# Patient Record
Sex: FEMALE | Race: WHITE | NOT HISPANIC OR LATINO
[De-identification: names, ages, dates, MRNs, and addresses within clinical notes are randomized per-mention and may not be internally consistent; named-entity substitution may affect disease eponyms.]

---

## 2017-01-10 ENCOUNTER — APPOINTMENT (OUTPATIENT)
Dept: DERMATOLOGY | Facility: CLINIC | Age: 57
End: 2017-01-10

## 2017-01-10 DIAGNOSIS — Q90.9 DOWN SYNDROME, UNSPECIFIED: ICD-10-CM

## 2017-01-10 DIAGNOSIS — L71.9 ROSACEA, UNSPECIFIED: ICD-10-CM

## 2017-01-11 ENCOUNTER — OUTPATIENT (OUTPATIENT)
Dept: OUTPATIENT SERVICES | Facility: HOSPITAL | Age: 57
LOS: 1 days | Discharge: HOME | End: 2017-01-11

## 2017-01-18 ENCOUNTER — FORM ENCOUNTER (OUTPATIENT)
Age: 57
End: 2017-01-18

## 2017-02-15 ENCOUNTER — APPOINTMENT (OUTPATIENT)
Dept: OBGYN | Facility: HOSPITAL | Age: 57
End: 2017-02-15

## 2017-02-15 VITALS
WEIGHT: 158 LBS | SYSTOLIC BLOOD PRESSURE: 116 MMHG | HEIGHT: 55 IN | HEART RATE: 64 BPM | TEMPERATURE: 97.7 F | BODY MASS INDEX: 36.57 KG/M2 | DIASTOLIC BLOOD PRESSURE: 70 MMHG

## 2017-03-01 ENCOUNTER — APPOINTMENT (OUTPATIENT)
Age: 57
End: 2017-03-01

## 2017-03-01 VITALS
WEIGHT: 162 LBS | HEIGHT: 55 IN | BODY MASS INDEX: 37.49 KG/M2 | HEART RATE: 67 BPM | TEMPERATURE: 97.9 F | DIASTOLIC BLOOD PRESSURE: 70 MMHG | SYSTOLIC BLOOD PRESSURE: 126 MMHG

## 2017-03-01 DIAGNOSIS — Z86.39 PERSONAL HISTORY OF OTHER ENDOCRINE, NUTRITIONAL AND METABOLIC DISEASE: ICD-10-CM

## 2017-03-06 ENCOUNTER — APPOINTMENT (OUTPATIENT)
Dept: UROLOGY | Facility: CLINIC | Age: 57
End: 2017-03-06

## 2017-03-29 ENCOUNTER — APPOINTMENT (OUTPATIENT)
Age: 57
End: 2017-03-29

## 2017-03-29 VITALS — WEIGHT: 165 LBS | HEIGHT: 55 IN | BODY MASS INDEX: 38.18 KG/M2

## 2017-04-11 ENCOUNTER — APPOINTMENT (OUTPATIENT)
Dept: PODIATRY | Facility: HOSPITAL | Age: 57
End: 2017-04-11

## 2017-05-04 ENCOUNTER — RX RENEWAL (OUTPATIENT)
Age: 57
End: 2017-05-04

## 2017-05-04 DIAGNOSIS — F03.90 UNSPECIFIED DEMENTIA W/OUT BEHAVIORAL DISTURBANCE: ICD-10-CM

## 2017-05-22 ENCOUNTER — APPOINTMENT (OUTPATIENT)
Age: 57
End: 2017-05-22

## 2017-05-22 ENCOUNTER — OUTPATIENT (OUTPATIENT)
Dept: OUTPATIENT SERVICES | Facility: HOSPITAL | Age: 57
LOS: 1 days | Discharge: HOME | End: 2017-05-22

## 2017-05-22 VITALS
BODY MASS INDEX: 36.1 KG/M2 | WEIGHT: 156 LBS | DIASTOLIC BLOOD PRESSURE: 72 MMHG | SYSTOLIC BLOOD PRESSURE: 124 MMHG | HEIGHT: 55 IN | HEART RATE: 68 BPM | TEMPERATURE: 97.8 F

## 2017-05-22 DIAGNOSIS — R21 RASH AND OTHER NONSPECIFIC SKIN ERUPTION: ICD-10-CM

## 2017-06-01 LAB
ALBUMIN SERPL-MCNC: 3.6 G/DL
ALBUMIN/GLOB SERPL: 0.97
ALP SERPL-CCNC: 38 IU/L
ALT SERPL-CCNC: 35 IU/L
ANION GAP SERPL CALC-SCNC: 7 MMOL/L
AST SERPL-CCNC: 39 IU/L
BASOPHILS # BLD: 0.04 TH/MM3
BASOPHILS NFR BLD: 0.8 %
BILIRUB SERPL-MCNC: 0.5 MG/DL
BUN SERPL-MCNC: 22 MG/DL
BUN/CREAT SERPL: 20.8 %
CALCIUM SERPL-MCNC: 8.8 MG/DL
CHLORIDE SERPL-SCNC: 103 MMOL/L
CHOLEST SERPL-MCNC: 196 MG/DL
CO2 SERPL-SCNC: 28 MMOL/L
CREAT SERPL-MCNC: 1.06 MG/DL
EOSINOPHIL # BLD: 0.1 TH/MM3
EOSINOPHIL NFR BLD: 2 %
ERYTHROCYTE [DISTWIDTH] IN BLOOD BY AUTOMATED COUNT: 14.7 %
GFR SERPL CREATININE-BSD FRML MDRD: 54
GLUCOSE SERPL-MCNC: 77 MG/DL
GRANULOCYTES # BLD: 2.72 TH/MM3
GRANULOCYTES NFR BLD: 55.2 %
HCT VFR BLD AUTO: 44.4 %
HDLC SERPL-MCNC: 46 MG/DL
HDLC SERPL: 4.3
HGB BLD-MCNC: 14.4 G/DL
IMM GRANULOCYTES # BLD: 0 TH/MM3
IMM GRANULOCYTES NFR BLD: 0 %
LDLC SERPL DIRECT ASSAY-MCNC: 124 MG/DL
LYMPHOCYTES # BLD: 1.5 TH/MM3
LYMPHOCYTES NFR BLD: 30.4 %
MCH RBC QN AUTO: 32.2 PG
MCHC RBC AUTO-ENTMCNC: 32.4 G/DL
MCV RBC AUTO: 99.3 FL
MONOCYTES # BLD: 0.57 TH/MM3
MONOCYTES NFR BLD: 11.6 %
PLATELET # BLD: 247 TH/MM3
PMV BLD AUTO: 11.5 FL
POTASSIUM SERPL-SCNC: 4.1 MMOL/L
PROT SERPL-MCNC: 7.3 G/DL
RBC # BLD AUTO: 4.47 ML/MM3
SODIUM SERPL-SCNC: 138 MMOL/L
TRIGL SERPL-MCNC: 54 MG/DL
VLDLC SERPL-MCNC: 10 MG/DL
WBC # BLD: 4.93 TH/MM3

## 2017-06-02 LAB — THYROID STIM HORM-HS 3RD GEN (SOUTH): 2 UIU/ML

## 2017-06-05 DIAGNOSIS — E55.9 VITAMIN D DEFICIENCY, UNSPECIFIED: ICD-10-CM

## 2017-06-05 LAB
25(OH)D3 SERPL-MCNC: 43 NG/ML
VITAMIN D2 SERPL-MCNC: <4 NG/ML
VITAMIN D3 SERPL-MCNC: 43 NG/ML

## 2017-06-27 ENCOUNTER — APPOINTMENT (OUTPATIENT)
Dept: PODIATRY | Facility: HOSPITAL | Age: 57
End: 2017-06-27

## 2017-06-27 ENCOUNTER — OUTPATIENT (OUTPATIENT)
Dept: OUTPATIENT SERVICES | Facility: HOSPITAL | Age: 57
LOS: 1 days | Discharge: HOME | End: 2017-06-27

## 2017-06-27 DIAGNOSIS — J69.0 PNEUMONITIS DUE TO INHALATION OF FOOD AND VOMIT: ICD-10-CM

## 2017-06-27 DIAGNOSIS — L85.1 ACQUIRED KERATOSIS [KERATODERMA] PALMARIS ET PLANTARIS: ICD-10-CM

## 2017-06-28 DIAGNOSIS — N39.41 URGE INCONTINENCE: ICD-10-CM

## 2017-06-28 DIAGNOSIS — F79 UNSPECIFIED INTELLECTUAL DISABILITIES: ICD-10-CM

## 2017-06-28 DIAGNOSIS — E66.9 OBESITY, UNSPECIFIED: ICD-10-CM

## 2017-06-28 DIAGNOSIS — R21 RASH AND OTHER NONSPECIFIC SKIN ERUPTION: ICD-10-CM

## 2017-06-28 DIAGNOSIS — E55.9 VITAMIN D DEFICIENCY, UNSPECIFIED: ICD-10-CM

## 2017-06-28 DIAGNOSIS — L85.1 ACQUIRED KERATOSIS [KERATODERMA] PALMARIS ET PLANTARIS: ICD-10-CM

## 2017-06-28 DIAGNOSIS — F03.90 UNSPECIFIED DEMENTIA, UNSPECIFIED SEVERITY, WITHOUT BEHAVIORAL DISTURBANCE, PSYCHOTIC DISTURBANCE, MOOD DISTURBANCE, AND ANXIETY: ICD-10-CM

## 2017-06-28 DIAGNOSIS — B35.1 TINEA UNGUIUM: ICD-10-CM

## 2017-06-28 DIAGNOSIS — L85.3 XEROSIS CUTIS: ICD-10-CM

## 2017-06-28 DIAGNOSIS — E03.9 HYPOTHYROIDISM, UNSPECIFIED: ICD-10-CM

## 2017-06-28 DIAGNOSIS — M85.80 OTHER SPECIFIED DISORDERS OF BONE DENSITY AND STRUCTURE, UNSPECIFIED SITE: ICD-10-CM

## 2017-07-03 ENCOUNTER — APPOINTMENT (OUTPATIENT)
Dept: UROLOGY | Facility: CLINIC | Age: 57
End: 2017-07-03

## 2017-07-03 VITALS
SYSTOLIC BLOOD PRESSURE: 113 MMHG | HEART RATE: 60 BPM | WEIGHT: 156 LBS | HEIGHT: 55 IN | BODY MASS INDEX: 36.1 KG/M2 | DIASTOLIC BLOOD PRESSURE: 65 MMHG

## 2017-07-19 ENCOUNTER — APPOINTMENT (OUTPATIENT)
Age: 57
End: 2017-07-19

## 2017-07-19 VITALS — WEIGHT: 153 LBS | BODY MASS INDEX: 35.41 KG/M2 | HEIGHT: 55 IN

## 2017-08-02 ENCOUNTER — RX RENEWAL (OUTPATIENT)
Age: 57
End: 2017-08-02

## 2017-08-14 ENCOUNTER — APPOINTMENT (OUTPATIENT)
Age: 57
End: 2017-08-14

## 2017-08-14 ENCOUNTER — OUTPATIENT (OUTPATIENT)
Dept: OUTPATIENT SERVICES | Facility: HOSPITAL | Age: 57
LOS: 1 days | Discharge: HOME | End: 2017-08-14

## 2017-08-14 VITALS
SYSTOLIC BLOOD PRESSURE: 108 MMHG | HEIGHT: 55 IN | BODY MASS INDEX: 34.48 KG/M2 | DIASTOLIC BLOOD PRESSURE: 74 MMHG | WEIGHT: 149 LBS | HEART RATE: 68 BPM | TEMPERATURE: 97.6 F

## 2017-08-14 DIAGNOSIS — M85.80 OTHER SPECIFIED DISORDERS OF BONE DENSITY AND STRUCTURE, UNSPECIFIED SITE: ICD-10-CM

## 2017-08-14 DIAGNOSIS — F03.90 UNSPECIFIED DEMENTIA, UNSPECIFIED SEVERITY, WITHOUT BEHAVIORAL DISTURBANCE, PSYCHOTIC DISTURBANCE, MOOD DISTURBANCE, AND ANXIETY: ICD-10-CM

## 2017-08-14 DIAGNOSIS — N39.41 URGE INCONTINENCE: ICD-10-CM

## 2017-08-14 DIAGNOSIS — E03.9 HYPOTHYROIDISM, UNSPECIFIED: ICD-10-CM

## 2017-08-14 DIAGNOSIS — E66.9 OBESITY, UNSPECIFIED: ICD-10-CM

## 2017-08-14 DIAGNOSIS — R26.81 UNSTEADINESS ON FEET: ICD-10-CM

## 2017-08-14 DIAGNOSIS — N32.81 OVERACTIVE BLADDER: ICD-10-CM

## 2017-08-14 DIAGNOSIS — J69.0 PNEUMONITIS DUE TO INHALATION OF FOOD AND VOMIT: ICD-10-CM

## 2017-09-05 ENCOUNTER — EMERGENCY (EMERGENCY)
Facility: HOSPITAL | Age: 57
LOS: 0 days | Discharge: HOME | End: 2017-09-05
Admitting: INTERNAL MEDICINE

## 2017-09-05 ENCOUNTER — APPOINTMENT (OUTPATIENT)
Dept: PODIATRY | Facility: HOSPITAL | Age: 57
End: 2017-09-05

## 2017-09-05 ENCOUNTER — OUTPATIENT (OUTPATIENT)
Dept: OUTPATIENT SERVICES | Facility: HOSPITAL | Age: 57
LOS: 1 days | Discharge: HOME | End: 2017-09-05

## 2017-09-05 DIAGNOSIS — Z79.899 OTHER LONG TERM (CURRENT) DRUG THERAPY: ICD-10-CM

## 2017-09-05 DIAGNOSIS — J69.0 PNEUMONITIS DUE TO INHALATION OF FOOD AND VOMIT: ICD-10-CM

## 2017-09-05 DIAGNOSIS — I10 ESSENTIAL (PRIMARY) HYPERTENSION: ICD-10-CM

## 2017-09-05 DIAGNOSIS — E03.9 HYPOTHYROIDISM, UNSPECIFIED: ICD-10-CM

## 2017-09-05 DIAGNOSIS — F79 UNSPECIFIED INTELLECTUAL DISABILITIES: ICD-10-CM

## 2017-09-05 DIAGNOSIS — M13.831 OTHER SPECIFIED ARTHRITIS, RIGHT WRIST: ICD-10-CM

## 2017-09-05 DIAGNOSIS — M25.431 EFFUSION, RIGHT WRIST: ICD-10-CM

## 2017-09-05 DIAGNOSIS — B35.1 TINEA UNGUIUM: ICD-10-CM

## 2017-09-13 ENCOUNTER — APPOINTMENT (OUTPATIENT)
Age: 57
End: 2017-09-13

## 2017-09-13 ENCOUNTER — OUTPATIENT (OUTPATIENT)
Dept: OUTPATIENT SERVICES | Facility: HOSPITAL | Age: 57
LOS: 1 days | Discharge: HOME | End: 2017-09-13

## 2017-09-13 VITALS — HEIGHT: 55 IN | WEIGHT: 148 LBS | HEART RATE: 60 BPM | TEMPERATURE: 97.4 F | BODY MASS INDEX: 34.25 KG/M2

## 2017-09-13 DIAGNOSIS — E03.9 HYPOTHYROIDISM, UNSPECIFIED: ICD-10-CM

## 2017-09-13 DIAGNOSIS — E66.9 OBESITY, UNSPECIFIED: ICD-10-CM

## 2017-09-13 DIAGNOSIS — Z23 ENCOUNTER FOR IMMUNIZATION: ICD-10-CM

## 2017-09-13 DIAGNOSIS — M25.431 EFFUSION, RIGHT WRIST: ICD-10-CM

## 2017-09-13 DIAGNOSIS — J69.0 PNEUMONITIS DUE TO INHALATION OF FOOD AND VOMIT: ICD-10-CM

## 2017-09-13 DIAGNOSIS — R26.81 UNSTEADINESS ON FEET: ICD-10-CM

## 2017-09-13 DIAGNOSIS — B35.6 TINEA CRURIS: ICD-10-CM

## 2017-09-13 DIAGNOSIS — F03.90 UNSPECIFIED DEMENTIA, UNSPECIFIED SEVERITY, WITHOUT BEHAVIORAL DISTURBANCE, PSYCHOTIC DISTURBANCE, MOOD DISTURBANCE, AND ANXIETY: ICD-10-CM

## 2017-09-13 DIAGNOSIS — N39.41 URGE INCONTINENCE: ICD-10-CM

## 2017-11-09 ENCOUNTER — APPOINTMENT (OUTPATIENT)
Dept: PODIATRY | Facility: HOSPITAL | Age: 57
End: 2017-11-09

## 2017-11-09 ENCOUNTER — RX RENEWAL (OUTPATIENT)
Age: 57
End: 2017-11-09

## 2017-11-13 ENCOUNTER — APPOINTMENT (OUTPATIENT)
Age: 57
End: 2017-11-13

## 2017-11-15 ENCOUNTER — APPOINTMENT (OUTPATIENT)
Age: 57
End: 2017-11-15

## 2017-11-22 ENCOUNTER — APPOINTMENT (OUTPATIENT)
Age: 57
End: 2017-11-22

## 2017-11-22 VITALS — WEIGHT: 147.5 LBS | BODY MASS INDEX: 36.92 KG/M2

## 2017-11-28 ENCOUNTER — OUTPATIENT (OUTPATIENT)
Dept: OUTPATIENT SERVICES | Facility: HOSPITAL | Age: 57
LOS: 1 days | Discharge: HOME | End: 2017-11-28

## 2017-11-28 ENCOUNTER — RX RENEWAL (OUTPATIENT)
Age: 57
End: 2017-11-28

## 2017-11-28 ENCOUNTER — APPOINTMENT (OUTPATIENT)
Dept: PODIATRY | Facility: HOSPITAL | Age: 57
End: 2017-11-28

## 2017-11-28 DIAGNOSIS — F79 UNSPECIFIED INTELLECTUAL DISABILITIES: ICD-10-CM

## 2017-11-28 DIAGNOSIS — L85.3 XEROSIS CUTIS: ICD-10-CM

## 2017-11-28 DIAGNOSIS — B35.1 TINEA UNGUIUM: ICD-10-CM

## 2017-11-28 DIAGNOSIS — J69.0 PNEUMONITIS DUE TO INHALATION OF FOOD AND VOMIT: ICD-10-CM

## 2017-12-11 ENCOUNTER — APPOINTMENT (OUTPATIENT)
Age: 57
End: 2017-12-11

## 2017-12-11 ENCOUNTER — OUTPATIENT (OUTPATIENT)
Dept: OUTPATIENT SERVICES | Facility: HOSPITAL | Age: 57
LOS: 1 days | Discharge: HOME | End: 2017-12-11

## 2017-12-11 ENCOUNTER — RESULT REVIEW (OUTPATIENT)
Age: 57
End: 2017-12-11

## 2017-12-11 VITALS
BODY MASS INDEX: 33.79 KG/M2 | WEIGHT: 146 LBS | DIASTOLIC BLOOD PRESSURE: 72 MMHG | SYSTOLIC BLOOD PRESSURE: 98 MMHG | TEMPERATURE: 96.3 F | HEART RATE: 64 BPM | HEIGHT: 55 IN

## 2017-12-11 DIAGNOSIS — M85.80 OTHER SPECIFIED DISORDERS OF BONE DENSITY AND STRUCTURE, UNSPECIFIED SITE: ICD-10-CM

## 2017-12-11 DIAGNOSIS — N32.81 OVERACTIVE BLADDER: ICD-10-CM

## 2017-12-11 DIAGNOSIS — E03.9 HYPOTHYROIDISM, UNSPECIFIED: ICD-10-CM

## 2017-12-11 DIAGNOSIS — J69.0 PNEUMONITIS DUE TO INHALATION OF FOOD AND VOMIT: ICD-10-CM

## 2017-12-11 DIAGNOSIS — E66.9 OBESITY, UNSPECIFIED: ICD-10-CM

## 2017-12-11 DIAGNOSIS — Z00.00 ENCOUNTER FOR GENERAL ADULT MEDICAL EXAMINATION W/OUT ABNORMAL FINDINGS: ICD-10-CM

## 2017-12-11 DIAGNOSIS — F03.90 UNSPECIFIED DEMENTIA W/OUT BEHAVIORAL DISTURBANCE: ICD-10-CM

## 2017-12-11 DIAGNOSIS — R13.11 DYSPHAGIA, ORAL PHASE: ICD-10-CM

## 2017-12-11 DIAGNOSIS — N39.41 URGE INCONTINENCE: ICD-10-CM

## 2017-12-11 RX ORDER — CHOLECALCIFEROL (VITAMIN D3) 50 MCG
50 MCG TABLET ORAL
Qty: 30 | Refills: 2 | Status: ACTIVE | COMMUNITY
Start: 2017-08-02 | End: 1900-01-01

## 2017-12-11 RX ORDER — B-COMPLEX WITH VITAMIN C
500-200 TABLET ORAL
Qty: 60 | Refills: 2 | Status: ACTIVE | COMMUNITY
Start: 2017-08-02 | End: 1900-01-01

## 2017-12-11 RX ORDER — METRONIDAZOLE 7.5 MG/G
0.75 CREAM TOPICAL
Refills: 0 | Status: ACTIVE | COMMUNITY

## 2017-12-11 RX ORDER — CLOTRIMAZOLE AND BETAMETHASONE DIPROPIONATE 10; .5 MG/G; MG/G
1-0.05 CREAM TOPICAL
Qty: 1 | Refills: 0 | Status: DISCONTINUED | COMMUNITY
Start: 2017-09-13 | End: 2017-12-11

## 2017-12-12 ENCOUNTER — RESULT REVIEW (OUTPATIENT)
Age: 57
End: 2017-12-12

## 2017-12-12 DIAGNOSIS — E66.9 OBESITY, UNSPECIFIED: ICD-10-CM

## 2017-12-12 DIAGNOSIS — F03.90 UNSPECIFIED DEMENTIA WITHOUT BEHAVIORAL DISTURBANCE: ICD-10-CM

## 2017-12-12 DIAGNOSIS — R13.11 DYSPHAGIA, ORAL PHASE: ICD-10-CM

## 2017-12-12 DIAGNOSIS — M85.80 OTHER SPECIFIED DISORDERS OF BONE DENSITY AND STRUCTURE, UNSPECIFIED SITE: ICD-10-CM

## 2017-12-12 DIAGNOSIS — N39.41 URGE INCONTINENCE: ICD-10-CM

## 2017-12-12 DIAGNOSIS — E03.9 HYPOTHYROIDISM, UNSPECIFIED: ICD-10-CM

## 2017-12-12 DIAGNOSIS — N32.81 OVERACTIVE BLADDER: ICD-10-CM

## 2017-12-12 LAB
ALBUMIN SERPL-MCNC: 3.4 G/DL
ALBUMIN/GLOB SERPL: 1
ALP SERPL-CCNC: 42 IU/L
ALT SERPL-CCNC: 16 IU/L
ANION GAP SERPL CALC-SCNC: 9 MMOL/L
AST SERPL-CCNC: 28 IU/L
BASOPHILS # BLD: 0.04 TH/MM3
BASOPHILS NFR BLD: 0.8 %
BILIRUB SERPL-MCNC: 0.6 MG/DL
BUN SERPL-MCNC: 21 MG/DL
BUN/CREAT SERPL: 21 %
CALCIUM SERPL-MCNC: 8.2 MG/DL
CHLORIDE SERPL-SCNC: 107 MMOL/L
CHOLEST SERPL-MCNC: 176 MG/DL
CO2 SERPL-SCNC: 23 MMOL/L
CREAT SERPL-MCNC: 1 MG/DL
EOSINOPHIL # BLD: 0.11 TH/MM3
EOSINOPHIL NFR BLD: 2.2 %
ERYTHROCYTE [DISTWIDTH] IN BLOOD BY AUTOMATED COUNT: 14.1 %
GFR SERPL CREATININE-BSD FRML MDRD: 57
GLUCOSE SERPL-MCNC: 71 MG/DL
GRANULOCYTES # BLD: 3.16 TH/MM3
GRANULOCYTES NFR BLD: 62.5 %
HCT VFR BLD AUTO: 42.6 %
HDLC SERPL-MCNC: 43 MG/DL
HDLC SERPL: 4.1
HGB BLD-MCNC: 13.9 G/DL
IMM GRANULOCYTES # BLD: 0.01 TH/MM3
IMM GRANULOCYTES NFR BLD: 0.2 %
LDLC SERPL DIRECT ASSAY-MCNC: 124 MG/DL
LYMPHOCYTES # BLD: 1.16 TH/MM3
LYMPHOCYTES NFR BLD: 23 %
MCH RBC QN AUTO: 33.1 PG
MCHC RBC AUTO-ENTMCNC: 32.6 G/DL
MCV RBC AUTO: 101.4 FL
MONOCYTES # BLD: 0.57 TH/MM3
MONOCYTES NFR BLD: 11.3 %
PLATELET # BLD: 243 TH/MM3
PMV BLD AUTO: 11.1 FL
POTASSIUM SERPL-SCNC: 4.1 MMOL/L
PROT SERPL-MCNC: 6.8 G/DL
RBC # BLD AUTO: 4.2 ML/MM3
SODIUM SERPL-SCNC: 139 MMOL/L
TRIGL SERPL-MCNC: 72 MG/DL
VLDLC SERPL-MCNC: 14 MG/DL
WBC # BLD: 5.05 TH/MM3

## 2017-12-13 LAB
T4 FREE SERPL-MCNC: 1.4 NG/DL
THYROID STIM HORM-HS 3RD GEN (SOUTH): 2.41 UIU/ML

## 2017-12-26 ENCOUNTER — INPATIENT (INPATIENT)
Facility: HOSPITAL | Age: 57
LOS: 23 days | Discharge: HOME | End: 2018-01-19
Attending: INTERNAL MEDICINE

## 2018-01-25 ENCOUNTER — APPOINTMENT (OUTPATIENT)
Age: 58
End: 2018-01-25

## 2018-01-25 ENCOUNTER — OUTPATIENT (OUTPATIENT)
Dept: OUTPATIENT SERVICES | Facility: HOSPITAL | Age: 58
LOS: 1 days | Discharge: HOME | End: 2018-01-25

## 2018-01-25 VITALS
DIASTOLIC BLOOD PRESSURE: 80 MMHG | BODY MASS INDEX: 33.04 KG/M2 | TEMPERATURE: 98.1 F | WEIGHT: 132 LBS | HEART RATE: 72 BPM | SYSTOLIC BLOOD PRESSURE: 132 MMHG

## 2018-01-25 DIAGNOSIS — Q90.9 DOWN SYNDROME, UNSPECIFIED: ICD-10-CM

## 2018-01-25 DIAGNOSIS — J98.11 ATELECTASIS: ICD-10-CM

## 2018-01-25 DIAGNOSIS — E87.6 HYPOKALEMIA: ICD-10-CM

## 2018-01-25 DIAGNOSIS — R65.21 SEVERE SEPSIS WITH SEPTIC SHOCK: ICD-10-CM

## 2018-01-25 DIAGNOSIS — J96.01 ACUTE RESPIRATORY FAILURE WITH HYPOXIA: ICD-10-CM

## 2018-01-25 DIAGNOSIS — J18.9 PNEUMONIA, UNSPECIFIED ORGANISM: ICD-10-CM

## 2018-01-25 DIAGNOSIS — D69.6 THROMBOCYTOPENIA, UNSPECIFIED: ICD-10-CM

## 2018-01-25 DIAGNOSIS — F03.90 UNSPECIFIED DEMENTIA WITHOUT BEHAVIORAL DISTURBANCE: ICD-10-CM

## 2018-01-25 DIAGNOSIS — E83.42 HYPOMAGNESEMIA: ICD-10-CM

## 2018-01-25 DIAGNOSIS — F79 UNSPECIFIED INTELLECTUAL DISABILITIES: ICD-10-CM

## 2018-01-25 DIAGNOSIS — F80.89 OTHER DEVELOPMENTAL DISORDERS OF SPEECH AND LANGUAGE: ICD-10-CM

## 2018-01-25 DIAGNOSIS — E87.1 HYPO-OSMOLALITY AND HYPONATREMIA: ICD-10-CM

## 2018-01-25 DIAGNOSIS — T38.895A ADVERSE EFFECT OF OTHER HORMONES AND SYNTHETIC SUBSTITUTES, INITIAL ENCOUNTER: ICD-10-CM

## 2018-01-25 DIAGNOSIS — K64.9 UNSPECIFIED HEMORRHOIDS: ICD-10-CM

## 2018-01-25 DIAGNOSIS — J69.0 PNEUMONITIS DUE TO INHALATION OF FOOD AND VOMIT: ICD-10-CM

## 2018-01-25 DIAGNOSIS — A41.9 SEPSIS, UNSPECIFIED ORGANISM: ICD-10-CM

## 2018-01-25 DIAGNOSIS — K56.7 ILEUS, UNSPECIFIED: ICD-10-CM

## 2018-01-25 DIAGNOSIS — E03.9 HYPOTHYROIDISM, UNSPECIFIED: ICD-10-CM

## 2018-01-25 DIAGNOSIS — T40.4X5A ADVERSE EFFECT OF OTHER SYNTHETIC NARCOTICS, INITIAL ENCOUNTER: ICD-10-CM

## 2018-01-25 DIAGNOSIS — D72.828 OTHER ELEVATED WHITE BLOOD CELL COUNT: ICD-10-CM

## 2018-01-25 DIAGNOSIS — I95.9 HYPOTENSION, UNSPECIFIED: ICD-10-CM

## 2018-01-25 DIAGNOSIS — R35.8 OTHER POLYURIA: ICD-10-CM

## 2018-01-25 DIAGNOSIS — E27.40 UNSPECIFIED ADRENOCORTICAL INSUFFICIENCY: ICD-10-CM

## 2018-01-25 DIAGNOSIS — R13.10 DYSPHAGIA, UNSPECIFIED: ICD-10-CM

## 2018-01-25 DIAGNOSIS — D64.9 ANEMIA, UNSPECIFIED: ICD-10-CM

## 2018-01-25 DIAGNOSIS — I95.89 OTHER HYPOTENSION: ICD-10-CM

## 2018-01-25 DIAGNOSIS — I10 ESSENTIAL (PRIMARY) HYPERTENSION: ICD-10-CM

## 2018-01-25 RX ORDER — PREDNISONE 20 MG/1
20 TABLET ORAL
Refills: 0 | Status: ACTIVE | COMMUNITY

## 2018-01-25 RX ORDER — OXYBUTYNIN CHLORIDE 10 MG/1
10 TABLET, EXTENDED RELEASE ORAL
Qty: 30 | Refills: 2 | Status: ACTIVE | COMMUNITY

## 2018-01-25 RX ORDER — NIACINAMIDE, ADENOSINE 1; .02 G/50ML; G/50ML
CREAM TOPICAL
Qty: 1 | Refills: 2 | Status: ACTIVE | COMMUNITY

## 2018-01-25 RX ORDER — LANOLIN, PETROLATUM 15.5; 53.4 G/100G; G/100G
OINTMENT TOPICAL
Qty: 2 | Refills: 2 | Status: ACTIVE | COMMUNITY
Start: 2017-09-13 | End: 1900-01-01

## 2018-01-25 RX ORDER — MEMANTINE HYDROCHLORIDE 14 MG/1
14 CAPSULE, EXTENDED RELEASE ORAL
Qty: 30 | Refills: 2 | Status: ACTIVE | COMMUNITY
Start: 2017-05-04

## 2018-01-25 RX ORDER — BROMPHENIRAMIN/PSEUDOEPHEDRINE 1-15MG/5ML
LIQUID (ML) ORAL
Qty: 8 | Refills: 2 | Status: DISCONTINUED | COMMUNITY
Start: 2017-08-14 | End: 2018-01-25

## 2018-01-25 RX ORDER — DIAPER,BRIEF,ADULT, DISPOSABLE
EACH MISCELLANEOUS
Qty: 3 | Refills: 2 | Status: ACTIVE | COMMUNITY
Start: 2018-01-25 | End: 1900-01-01

## 2018-01-26 ENCOUNTER — EMERGENCY (EMERGENCY)
Facility: HOSPITAL | Age: 58
LOS: 0 days | Discharge: HOME | End: 2018-01-26
Admitting: INTERNAL MEDICINE

## 2018-01-26 ENCOUNTER — INPATIENT (INPATIENT)
Facility: HOSPITAL | Age: 58
LOS: 27 days | Discharge: NURSING, REHAB OR LONG-TERM CARE | End: 2018-02-23
Attending: INTERNAL MEDICINE

## 2018-01-26 DIAGNOSIS — T38.0X5A ADVERSE EFFECT OF GLUCOCORTICOIDS AND SYNTHETIC ANALOGUES, INITIAL ENCOUNTER: ICD-10-CM

## 2018-01-26 DIAGNOSIS — E03.9 HYPOTHYROIDISM, UNSPECIFIED: ICD-10-CM

## 2018-01-26 DIAGNOSIS — R68.89 OTHER GENERAL SYMPTOMS AND SIGNS: ICD-10-CM

## 2018-01-26 DIAGNOSIS — J69.0 PNEUMONITIS DUE TO INHALATION OF FOOD AND VOMIT: ICD-10-CM

## 2018-01-26 DIAGNOSIS — I10 ESSENTIAL (PRIMARY) HYPERTENSION: ICD-10-CM

## 2018-01-26 DIAGNOSIS — Z79.899 OTHER LONG TERM (CURRENT) DRUG THERAPY: ICD-10-CM

## 2018-01-27 RX ORDER — LEVOTHYROXINE SODIUM 125 MCG
1 TABLET ORAL
Qty: 0 | Refills: 0 | COMMUNITY
Start: 2018-01-27

## 2018-01-30 DIAGNOSIS — J69.0 PNEUMONITIS DUE TO INHALATION OF FOOD AND VOMIT: ICD-10-CM

## 2018-02-03 VITALS — WEIGHT: 141.32 LBS | HEIGHT: 65 IN

## 2018-02-03 RX ORDER — NYSTATIN CREAM 100000 [USP'U]/G
100000 CREAM TOPICAL
Qty: 0 | Refills: 0 | COMMUNITY

## 2018-02-03 RX ORDER — NYSTATIN CREAM 100000 [USP'U]/G
100 CREAM TOPICAL
Qty: 0 | Refills: 0 | COMMUNITY

## 2018-02-03 RX ORDER — HEPARIN SODIUM 5000 [USP'U]/ML
5000 INJECTION INTRAVENOUS; SUBCUTANEOUS
Qty: 0 | Refills: 0 | COMMUNITY

## 2018-02-03 RX ORDER — PANTOPRAZOLE SODIUM 20 MG/1
40 TABLET, DELAYED RELEASE ORAL
Qty: 0 | Refills: 0 | COMMUNITY

## 2018-02-03 RX ORDER — NYSTATIN/TRIAMCINOLONE ACET
1 OINTMENT (GRAM) TOPICAL EVERY 12 HOURS
Qty: 0 | Refills: 0 | Status: DISCONTINUED | OUTPATIENT
Start: 2018-02-03 | End: 2018-02-23

## 2018-02-03 RX ORDER — MEROPENEM 1 G/30ML
1000 INJECTION INTRAVENOUS EVERY 8 HOURS
Qty: 0 | Refills: 0 | Status: DISCONTINUED | OUTPATIENT
Start: 2018-02-03 | End: 2018-02-03

## 2018-02-03 RX ORDER — LEVOTHYROXINE SODIUM 125 MCG
75 TABLET ORAL DAILY
Qty: 0 | Refills: 0 | Status: DISCONTINUED | OUTPATIENT
Start: 2018-02-03 | End: 2018-02-23

## 2018-02-03 RX ORDER — DOCUSATE SODIUM 100 MG
1 CAPSULE ORAL
Qty: 0 | Refills: 0 | COMMUNITY

## 2018-02-03 RX ORDER — MEROPENEM 1 G/30ML
1 INJECTION INTRAVENOUS
Qty: 0 | Refills: 0 | COMMUNITY

## 2018-02-03 RX ORDER — NOREPINEPHRINE BITARTRATE/D5W 8 MG/250ML
0.25 PLASTIC BAG, INJECTION (ML) INTRAVENOUS
Qty: 8 | Refills: 0 | Status: DISCONTINUED | OUTPATIENT
Start: 2018-02-03 | End: 2018-02-03

## 2018-02-03 RX ORDER — DEXMEDETOMIDINE HYDROCHLORIDE IN 0.9% SODIUM CHLORIDE 4 UG/ML
0.5 INJECTION INTRAVENOUS
Qty: 200 | Refills: 0 | Status: DISCONTINUED | OUTPATIENT
Start: 2018-02-03 | End: 2018-02-09

## 2018-02-03 RX ORDER — DOCUSATE SODIUM 100 MG
100 CAPSULE ORAL THREE TIMES A DAY
Qty: 0 | Refills: 0 | Status: DISCONTINUED | OUTPATIENT
Start: 2018-02-03 | End: 2018-02-13

## 2018-02-03 RX ORDER — NOREPINEPHRINE BITARTRATE/D5W 8 MG/250ML
0.21 PLASTIC BAG, INJECTION (ML) INTRAVENOUS
Qty: 8 | Refills: 0 | Status: DISCONTINUED | OUTPATIENT
Start: 2018-02-03 | End: 2018-02-14

## 2018-02-03 RX ORDER — INFLUENZA VIRUS VACCINE 15; 15; 15; 15 UG/.5ML; UG/.5ML; UG/.5ML; UG/.5ML
0.5 SUSPENSION INTRAMUSCULAR ONCE
Qty: 0 | Refills: 0 | Status: DISCONTINUED | OUTPATIENT
Start: 2018-02-03 | End: 2018-02-14

## 2018-02-03 RX ORDER — ACETAMINOPHEN 500 MG
650 TABLET ORAL EVERY 4 HOURS
Qty: 0 | Refills: 0 | Status: DISCONTINUED | OUTPATIENT
Start: 2018-02-03 | End: 2018-02-23

## 2018-02-03 RX ORDER — ACETAMINOPHEN 500 MG
1 TABLET ORAL
Qty: 0 | Refills: 0 | COMMUNITY

## 2018-02-03 RX ORDER — CHLORHEXIDINE GLUCONATE 213 G/1000ML
15 SOLUTION TOPICAL
Qty: 0 | Refills: 0 | Status: DISCONTINUED | OUTPATIENT
Start: 2018-02-03 | End: 2018-02-23

## 2018-02-03 RX ORDER — SENNA PLUS 8.6 MG/1
1 TABLET ORAL EVERY 12 HOURS
Qty: 0 | Refills: 0 | Status: DISCONTINUED | OUTPATIENT
Start: 2018-02-03 | End: 2018-02-23

## 2018-02-03 RX ORDER — NYSTATIN CREAM 100000 [USP'U]/G
1 CREAM TOPICAL EVERY 12 HOURS
Qty: 0 | Refills: 0 | Status: DISCONTINUED | OUTPATIENT
Start: 2018-02-03 | End: 2018-02-18

## 2018-02-03 RX ORDER — PANTOPRAZOLE SODIUM 20 MG/1
40 TABLET, DELAYED RELEASE ORAL EVERY 24 HOURS
Qty: 0 | Refills: 0 | Status: DISCONTINUED | OUTPATIENT
Start: 2018-02-03 | End: 2018-02-23

## 2018-02-03 RX ORDER — MEROPENEM 1 G/30ML
1000 INJECTION INTRAVENOUS EVERY 8 HOURS
Qty: 0 | Refills: 0 | Status: COMPLETED | OUTPATIENT
Start: 2018-02-03 | End: 2018-02-05

## 2018-02-03 RX ORDER — HEPARIN SODIUM 5000 [USP'U]/ML
5000 INJECTION INTRAVENOUS; SUBCUTANEOUS EVERY 8 HOURS
Qty: 0 | Refills: 0 | Status: DISCONTINUED | OUTPATIENT
Start: 2018-02-03 | End: 2018-02-13

## 2018-02-03 RX ADMIN — Medication 1 APPLICATION(S): at 18:40

## 2018-02-03 RX ADMIN — HEPARIN SODIUM 5000 UNIT(S): 5000 INJECTION INTRAVENOUS; SUBCUTANEOUS at 22:23

## 2018-02-03 RX ADMIN — NYSTATIN CREAM 1 APPLICATION(S): 100000 CREAM TOPICAL at 18:39

## 2018-02-03 RX ADMIN — SENNA PLUS 1 TABLET(S): 8.6 TABLET ORAL at 18:40

## 2018-02-03 RX ADMIN — MEROPENEM 100 MILLIGRAM(S): 1 INJECTION INTRAVENOUS at 22:23

## 2018-02-04 DIAGNOSIS — J69.0 PNEUMONITIS DUE TO INHALATION OF FOOD AND VOMIT: ICD-10-CM

## 2018-02-04 DIAGNOSIS — A41.9 SEPSIS, UNSPECIFIED ORGANISM: ICD-10-CM

## 2018-02-04 DIAGNOSIS — J96.90 RESPIRATORY FAILURE, UNSPECIFIED, UNSPECIFIED WHETHER WITH HYPOXIA OR HYPERCAPNIA: ICD-10-CM

## 2018-02-04 DIAGNOSIS — J18.9 PNEUMONIA, UNSPECIFIED ORGANISM: ICD-10-CM

## 2018-02-04 DIAGNOSIS — E03.9 HYPOTHYROIDISM, UNSPECIFIED: ICD-10-CM

## 2018-02-04 RX ADMIN — Medication 25 MICROGRAM(S)/KG/MIN: at 13:15

## 2018-02-04 RX ADMIN — HEPARIN SODIUM 5000 UNIT(S): 5000 INJECTION INTRAVENOUS; SUBCUTANEOUS at 14:13

## 2018-02-04 RX ADMIN — Medication 75 MICROGRAM(S): at 06:53

## 2018-02-04 RX ADMIN — MEROPENEM 100 MILLIGRAM(S): 1 INJECTION INTRAVENOUS at 23:11

## 2018-02-04 RX ADMIN — NYSTATIN CREAM 1 APPLICATION(S): 100000 CREAM TOPICAL at 17:41

## 2018-02-04 RX ADMIN — HEPARIN SODIUM 5000 UNIT(S): 5000 INJECTION INTRAVENOUS; SUBCUTANEOUS at 21:47

## 2018-02-04 RX ADMIN — MEROPENEM 100 MILLIGRAM(S): 1 INJECTION INTRAVENOUS at 16:05

## 2018-02-04 RX ADMIN — PANTOPRAZOLE SODIUM 40 MILLIGRAM(S): 20 TABLET, DELAYED RELEASE ORAL at 13:55

## 2018-02-04 RX ADMIN — CHLORHEXIDINE GLUCONATE 15 MILLILITER(S): 213 SOLUTION TOPICAL at 17:40

## 2018-02-04 RX ADMIN — DEXMEDETOMIDINE HYDROCHLORIDE IN 0.9% SODIUM CHLORIDE 8 MICROGRAM(S)/KG/HR: 4 INJECTION INTRAVENOUS at 13:13

## 2018-02-04 RX ADMIN — Medication 1 APPLICATION(S): at 06:56

## 2018-02-04 RX ADMIN — HEPARIN SODIUM 5000 UNIT(S): 5000 INJECTION INTRAVENOUS; SUBCUTANEOUS at 21:48

## 2018-02-04 RX ADMIN — DEXMEDETOMIDINE HYDROCHLORIDE IN 0.9% SODIUM CHLORIDE 8 MICROGRAM(S)/KG/HR: 4 INJECTION INTRAVENOUS at 18:00

## 2018-02-04 RX ADMIN — Medication 1 APPLICATION(S): at 17:41

## 2018-02-04 RX ADMIN — MEROPENEM 100 MILLIGRAM(S): 1 INJECTION INTRAVENOUS at 06:55

## 2018-02-04 RX ADMIN — NYSTATIN CREAM 1 APPLICATION(S): 100000 CREAM TOPICAL at 06:55

## 2018-02-04 RX ADMIN — CHLORHEXIDINE GLUCONATE 15 MILLILITER(S): 213 SOLUTION TOPICAL at 06:52

## 2018-02-04 NOTE — PROGRESS NOTE ADULT - SUBJECTIVE AND OBJECTIVE BOX
Patient is a 57y old  Female awake on vent NAD    T(F): 97.9 (02-04-18 @ 11:00), Max: 98.6 (02-04-18 @ 02:00)  HR: 57 (02-04-18 @ 11:00)  BP: 87/67 (02-04-18 @ 11:00)  RR: 21 (02-04-18 @ 11:00)  SpO2: 99% (02-04-18 @ 11:00) (98% - 99%)    PHYSICAL EXAM:  GENERAL: NAD, well-groomed, well-developed  HEAD:  Atraumatic, Normocephalic  EYES: EOMI, PERRLA, conjunctiva and sclera clear  ENMT: No tonsillar erythema, exudates, or enlargement; Moist mucous membranes, Good dentition, No lesions  NECK: Supple, No JVD, Normal thyroid  NERVOUS SYSTEM:  Alert & Oriented X3, Good concentration; Motor Strength 5/5 B/L upper and lower extremities; DTRs 2+ intact and symmetric  CHEST/LUNG: b/l rhonchi  HEART: Regular rate and rhythm; No murmurs, rubs, or gallops  ABDOMEN: Soft, Nontender, Nondistended; Bowel sounds present  EXTREMITIES:  2+ Peripheral Pulses, No clubbing, cyanosis, mild b/l ankle edema  LYMPH: No lymphadenopathy noted  SKIN: No rashes or lesions    labs  02-04    139  |  101  |  12  ----------------------------<  122<H>  4.1   |  31  |  0.6<L>    Ca    8.0<L>      04 Feb 2018 07:01  Mg     2.2     02-04                            10.4   10.66 )-----------( 185      ( 04 Feb 2018 07:01 )             31.9           PT/INR - ( 04 Feb 2018 07:01 )   PT: 11.90 sec;   INR: 1.10 ratio         PTT - ( 04 Feb 2018 07:01 )  PTT:41.0   RADIOLOGY  < from: Xray Chest 1 View- PORTABLE-Routine (02.04.18 @ 05:38) >  Impression:      Improved diffuse bilateral airspace opacities. Endotracheal tube in   better position.      < end of copied text >  MEDICATIONS    acetaminophen  Suppository 650 milliGRAM(s) Rectal every 4 hours PRN  chlorhexidine 0.12% Liquid 15 milliLiter(s) Swish and Spit two times a day  dexmedetomidine Infusion 0.499 MICROgram(s)/kG/Hr IV Continuous <Continuous>  docusate sodium 100 milliGRAM(s) Oral three times a day  heparin  Injectable (Preservative-Free) 5000 Unit(s) SubCutaneous every 8 hours  influenza   Vaccine 0.5 milliLiter(s) IntraMuscular once  levothyroxine 75 MICROGram(s) Oral daily  meropenem  IVPB 1000 milliGRAM(s) IV Intermittent every 8 hours  norepinephrine Infusion 0.208 MICROgram(s)/kG/Min IV Continuous <Continuous>  nystatin Powder 1 Application(s) Topical every 12 hours  nystatin/triamcinolone Ointment 1 Application(s) Topical every 12 hours  pantoprazole  Injectable 40 milliGRAM(s) IV Push every 24 hours  senna 1 Tablet(s) Oral every 12 hours Patient is a 57y old  Female awake on vent NAD    T(F): 97.9 (02-04-18 @ 11:00), Max: 98.6 (02-04-18 @ 02:00)  HR: 57 (02-04-18 @ 11:00)  BP: 87/67 (02-04-18 @ 11:00)  RR: 21 (02-04-18 @ 11:00)  SpO2: 99% (02-04-18 @ 11:00) (98% - 99%)    PHYSICAL EXAM:  GENERAL: NAD, well-groomed, well-developed  HEAD:  Atraumatic, Normocephalic  EYES: EOMI, PERRLA, conjunctiva and sclera clear  ENMT:; Moist mucous membranes,et/og tube in place  NECK: Supple, No JVD,   NERVOUS SYSTEM: no focal deficit  CHEST/LUNG: b/l rhonchi  HEART: Regular rate and rhythm; No murmurs, rubs, or gallops  ABDOMEN: Soft, Nontender, Nondistended; Bowel sounds present  EXTREMITIES:  2+ Peripheral Pulses, No clubbing, cyanosis, mild b/l ankle edema  LYMPH: No lymphadenopathy noted  SKIN: No rashes or lesions    labs  02-04    139  |  101  |  12  ----------------------------<  122<H>  4.1   |  31  |  0.6<L>    Ca    8.0<L>      04 Feb 2018 07:01  Mg     2.2     02-04                            10.4   10.66 )-----------( 185      ( 04 Feb 2018 07:01 )             31.9           PT/INR - ( 04 Feb 2018 07:01 )   PT: 11.90 sec;   INR: 1.10 ratio         PTT - ( 04 Feb 2018 07:01 )  PTT:41.0   RADIOLOGY  < from: Xray Chest 1 View- PORTABLE-Routine (02.04.18 @ 05:38) >  Impression:      Improved diffuse bilateral airspace opacities. Endotracheal tube in   better position.      < end of copied text >  MEDICATIONS    acetaminophen  Suppository 650 milliGRAM(s) Rectal every 4 hours PRN  chlorhexidine 0.12% Liquid 15 milliLiter(s) Swish and Spit two times a day  dexmedetomidine Infusion 0.499 MICROgram(s)/kG/Hr IV Continuous <Continuous>  docusate sodium 100 milliGRAM(s) Oral three times a day  heparin  Injectable (Preservative-Free) 5000 Unit(s) SubCutaneous every 8 hours  influenza   Vaccine 0.5 milliLiter(s) IntraMuscular once  levothyroxine 75 MICROGram(s) Oral daily  meropenem  IVPB 1000 milliGRAM(s) IV Intermittent every 8 hours  norepinephrine Infusion 0.208 MICROgram(s)/kG/Min IV Continuous <Continuous>  nystatin Powder 1 Application(s) Topical every 12 hours  nystatin/triamcinolone Ointment 1 Application(s) Topical every 12 hours  pantoprazole  Injectable 40 milliGRAM(s) IV Push every 24 hours  senna 1 Tablet(s) Oral every 12 hours

## 2018-02-05 DIAGNOSIS — A41.9 SEPSIS, UNSPECIFIED ORGANISM: ICD-10-CM

## 2018-02-05 DIAGNOSIS — E03.9 HYPOTHYROIDISM, UNSPECIFIED: ICD-10-CM

## 2018-02-05 DIAGNOSIS — Z01.20 ENCOUNTER FOR DENTAL EXAMINATION AND CLEANING WITHOUT ABNORMAL FINDINGS: ICD-10-CM

## 2018-02-05 DIAGNOSIS — J69.0 PNEUMONITIS DUE TO INHALATION OF FOOD AND VOMIT: ICD-10-CM

## 2018-02-05 DIAGNOSIS — J96.01 ACUTE RESPIRATORY FAILURE WITH HYPOXIA: ICD-10-CM

## 2018-02-05 LAB
ALBUMIN SERPL ELPH-MCNC: 1.7 G/DL — LOW (ref 3–5.5)
ALBUMIN SERPL ELPH-MCNC: 1.7 G/DL — LOW (ref 3–5.5)
ALP SERPL-CCNC: 94 U/L — SIGNIFICANT CHANGE UP (ref 30–115)
ALP SERPL-CCNC: 95 U/L — SIGNIFICANT CHANGE UP (ref 30–115)
ALT FLD-CCNC: 31 U/L — SIGNIFICANT CHANGE UP (ref 0–41)
ALT FLD-CCNC: 32 U/L — SIGNIFICANT CHANGE UP (ref 0–41)
ANION GAP SERPL CALC-SCNC: 6 MMOL/L — LOW (ref 7–14)
ANION GAP SERPL CALC-SCNC: 7 MMOL/L — SIGNIFICANT CHANGE UP (ref 7–14)
APTT BLD: 44.3 SEC — HIGH (ref 27–39.2)
AST SERPL-CCNC: 29 U/L — SIGNIFICANT CHANGE UP (ref 0–41)
AST SERPL-CCNC: 33 U/L — SIGNIFICANT CHANGE UP (ref 0–41)
BILIRUB SERPL-MCNC: 0.4 MG/DL — SIGNIFICANT CHANGE UP (ref 0.2–1.2)
BILIRUB SERPL-MCNC: 0.5 MG/DL — SIGNIFICANT CHANGE UP (ref 0.2–1.2)
BUN SERPL-MCNC: 13 MG/DL — SIGNIFICANT CHANGE UP (ref 10–20)
BUN SERPL-MCNC: 14 MG/DL — SIGNIFICANT CHANGE UP (ref 10–20)
CALCIUM SERPL-MCNC: 8 MG/DL — LOW (ref 8.5–10.1)
CALCIUM SERPL-MCNC: 8.1 MG/DL — LOW (ref 8.5–10.1)
CHLORIDE SERPL-SCNC: 104 MMOL/L — SIGNIFICANT CHANGE UP (ref 98–110)
CHLORIDE SERPL-SCNC: 104 MMOL/L — SIGNIFICANT CHANGE UP (ref 98–110)
CO2 SERPL-SCNC: 27 MMOL/L — SIGNIFICANT CHANGE UP (ref 17–32)
CO2 SERPL-SCNC: 29 MMOL/L — SIGNIFICANT CHANGE UP (ref 17–32)
CREAT SERPL-MCNC: 0.5 MG/DL — LOW (ref 0.7–1.5)
CREAT SERPL-MCNC: 0.6 MG/DL — LOW (ref 0.7–1.5)
GLUCOSE SERPL-MCNC: 116 MG/DL — HIGH (ref 70–110)
GLUCOSE SERPL-MCNC: 165 MG/DL — HIGH (ref 70–110)
HCT VFR BLD CALC: 31.8 % — LOW (ref 37–47)
HGB BLD-MCNC: 10.5 G/DL — LOW (ref 14–18)
INR BLD: 1.15 RATIO — SIGNIFICANT CHANGE UP (ref 0.65–1.3)
MAGNESIUM SERPL-MCNC: 1.9 MG/DL — SIGNIFICANT CHANGE UP (ref 1.8–2.4)
MAGNESIUM SERPL-MCNC: 2 MG/DL — SIGNIFICANT CHANGE UP (ref 1.8–2.4)
MCHC RBC-ENTMCNC: 32.3 PG — HIGH (ref 27–31)
MCHC RBC-ENTMCNC: 33 G/DL — SIGNIFICANT CHANGE UP (ref 32–37)
MCV RBC AUTO: 97.8 FL — HIGH (ref 81–91)
NRBC # BLD: 0 /100 WBCS — SIGNIFICANT CHANGE UP (ref 0–0)
PLATELET # BLD AUTO: 198 K/UL — SIGNIFICANT CHANGE UP (ref 130–400)
POTASSIUM SERPL-MCNC: 4 MMOL/L — SIGNIFICANT CHANGE UP (ref 3.5–5)
POTASSIUM SERPL-MCNC: 4 MMOL/L — SIGNIFICANT CHANGE UP (ref 3.5–5)
POTASSIUM SERPL-SCNC: 4 MMOL/L — SIGNIFICANT CHANGE UP (ref 3.5–5)
POTASSIUM SERPL-SCNC: 4 MMOL/L — SIGNIFICANT CHANGE UP (ref 3.5–5)
PROT SERPL-MCNC: 4.9 G/DL — LOW (ref 6–8)
PROT SERPL-MCNC: 4.9 G/DL — LOW (ref 6–8)
PROTHROM AB SERPL-ACNC: 12.5 SEC — SIGNIFICANT CHANGE UP (ref 9.95–12.87)
RBC # BLD: 3.25 M/UL — LOW (ref 4.2–5.4)
RBC # FLD: 16.2 % — HIGH (ref 11.5–14.5)
SODIUM SERPL-SCNC: 138 MMOL/L — SIGNIFICANT CHANGE UP (ref 135–146)
SODIUM SERPL-SCNC: 139 MMOL/L — SIGNIFICANT CHANGE UP (ref 135–146)
WBC # BLD: 9.2 K/UL — SIGNIFICANT CHANGE UP (ref 4.8–10.8)
WBC # FLD AUTO: 9.2 K/UL — SIGNIFICANT CHANGE UP (ref 4.8–10.8)

## 2018-02-05 RX ORDER — VANCOMYCIN HCL 1 G
1250 VIAL (EA) INTRAVENOUS EVERY 12 HOURS
Qty: 0 | Refills: 0 | Status: DISCONTINUED | OUTPATIENT
Start: 2018-02-05 | End: 2018-02-07

## 2018-02-05 RX ADMIN — HEPARIN SODIUM 5000 UNIT(S): 5000 INJECTION INTRAVENOUS; SUBCUTANEOUS at 05:36

## 2018-02-05 RX ADMIN — CHLORHEXIDINE GLUCONATE 15 MILLILITER(S): 213 SOLUTION TOPICAL at 05:32

## 2018-02-05 RX ADMIN — Medication 166.67 MILLIGRAM(S): at 17:42

## 2018-02-05 RX ADMIN — DEXMEDETOMIDINE HYDROCHLORIDE IN 0.9% SODIUM CHLORIDE 8 MICROGRAM(S)/KG/HR: 4 INJECTION INTRAVENOUS at 03:15

## 2018-02-05 RX ADMIN — Medication 25 MICROGRAM(S)/KG/MIN: at 13:11

## 2018-02-05 RX ADMIN — Medication 100 MILLIGRAM(S): at 13:29

## 2018-02-05 RX ADMIN — DEXMEDETOMIDINE HYDROCHLORIDE IN 0.9% SODIUM CHLORIDE 8 MICROGRAM(S)/KG/HR: 4 INJECTION INTRAVENOUS at 09:01

## 2018-02-05 RX ADMIN — NYSTATIN CREAM 1 APPLICATION(S): 100000 CREAM TOPICAL at 17:46

## 2018-02-05 RX ADMIN — Medication 25 MICROGRAM(S)/KG/MIN: at 13:12

## 2018-02-05 RX ADMIN — Medication 75 MICROGRAM(S): at 07:41

## 2018-02-05 RX ADMIN — DEXMEDETOMIDINE HYDROCHLORIDE IN 0.9% SODIUM CHLORIDE 8 MICROGRAM(S)/KG/HR: 4 INJECTION INTRAVENOUS at 20:00

## 2018-02-05 RX ADMIN — Medication 1 APPLICATION(S): at 05:37

## 2018-02-05 RX ADMIN — NYSTATIN CREAM 1 APPLICATION(S): 100000 CREAM TOPICAL at 05:34

## 2018-02-05 RX ADMIN — MEROPENEM 100 MILLIGRAM(S): 1 INJECTION INTRAVENOUS at 05:29

## 2018-02-05 RX ADMIN — MEROPENEM 100 MILLIGRAM(S): 1 INJECTION INTRAVENOUS at 13:47

## 2018-02-05 RX ADMIN — Medication 1 APPLICATION(S): at 17:46

## 2018-02-05 RX ADMIN — SENNA PLUS 1 TABLET(S): 8.6 TABLET ORAL at 17:44

## 2018-02-05 RX ADMIN — SENNA PLUS 1 TABLET(S): 8.6 TABLET ORAL at 07:41

## 2018-02-05 RX ADMIN — Medication 100 MILLIGRAM(S): at 05:39

## 2018-02-05 RX ADMIN — PANTOPRAZOLE SODIUM 40 MILLIGRAM(S): 20 TABLET, DELAYED RELEASE ORAL at 13:27

## 2018-02-05 RX ADMIN — HEPARIN SODIUM 5000 UNIT(S): 5000 INJECTION INTRAVENOUS; SUBCUTANEOUS at 22:38

## 2018-02-05 RX ADMIN — Medication 100 MILLIGRAM(S): at 22:37

## 2018-02-05 RX ADMIN — HEPARIN SODIUM 5000 UNIT(S): 5000 INJECTION INTRAVENOUS; SUBCUTANEOUS at 13:30

## 2018-02-05 RX ADMIN — DEXMEDETOMIDINE HYDROCHLORIDE IN 0.9% SODIUM CHLORIDE 8 MICROGRAM(S)/KG/HR: 4 INJECTION INTRAVENOUS at 15:52

## 2018-02-05 NOTE — CONSULT NOTE ADULT - ATTENDING COMMENTS
Patient seen and examined with surgery team on rounds and agree with management plan. will discuss with medical team regarding timing of tracheostomy.

## 2018-02-05 NOTE — CONSULT NOTE ADULT - SUBJECTIVE AND OBJECTIVE BOX
58 Y/O FEMALE ,PMH HYPOTHYROID, MVR, HEMORRHOIDS,  ADMITTED 1/27/18 FROM [facility] IN RESPIRATORY DISTRESS/ RESPIRATORY FAILURE, VENTED, ON 40%O2, 450, RR16 , PEEP 5, ALSO ON LEVOPHED FOR  EVALUATION FOR TRACHEOSTOMY

## 2018-02-05 NOTE — CONSULT NOTE ADULT - SUBJECTIVE AND OBJECTIVE BOX
Patient is a 57y old  Female who presents with a chief complaint of hypoxia at NH. Pt had difficulty breathing after eating dinner, brought to ED. Pt previously hospitalized 1month ago for aspiration PNA. Pt intubated and placed on pressors.    PAST MEDICAL & SURGICAL HISTORY:  Down's syndrome  Hypothryoidism  Hemorhoids    MEDICATIONS  (STANDING):  chlorhexidine 0.12% Liquid 15 milliLiter(s) Swish and Spit two times a day  dexmedetomidine Infusion 0.499 MICROgram(s)/kG/Hr (8 mL/Hr) IV Continuous <Continuous>  docusate sodium 100 milliGRAM(s) Oral three times a day  heparin  Injectable (Preservative-Free) 5000 Unit(s) SubCutaneous every 8 hours  influenza   Vaccine 0.5 milliLiter(s) IntraMuscular once  levothyroxine 75 MICROGram(s) Oral daily  meropenem  IVPB 1000 milliGRAM(s) IV Intermittent every 8 hours  norepinephrine Infusion 0.208 MICROgram(s)/kG/Min (25 mL/Hr) IV Continuous <Continuous>  nystatin Powder 1 Application(s) Topical every 12 hours  nystatin/triamcinolone Ointment 1 Application(s) Topical every 12 hours  pantoprazole  Injectable 40 milliGRAM(s) IV Push every 24 hours  senna 1 Tablet(s) Oral every 12 hours    MEDICATIONS  (PRN):  acetaminophen  Suppository 650 milliGRAM(s) Rectal every 4 hours PRN For Temp greater than 38 C (100.4 F)    No Known Allergies      Overnight events:    Vital Signs Last 24 Hrs  T(C): 36.1 (05 Feb 2018 07:01), Max: 37.2 (05 Feb 2018 03:00)  T(F): 97 (05 Feb 2018 07:01), Max: 99 (05 Feb 2018 03:00)  HR: 44 (05 Feb 2018 05:01) (44 - 64)  BP: 116/55 (05 Feb 2018 05:01) (68/37 - 126/61)  BP(mean): 79 (05 Feb 2018 05:01) (47 - 88)  RR: 16 (05 Feb 2018 07:01) (16 - 25)  SpO2: 98% (05 Feb 2018 05:01) (96% - 99%)  CAPILLARY BLOOD GLUCOSE        I&O's Summary    04 Feb 2018 07:01  -  05 Feb 2018 07:00  --------------------------------------------------------  IN: 1918 mL / OUT: 2335 mL / NET: -417 mL    05 Feb 2018 07:01  -  05 Feb 2018 07:47  --------------------------------------------------------  IN: 0 mL / OUT: 60 mL / NET: -60 mL          Labs:                        10.4   10.66 )-----------( 185      ( 04 Feb 2018 07:01 )             31.9             02-04    139  |  101  |  12  ----------------------------<  122<H>  4.1   |  31  |  0.6<L>    Ca    8.0<L>      04 Feb 2018 07:01  Mg     2.2     02-04              PT/INR - ( 04 Feb 2018 07:01 )   PT: 11.90 sec;   INR: 1.10 ratio         PTT - ( 04 Feb 2018 07:01 )  PTT:41.0 sec      ABG - ( 04 Feb 2018 06:00 )  pH: x     /  pCO2: 39    /  pO2: 100   / HCO3: 31    / Base Excess: 8.0   /  SaO2: 98          Imaging:    ECG:    Physical Exam:  Gen NAD, vented  Cardiac: RRR, no murmurs, rubs gallops  Lungs: CTA b/l  Abdomen: +BS, soft, nontender, nondistended  Extremities: no C/C/E  Reeder in place        Assessment and Plan:   · Assessment		  56 yo F being treated for aspiration PNA     Problem/Plan - 1:  ·  Problem: Sepsis.  Plan: -pt remains on pressors  -titrate as tolerated.      Problem/Plan - 2:  ·  Problem: Hypothyroidism, unspecified type.  Plan: -c/w synthroid.      Problem/Plan - 3:  ·  Problem: Acute respiratory failure with hypoxia.  Plan: -SBT, wean off vent as tolerated.      Problem/Plan - 4:  ·  Problem: Aspiration pneumonia, unspecified aspiration pneumonia type, unspecified laterality, unspecified part of lung.  Plan: -c/w meropenem, vancomycin. Patient is a 57y old  Female with mental retardation from [facility] who presents with a chief complaint of hypoxia at home. Pt had difficulty breathing after eating dinner, brought to ED. Pt previously hospitalized 1month ago for aspiration PNA. Pt intubated and placed on pressors.    PAST MEDICAL & SURGICAL HISTORY:  Down's syndrome  Hypothyroidism  Hemorrhoids    MEDICATIONS  (STANDING):  chlorhexidine 0.12% Liquid 15 milliLiter(s) Swish and Spit two times a day  dexmedetomidine Infusion 0.499 MICROgram(s)/kG/Hr (8 mL/Hr) IV Continuous <Continuous>  docusate sodium 100 milliGRAM(s) Oral three times a day  heparin  Injectable (Preservative-Free) 5000 Unit(s) SubCutaneous every 8 hours  influenza   Vaccine 0.5 milliLiter(s) IntraMuscular once  levothyroxine 75 MICROGram(s) Oral daily  meropenem  IVPB 1000 milliGRAM(s) IV Intermittent every 8 hours  norepinephrine Infusion 0.208 MICROgram(s)/kG/Min (25 mL/Hr) IV Continuous <Continuous>  nystatin Powder 1 Application(s) Topical every 12 hours  nystatin/triamcinolone Ointment 1 Application(s) Topical every 12 hours  pantoprazole  Injectable 40 milliGRAM(s) IV Push every 24 hours  senna 1 Tablet(s) Oral every 12 hours    MEDICATIONS  (PRN):  acetaminophen  Suppository 650 milliGRAM(s) Rectal every 4 hours PRN For Temp greater than 38 C (100.4 F)    No Known Allergies      Overnight events:    Vital Signs Last 24 Hrs  T(C): 36.1 (05 Feb 2018 07:01), Max: 37.2 (05 Feb 2018 03:00)  T(F): 97 (05 Feb 2018 07:01), Max: 99 (05 Feb 2018 03:00)  HR: 44 (05 Feb 2018 05:01) (44 - 64)  BP: 116/55 (05 Feb 2018 05:01) (68/37 - 126/61)  BP(mean): 79 (05 Feb 2018 05:01) (47 - 88)  RR: 16 (05 Feb 2018 07:01) (16 - 25)  SpO2: 98% (05 Feb 2018 05:01) (96% - 99%)  CAPILLARY BLOOD GLUCOSE        I&O's Summary    04 Feb 2018 07:01  -  05 Feb 2018 07:00  --------------------------------------------------------  IN: 1918 mL / OUT: 2335 mL / NET: -417 mL    05 Feb 2018 07:01  -  05 Feb 2018 07:47  --------------------------------------------------------  IN: 0 mL / OUT: 60 mL / NET: -60 mL          Labs:           +             10.4   10.66 )-----------( 185      ( 04 Feb 2018 07:01 )             31.9             02-04    139  |  101  |  12  ----------------------------<  122<H>  4.1   |  31  |  0.6<L>    Ca    8.0<L>      04 Feb 2018 07:01  Mg     2.2     02-04              PT/INR - ( 04 Feb 2018 07:01 )   PT: 11.90 sec;   INR: 1.10 ratio         PTT - ( 04 Feb 2018 07:01 )  PTT:41.0 sec      ABG - ( 04 Feb 2018 06:00 )  pH: x     /  pCO2: 39    /  pO2: 100   / HCO3: 31    / Base Excess: 8.0   /  SaO2: 98          Imaging:    ECG:    Physical Exam:  Gen NAD, vented  Cardiac: RRR, no murmurs, rubs gallops  Lungs: CTA b/l  Abdomen: +BS, soft, nontender, nondistended  Extremities: no C/C/E  Reeder in place        Assessment and Plan:   · Assessment		  58 yo F being treated for aspiration PNA     Problem/Plan - 1:  ·  Problem: Sepsis.  Plan: -pt remains on pressors  -titrate as tolerated.      Problem/Plan - 2:  ·  Problem: Hypothyroidism, unspecified type.  Plan: -c/w synthroid.      Problem/Plan - 3:  ·  Problem: Acute respiratory failure with hypoxia.  Plan: -SBT, wean off vent as tolerated.      Problem/Plan - 4:  ·  Problem: Aspiration pneumonia, unspecified aspiration pneumonia type, unspecified laterality, unspecified part of lung.  Plan: -c/w meropenem, vancomycin.

## 2018-02-05 NOTE — PROGRESS NOTE ADULT - SUBJECTIVE AND OBJECTIVE BOX
Patient is a 57y old  Female who presents with a chief complaint of hypoxia at NH. Pt had difficulty breathing after eating dinner, brought to ED. Pt previously hospitalized 1month ago for aspiration PNA. Pt intubated and placed on pressors.    Overnight events none - Stable - Still intubated. Still on pressors. On Precedex.     VITAL SIGNS     ICU Vital Signs Last 24 Hrs  T(C): 36.3 (05 Feb 2018 11:00), Max: 37.2 (05 Feb 2018 03:00)  T(F): 97.4 (05 Feb 2018 11:00), Max: 99 (05 Feb 2018 03:00)  HR: 41 (05 Feb 2018 07:01) (41 - 64)  BP: 136/64 (05 Feb 2018 07:01) (68/37 - 136/64)  BP(mean): 79 (05 Feb 2018 07:01) (47 - 88)  ABP: --  ABP(mean): --  RR: 23 (05 Feb 2018 11:00) (16 - 25)  SpO2: 98% (05 Feb 2018 11:00) (96% - 100%)    I&O's Detail    04 Feb 2018 07:01  -  05 Feb 2018 07:00  --------------------------------------------------------  IN:    dexmedetomidine Infusion: 228 mL    Enteral Tube Flush: 220 mL    IV PiggyBack: 100 mL    norepinephrine Infusion: 690 mL    Osmolite: 680 mL  Total IN: 1918 mL    OUT:    Indwelling Catheter - Urethral: 2335 mL  Total OUT: 2335 mL    Total NET: -417 mL      05 Feb 2018 07:01  -  05 Feb 2018 11:43  --------------------------------------------------------  IN:    dexmedetomidine Infusion: 50 mL    norepinephrine Infusion: 158 mL  Total IN: 208 mL    OUT:    Indwelling Catheter - Urethral: 415 mL  Total OUT: 415 mL    Total NET: -207 mL    PHYSICAL EXAM     Gen: Sedated on the vent   Heent: ETT   Lung : CTA   Heart: S1, S2 N   Abdomen : Soft, NT, ND   Exrtremities : Pedal Edema   Neuro : Sedated       Mode: Auto Mode: PRVC/ Volume Support  RR (machine): 16  TV (machine): 450  FiO2: 40  PEEP: 5  ITime: 1.3  MAP: 11  PIP: 31      LABS                          10.4   10.66 )-----------( 185      ( 04 Feb 2018 07:01 )             31.9       02-04    139  |  101  |  12  ----------------------------<  122<H>  4.1   |  31  |  0.6<L>    Ca    8.0<L>      04 Feb 2018 07:01  Mg     2.2     02-04    ABG - ( 04 Feb 2018 06:00 )  pH: x    7.51 /  pCO2: 39    /  pO2: 100   / HCO3: 31    / Base Excess: 8.0   /  SaO2: 98        PT/INR - ( 05 Feb 2018 10:43 )   PT: 12.50 sec;   INR: 1.15 ratio         PTT - ( 05 Feb 2018 06:08 )  PTT:44.3 sec    MEDS    MEDICATIONS  (STANDING):  chlorhexidine 0.12% Liquid 15 milliLiter(s) Swish and Spit two times a day  dexmedetomidine Infusion 0.499 MICROgram(s)/kG/Hr (8 mL/Hr) IV Continuous <Continuous>  docusate sodium 100 milliGRAM(s) Oral three times a day  heparin  Injectable (Preservative-Free) 5000 Unit(s) SubCutaneous every 8 hours  influenza   Vaccine 0.5 milliLiter(s) IntraMuscular once  levothyroxine 75 MICROGram(s) Oral daily  meropenem  IVPB 1000 milliGRAM(s) IV Intermittent every 8 hours  norepinephrine Infusion 0.208 MICROgram(s)/kG/Min (25 mL/Hr) IV Continuous <Continuous>  nystatin Powder 1 Application(s) Topical every 12 hours  nystatin/triamcinolone Ointment 1 Application(s) Topical every 12 hours  pantoprazole  Injectable 40 milliGRAM(s) IV Push every 24 hours  senna 1 Tablet(s) Oral every 12 hours  vancomycin  IVPB 1250 milliGRAM(s) IV Intermittent every 12 hours    MEDICATIONS  (PRN):  acetaminophen  Suppository 650 milliGRAM(s) Rectal every 4 hours PRN For Temp greater than 38 C (100.4 F)      Assesment and Plan     1.CNS  > Precedex for sedation     2. CVS -     >Septic shock  - Keep Levophed for MAP > 65     3. PULMONARY   >Aspiration pneumonia   - Vancomycin, meropenam   - SBT today   - Vent bundle - hob > 30, Aspiration precautions, Chlorhexidine mouth wash    4. INFECTIOUS DISEASE   As above     5. GI  none    6. RENAL   none    7. Endocrine   > Hypothyroid - on synthyroid     8. Hematology   none     9. DVT AND GI PROPHYLAXIS  - heparin     10. Dispo - ICU     11. Code Status - Full code - Patient is a 57y old  Female who presents with a chief complaint of hypoxia at NH. Pt had difficulty breathing after eating dinner, brought to ED. Pt previously hospitalized 1month ago for aspiration PNA. Pt intubated and placed on pressors.    Overnight events none - Stable - Still intubated. Still on pressors. On Precedex.     VITAL SIGNS     ICU Vital Signs Last 24 Hrs  T(C): 36.3 (05 Feb 2018 11:00), Max: 37.2 (05 Feb 2018 03:00)  T(F): 97.4 (05 Feb 2018 11:00), Max: 99 (05 Feb 2018 03:00)  HR: 41 (05 Feb 2018 07:01) (41 - 64)  BP: 136/64 (05 Feb 2018 07:01) (68/37 - 136/64)  BP(mean): 79 (05 Feb 2018 07:01) (47 - 88)    RR: 23 (05 Feb 2018 11:00) (16 - 25)  SpO2: 98% (05 Feb 2018 11:00) (96% - 100%)    I&O's Detail    04 Feb 2018 07:01  -  05 Feb 2018 07:00  --------------------------------------------------------  IN:    dexmedetomidine Infusion: 228 mL    Enteral Tube Flush: 220 mL    IV PiggyBack: 100 mL    norepinephrine Infusion: 690 mL    Osmolite: 680 mL  Total IN: 1918 mL    OUT:    Indwelling Catheter - Urethral: 2335 mL  Total OUT: 2335 mL    Total NET: -417 mL      05 Feb 2018 07:01  -  05 Feb 2018 11:43  --------------------------------------------------------  IN:    dexmedetomidine Infusion: 50 mL    norepinephrine Infusion: 158 mL  Total IN: 208 mL    OUT:    Indwelling Catheter - Urethral: 415 mL  Total OUT: 415 mL    Total NET: -207 mL    PHYSICAL EXAM     Gen: Sedated on the vent   Heent: ETT   Lung : CTA   Heart: S1, S2 N   Abdomen : Soft, NT, ND   Exrtremities : Pedal Edema   Neuro : Sedated       Mode: Auto Mode: PRVC/ Volume Support  RR (machine): 16  TV (machine): 450  FiO2: 40  PEEP: 5  ITime: 1.3  MAP: 11  PIP: 31      LABS                          10.4   10.66 )-----------( 185      ( 04 Feb 2018 07:01 )             31.9       02-04    139  |  101  |  12  ----------------------------<  122<H>  4.1   |  31  |  0.6<L>    Ca    8.0<L>      04 Feb 2018 07:01  Mg     2.2     02-04    ABG - ( 04 Feb 2018 06:00 )  pH: x    7.51 /  pCO2: 39    /  pO2: 100   / HCO3: 31    / Base Excess: 8.0   /  SaO2: 98        PT/INR - ( 05 Feb 2018 10:43 )   PT: 12.50 sec;   INR: 1.15 ratio         PTT - ( 05 Feb 2018 06:08 )  PTT:44.3 sec    MEDS    MEDICATIONS  (STANDING):  chlorhexidine 0.12% Liquid 15 milliLiter(s) Swish and Spit two times a day  dexmedetomidine Infusion 0.499 MICROgram(s)/kG/Hr (8 mL/Hr) IV Continuous <Continuous>  docusate sodium 100 milliGRAM(s) Oral three times a day  heparin  Injectable (Preservative-Free) 5000 Unit(s) SubCutaneous every 8 hours  influenza   Vaccine 0.5 milliLiter(s) IntraMuscular once  levothyroxine 75 MICROGram(s) Oral daily  meropenem  IVPB 1000 milliGRAM(s) IV Intermittent every 8 hours  norepinephrine Infusion 0.208 MICROgram(s)/kG/Min (25 mL/Hr) IV Continuous <Continuous>  nystatin Powder 1 Application(s) Topical every 12 hours  nystatin/triamcinolone Ointment 1 Application(s) Topical every 12 hours  pantoprazole  Injectable 40 milliGRAM(s) IV Push every 24 hours  senna 1 Tablet(s) Oral every 12 hours  vancomycin  IVPB 1250 milliGRAM(s) IV Intermittent every 12 hours    MEDICATIONS  (PRN):  acetaminophen  Suppository 650 milliGRAM(s) Rectal every 4 hours PRN For Temp greater than 38 C (100.4 F)      Assesment and Plan     1.CNS  > Precedex for sedation     2. CVS -     >Septic shock  - Keep Levophed for MAP > 65     3. PULMONARY   >Aspiration pneumonia   - Vancomycin, meropenam   - SBT today   - Vent bundle - hob > 30, Aspiration precautions, Chlorhexidine mouth wash  Surg consult for trach  withdraw ETT 1 cm.    4. INFECTIOUS DISEASE   As above     5. GI  none    6. RENAL   none    7. Endocrine   > Hypothyroid - on synthyroid     8. Hematology   none     9. DVT AND GI PROPHYLAXIS  - heparin     10. Dispo - ICU     11. Code Status - Full code -

## 2018-02-05 NOTE — PROGRESS NOTE ADULT - SUBJECTIVE AND OBJECTIVE BOX
[id number]    Patient is a 57y old  Female who presents with a chief complaint of hypoxia at NH. Pt had difficulty breathing after eating dinner, brought to ED. Pt previously hospitalized 1month ago for aspiration PNA. Pt intubated and placed on pressors.    PAST MEDICAL & SURGICAL HISTORY:  Down's syndrome  Hypothryoidism  Hemorhoids    MEDICATIONS  (STANDING):  chlorhexidine 0.12% Liquid 15 milliLiter(s) Swish and Spit two times a day  dexmedetomidine Infusion 0.499 MICROgram(s)/kG/Hr (8 mL/Hr) IV Continuous <Continuous>  docusate sodium 100 milliGRAM(s) Oral three times a day  heparin  Injectable (Preservative-Free) 5000 Unit(s) SubCutaneous every 8 hours  influenza   Vaccine 0.5 milliLiter(s) IntraMuscular once  levothyroxine 75 MICROGram(s) Oral daily  meropenem  IVPB 1000 milliGRAM(s) IV Intermittent every 8 hours  norepinephrine Infusion 0.208 MICROgram(s)/kG/Min (25 mL/Hr) IV Continuous <Continuous>  nystatin Powder 1 Application(s) Topical every 12 hours  nystatin/triamcinolone Ointment 1 Application(s) Topical every 12 hours  pantoprazole  Injectable 40 milliGRAM(s) IV Push every 24 hours  senna 1 Tablet(s) Oral every 12 hours    MEDICATIONS  (PRN):  acetaminophen  Suppository 650 milliGRAM(s) Rectal every 4 hours PRN For Temp greater than 38 C (100.4 F)    No Known Allergies      Overnight events:    Vital Signs Last 24 Hrs  T(C): 36.1 (05 Feb 2018 07:01), Max: 37.2 (05 Feb 2018 03:00)  T(F): 97 (05 Feb 2018 07:01), Max: 99 (05 Feb 2018 03:00)  HR: 44 (05 Feb 2018 05:01) (44 - 64)  BP: 116/55 (05 Feb 2018 05:01) (68/37 - 126/61)  BP(mean): 79 (05 Feb 2018 05:01) (47 - 88)  RR: 16 (05 Feb 2018 07:01) (16 - 25)  SpO2: 98% (05 Feb 2018 05:01) (96% - 99%)  CAPILLARY BLOOD GLUCOSE        I&O's Summary    04 Feb 2018 07:01  -  05 Feb 2018 07:00  --------------------------------------------------------  IN: 1918 mL / OUT: 2335 mL / NET: -417 mL    05 Feb 2018 07:01  -  05 Feb 2018 07:47  --------------------------------------------------------  IN: 0 mL / OUT: 60 mL / NET: -60 mL          Labs:                        10.4   10.66 )-----------( 185      ( 04 Feb 2018 07:01 )             31.9             02-04    139  |  101  |  12  ----------------------------<  122<H>  4.1   |  31  |  0.6<L>    Ca    8.0<L>      04 Feb 2018 07:01  Mg     2.2     02-04              PT/INR - ( 04 Feb 2018 07:01 )   PT: 11.90 sec;   INR: 1.10 ratio         PTT - ( 04 Feb 2018 07:01 )  PTT:41.0 sec      ABG - ( 04 Feb 2018 06:00 )  pH: x     /  pCO2: 39    /  pO2: 100   / HCO3: 31    / Base Excess: 8.0   /  SaO2: 98                      Imaging:    ECG: [id number]    Patient is a 57y old  Female who presents with a chief complaint of hypoxia at NH. Pt had difficulty breathing after eating dinner, brought to ED. Pt previously hospitalized 1month ago for aspiration PNA. Pt intubated and placed on pressors.    PAST MEDICAL & SURGICAL HISTORY:  Down's syndrome  Hypothryoidism  Hemorhoids    MEDICATIONS  (STANDING):  chlorhexidine 0.12% Liquid 15 milliLiter(s) Swish and Spit two times a day  dexmedetomidine Infusion 0.499 MICROgram(s)/kG/Hr (8 mL/Hr) IV Continuous <Continuous>  docusate sodium 100 milliGRAM(s) Oral three times a day  heparin  Injectable (Preservative-Free) 5000 Unit(s) SubCutaneous every 8 hours  influenza   Vaccine 0.5 milliLiter(s) IntraMuscular once  levothyroxine 75 MICROGram(s) Oral daily  meropenem  IVPB 1000 milliGRAM(s) IV Intermittent every 8 hours  norepinephrine Infusion 0.208 MICROgram(s)/kG/Min (25 mL/Hr) IV Continuous <Continuous>  nystatin Powder 1 Application(s) Topical every 12 hours  nystatin/triamcinolone Ointment 1 Application(s) Topical every 12 hours  pantoprazole  Injectable 40 milliGRAM(s) IV Push every 24 hours  senna 1 Tablet(s) Oral every 12 hours    MEDICATIONS  (PRN):  acetaminophen  Suppository 650 milliGRAM(s) Rectal every 4 hours PRN For Temp greater than 38 C (100.4 F)    No Known Allergies      Overnight events:    Vital Signs Last 24 Hrs  T(C): 36.1 (05 Feb 2018 07:01), Max: 37.2 (05 Feb 2018 03:00)  T(F): 97 (05 Feb 2018 07:01), Max: 99 (05 Feb 2018 03:00)  HR: 44 (05 Feb 2018 05:01) (44 - 64)  BP: 116/55 (05 Feb 2018 05:01) (68/37 - 126/61)  BP(mean): 79 (05 Feb 2018 05:01) (47 - 88)  RR: 16 (05 Feb 2018 07:01) (16 - 25)  SpO2: 98% (05 Feb 2018 05:01) (96% - 99%)  CAPILLARY BLOOD GLUCOSE        I&O's Summary    04 Feb 2018 07:01  -  05 Feb 2018 07:00  --------------------------------------------------------  IN: 1918 mL / OUT: 2335 mL / NET: -417 mL    05 Feb 2018 07:01  -  05 Feb 2018 07:47  --------------------------------------------------------  IN: 0 mL / OUT: 60 mL / NET: -60 mL          Labs:                        10.4   10.66 )-----------( 185      ( 04 Feb 2018 07:01 )             31.9             02-04    139  |  101  |  12  ----------------------------<  122<H>  4.1   |  31  |  0.6<L>    Ca    8.0<L>      04 Feb 2018 07:01  Mg     2.2     02-04              PT/INR - ( 04 Feb 2018 07:01 )   PT: 11.90 sec;   INR: 1.10 ratio         PTT - ( 04 Feb 2018 07:01 )  PTT:41.0 sec      ABG - ( 04 Feb 2018 06:00 )  pH: x     /  pCO2: 39    /  pO2: 100   / HCO3: 31    / Base Excess: 8.0   /  SaO2: 98          Imaging:    ECG:    Physical Exam:  Gen NAD, vented  Cardiac: RRR, no murmurs, rubs gallops  Lungs: CTA b/l  Abdomen: +BS, soft, nontender, nondistended  Extremities: no C/C/E  Reeder in place

## 2018-02-05 NOTE — PROGRESS NOTE ADULT - SUBJECTIVE AND OBJECTIVE BOX
Patient is a 57y old  Female who is currently sedated comfortably on vent    T(F): 98.7 (02-05-18 @ 15:00), Max: 99 (02-05-18 @ 03:00)  HR: 60 (02-05-18 @ 15:00)  BP: 109/52 (02-05-18 @ 15:00)  RR: 16 (02-05-18 @ 15:00)  SpO2: 99% (02-05-18 @ 11:50) (96% - 100%)    PHYSICAL EXAM:  GENERAL: NAD, well-groomed, well-developed  HEAD:  Atraumatic, Normocephalic  EYES: EOMI, PERRLA, conjunctiva and sclera clear  ENMT: No tonsillar erythema, exudates, or enlargement; Moist mucous membranes, Good dentition, No lesions  NECK: Supple, No JVD, Normal thyroid  NERVOUS SYSTEM:  no focal deficit  CHEST/LUNG: positive b/l rhonchi  HEART: Regular rate and rhythm; No murmurs, rubs, or gallops  ABDOMEN: Soft, Nontender, Nondistended; Bowel sounds present  EXTREMITIES:  2+ Peripheral Pulses, No clubbing, cyanosis, or edema  LYMPH: No lymphadenopathy noted  SKIN: No rashes or lesions    LABS  02-04    139  |  101  |  12  ----------------------------<  122<H>  4.1   |  31  |  0.6<L>    Ca    8.0<L>      04 Feb 2018 07:01  Mg     1.9     02-05                            10.5   9.20  )-----------( 198      ( 05 Feb 2018 12:44 )             31.8       PT/INR - ( 05 Feb 2018 10:43 )   PT: 12.50 sec;   INR: 1.15 ratio         PTT - ( 05 Feb 2018 06:08 )  PTT:44.3 sec  Mode: PS (Pressure Support)/ Spontaneous  RR (machine): 16  TV (machine): 450  FiO2: 40  PEEP: 5    CARDIAC ENZYMES          RADIOLOGY  < from: Xray Chest 1 View- PORTABLE-Routine (02.05.18 @ 06:26) >  Impression:      Bilateral opacities, stable. Support devices in place.      < end of copied text >    MEDICATIONS  (STANDING):  chlorhexidine 0.12% Liquid 15 milliLiter(s) Swish and Spit two times a day  dexmedetomidine Infusion 0.499 MICROgram(s)/kG/Hr (8 mL/Hr) IV Continuous <Continuous>  docusate sodium 100 milliGRAM(s) Oral three times a day  heparin  Injectable (Preservative-Free) 5000 Unit(s) SubCutaneous every 8 hours  influenza   Vaccine 0.5 milliLiter(s) IntraMuscular once  levothyroxine 75 MICROGram(s) Oral daily  norepinephrine Infusion 0.208 MICROgram(s)/kG/Min (25 mL/Hr) IV Continuous <Continuous>  nystatin Powder 1 Application(s) Topical every 12 hours  nystatin/triamcinolone Ointment 1 Application(s) Topical every 12 hours  pantoprazole  Injectable 40 milliGRAM(s) IV Push every 24 hours  senna 1 Tablet(s) Oral every 12 hours  vancomycin  IVPB 1250 milliGRAM(s) IV Intermittent every 12 hours    MEDICATIONS  (PRN):  acetaminophen  Suppository 650 milliGRAM(s) Rectal every 4 hours PRN For Temp greater than 38 C (100.4 F)

## 2018-02-06 ENCOUNTER — APPOINTMENT (OUTPATIENT)
Dept: PODIATRY | Facility: HOSPITAL | Age: 58
End: 2018-02-06

## 2018-02-06 LAB
ALBUMIN SERPL ELPH-MCNC: 1.8 G/DL — LOW (ref 3–5.5)
ALP SERPL-CCNC: 91 U/L — SIGNIFICANT CHANGE UP (ref 30–115)
ALT FLD-CCNC: 32 U/L — SIGNIFICANT CHANGE UP (ref 0–41)
ANION GAP SERPL CALC-SCNC: 7 MMOL/L — SIGNIFICANT CHANGE UP (ref 7–14)
AST SERPL-CCNC: 31 U/L — SIGNIFICANT CHANGE UP (ref 0–41)
BILIRUB SERPL-MCNC: 0.6 MG/DL — SIGNIFICANT CHANGE UP (ref 0.2–1.2)
BUN SERPL-MCNC: 13 MG/DL — SIGNIFICANT CHANGE UP (ref 10–20)
CALCIUM SERPL-MCNC: 8 MG/DL — LOW (ref 8.5–10.1)
CHLORIDE SERPL-SCNC: 103 MMOL/L — SIGNIFICANT CHANGE UP (ref 98–110)
CO2 SERPL-SCNC: 28 MMOL/L — SIGNIFICANT CHANGE UP (ref 17–32)
CREAT SERPL-MCNC: 0.6 MG/DL — LOW (ref 0.7–1.5)
GAS PNL BLDA: SIGNIFICANT CHANGE UP
GLUCOSE SERPL-MCNC: 149 MG/DL — HIGH (ref 70–110)
HCT VFR BLD CALC: 32.7 % — LOW (ref 37–47)
HGB BLD-MCNC: 10.8 G/DL — LOW (ref 14–18)
MAGNESIUM SERPL-MCNC: 2.1 MG/DL — SIGNIFICANT CHANGE UP (ref 1.8–2.4)
MCHC RBC-ENTMCNC: 32.8 PG — HIGH (ref 27–31)
MCHC RBC-ENTMCNC: 33 G/DL — SIGNIFICANT CHANGE UP (ref 32–37)
MCV RBC AUTO: 99.4 FL — HIGH (ref 81–91)
PLATELET # BLD AUTO: 221 K/UL — SIGNIFICANT CHANGE UP (ref 130–400)
POTASSIUM SERPL-MCNC: 3.7 MMOL/L — SIGNIFICANT CHANGE UP (ref 3.5–5)
POTASSIUM SERPL-SCNC: 3.7 MMOL/L — SIGNIFICANT CHANGE UP (ref 3.5–5)
PROT SERPL-MCNC: 5.2 G/DL — LOW (ref 6–8)
RBC # BLD: 3.29 M/UL — LOW (ref 4.2–5.4)
RBC # FLD: 16.4 % — HIGH (ref 11.5–14.5)
SODIUM SERPL-SCNC: 138 MMOL/L — SIGNIFICANT CHANGE UP (ref 135–146)
WBC # BLD: 11.31 K/UL — HIGH (ref 4.8–10.8)
WBC # FLD AUTO: 11.31 K/UL — HIGH (ref 4.8–10.8)

## 2018-02-06 RX ORDER — MEROPENEM 1 G/30ML
500 INJECTION INTRAVENOUS EVERY 8 HOURS
Qty: 0 | Refills: 0 | Status: DISCONTINUED | OUTPATIENT
Start: 2018-02-06 | End: 2018-02-07

## 2018-02-06 RX ADMIN — HEPARIN SODIUM 5000 UNIT(S): 5000 INJECTION INTRAVENOUS; SUBCUTANEOUS at 21:50

## 2018-02-06 RX ADMIN — Medication 25 MICROGRAM(S)/KG/MIN: at 10:43

## 2018-02-06 RX ADMIN — HEPARIN SODIUM 5000 UNIT(S): 5000 INJECTION INTRAVENOUS; SUBCUTANEOUS at 06:02

## 2018-02-06 RX ADMIN — CHLORHEXIDINE GLUCONATE 15 MILLILITER(S): 213 SOLUTION TOPICAL at 06:00

## 2018-02-06 RX ADMIN — SENNA PLUS 1 TABLET(S): 8.6 TABLET ORAL at 17:33

## 2018-02-06 RX ADMIN — Medication 166.67 MILLIGRAM(S): at 17:29

## 2018-02-06 RX ADMIN — MEROPENEM 100 MILLIGRAM(S): 1 INJECTION INTRAVENOUS at 21:49

## 2018-02-06 RX ADMIN — HEPARIN SODIUM 5000 UNIT(S): 5000 INJECTION INTRAVENOUS; SUBCUTANEOUS at 14:16

## 2018-02-06 RX ADMIN — DEXMEDETOMIDINE HYDROCHLORIDE IN 0.9% SODIUM CHLORIDE 8 MICROGRAM(S)/KG/HR: 4 INJECTION INTRAVENOUS at 14:20

## 2018-02-06 RX ADMIN — NYSTATIN CREAM 1 APPLICATION(S): 100000 CREAM TOPICAL at 17:32

## 2018-02-06 RX ADMIN — Medication 100 MILLIGRAM(S): at 14:14

## 2018-02-06 RX ADMIN — MEROPENEM 100 MILLIGRAM(S): 1 INJECTION INTRAVENOUS at 14:15

## 2018-02-06 RX ADMIN — DEXMEDETOMIDINE HYDROCHLORIDE IN 0.9% SODIUM CHLORIDE 8 MICROGRAM(S)/KG/HR: 4 INJECTION INTRAVENOUS at 10:43

## 2018-02-06 RX ADMIN — SENNA PLUS 1 TABLET(S): 8.6 TABLET ORAL at 06:03

## 2018-02-06 RX ADMIN — NYSTATIN CREAM 1 APPLICATION(S): 100000 CREAM TOPICAL at 06:03

## 2018-02-06 RX ADMIN — Medication 75 MICROGRAM(S): at 06:01

## 2018-02-06 RX ADMIN — Medication 25 MICROGRAM(S)/KG/MIN: at 14:20

## 2018-02-06 RX ADMIN — CHLORHEXIDINE GLUCONATE 15 MILLILITER(S): 213 SOLUTION TOPICAL at 17:33

## 2018-02-06 RX ADMIN — PANTOPRAZOLE SODIUM 40 MILLIGRAM(S): 20 TABLET, DELAYED RELEASE ORAL at 19:18

## 2018-02-06 RX ADMIN — Medication 100 MILLIGRAM(S): at 06:01

## 2018-02-06 RX ADMIN — Medication 1 APPLICATION(S): at 06:04

## 2018-02-06 RX ADMIN — Medication 1 APPLICATION(S): at 17:33

## 2018-02-06 RX ADMIN — Medication 25 MICROGRAM(S)/KG/MIN: at 19:15

## 2018-02-06 RX ADMIN — Medication 166.67 MILLIGRAM(S): at 06:02

## 2018-02-06 RX ADMIN — CHLORHEXIDINE GLUCONATE 15 MILLILITER(S): 213 SOLUTION TOPICAL at 11:35

## 2018-02-06 RX ADMIN — DEXMEDETOMIDINE HYDROCHLORIDE IN 0.9% SODIUM CHLORIDE 8 MICROGRAM(S)/KG/HR: 4 INJECTION INTRAVENOUS at 19:15

## 2018-02-06 RX ADMIN — Medication 100 MILLIGRAM(S): at 21:48

## 2018-02-06 NOTE — PROGRESS NOTE ADULT - ASSESSMENT
Assesment and Plan     1.CNS  > Precedex for sedation     2. CVS -     >Septic shock  - Keep Levophed for MAP > 65 - wean today     3. PULMONARY   >Aspiration pneumonia   - Vancomycin, meropenam - dc today last dose   - SBT failed yesterday - will try today again   - Vent bundle - hob > 30, Aspiration precautions, Chlorhexidine mouth wash  - Surg consult for trach and PEG   - ETT 1.5 cm from jonh but lip line is 20 - would keep the same     4. INFECTIOUS DISEASE   As above     5. GI  none    6. RENAL   none    7. Endocrine   > Hypothyroid - on synthyroid     8. Hematology   none     9. DVT AND GI PROPHYLAXIS  - heparin     10. Dispo - ICU     11. Code Status - Full code -

## 2018-02-06 NOTE — PROGRESS NOTE ADULT - SUBJECTIVE AND OBJECTIVE BOX
Patient is a 57y old  Female who presents with a chief complaint of hypoxia at NH. Pt had difficulty breathing after eating dinner, brought to ED. Pt previously hospitalized 1month ago for aspiration PNA. Pt intubated and placed on pressors.    Overnight events     none - Stable - Still intubated. Still on pressors. On Precedex.     VITAL SIGNS     ICU Vital Signs Last 24 Hrs  T(C): 36.7 (06 Feb 2018 08:00), Max: 37.2 (06 Feb 2018 01:00)  T(F): 98 (06 Feb 2018 08:00), Max: 99 (06 Feb 2018 01:00)  HR: 45 (06 Feb 2018 05:00) (41 - 76)  BP: 116/57 (06 Feb 2018 05:00) (101/49 - 122/58)  BP(mean): 82 (06 Feb 2018 05:00) (71 - 110)  ABP: --  ABP(mean): --  RR: 16 (06 Feb 2018 07:51) (16 - 35)  SpO2: 100% (06 Feb 2018 05:00) (92% - 100%)      I&O's Detail    05 Feb 2018 07:01  -  06 Feb 2018 07:00  --------------------------------------------------------  IN:    dexmedetomidine Infusion: 242 mL    Enteral Tube Flush: 90 mL    IV PiggyBack: 250 mL    norepinephrine Infusion: 724 mL    Osmolite: 960 mL  Total IN: 2266 mL    OUT:    Indwelling Catheter - Urethral: 2355 mL    Voided: 175 mL  Total OUT: 2530 mL    Total NET: -264 mL      06 Feb 2018 07:01  -  06 Feb 2018 09:13  --------------------------------------------------------  IN:  Total IN: 0 mL    OUT:    Voided: 120 mL  Total OUT: 120 mL    Total NET: -120 mL          Physical Exam    GENERAL: NAD, well-developed  HEAD:  Atraumatic, Normocephalic  EYES: EOMI, PERRLA, conjunctiva and sclera clear  NECK: Supple, No JVD  CHEST/LUNG: Clear to auscultation bilaterally; No wheeze  HEART: IrRegular rate and rhythm; No murmurs, rubs, or gallops  ABDOMEN: Soft, Nontender, Nondistended; Bowel sounds present  EXTREMITIES:  2+ Peripheral Pulses, No clubbing, cyanosis, or edema, Rt BKA   PSYCH: AAOx3  NEUROLOGY: non-focal  SKIN: No rashes or lesions    CAPILLARY BLOOD GLUCOSE      Mode: Auto Mode: PRVC/ Volume Support  RR (machine): 16  TV (machine): 450  FiO2: 40  PEEP: 5  MAP: 10  PIP: 28  PEEP 5      LABS                          10.8   11.31 )-----------( 221      ( 06 Feb 2018 07:18 )             32.7       02-05    139  |  104  |  14  ----------------------------<  116<H>  4.0   |  29  |  0.5<L>    Ca    8.0<L>      05 Feb 2018 12:44  Mg     1.9     02-05    TPro  4.9<L>  /  Alb  1.7<L>  /  TBili  0.4  /  DBili  x   /  AST  29  /  ALT  31  /  AlkPhos  95  02-05      LIVER FUNCTIONS - ( 05 Feb 2018 12:44 )  Alb: 1.7 g/dL / Pro: 4.9 g/dL / ALK PHOS: 95 U/L / ALT: 31 U/L / AST: 29 U/L / GGT: x             ABG - ( 06 Feb 2018 05:04 )  pH: 7.50  /  pCO2: 39    /  pO2: 110   / HCO3: 30    / Base Excess: 6.0   /  SaO2: 99            PT/INR - ( 05 Feb 2018 10:43 )   PT: 12.50 sec;   INR: 1.15 ratio         PTT - ( 05 Feb 2018 06:08 )  PTT:44.3 sec    MEDS    MEDICATIONS  (STANDING):  chlorhexidine 0.12% Liquid 15 milliLiter(s) Swish and Spit two times a day  dexmedetomidine Infusion 0.499 MICROgram(s)/kG/Hr (8 mL/Hr) IV Continuous <Continuous>  docusate sodium 100 milliGRAM(s) Oral three times a day  heparin  Injectable (Preservative-Free) 5000 Unit(s) SubCutaneous every 8 hours  influenza   Vaccine 0.5 milliLiter(s) IntraMuscular once  levothyroxine 75 MICROGram(s) Oral daily  meropenem  IVPB 500 milliGRAM(s) IV Intermittent every 8 hours  norepinephrine Infusion 0.208 MICROgram(s)/kG/Min (25 mL/Hr) IV Continuous <Continuous>  nystatin Powder 1 Application(s) Topical every 12 hours  nystatin/triamcinolone Ointment 1 Application(s) Topical every 12 hours  pantoprazole  Injectable 40 milliGRAM(s) IV Push every 24 hours  senna 1 Tablet(s) Oral every 12 hours  vancomycin  IVPB 1250 milliGRAM(s) IV Intermittent every 12 hours    MEDICATIONS  (PRN):  acetaminophen  Suppository 650 milliGRAM(s) Rectal every 4 hours PRN For Temp greater than 38 C (100.4 F) Patient is a 57y old  Female who presents with a chief complaint of hypoxia at NH. Pt had difficulty breathing after eating dinner, brought to ED. Pt previously hospitalized 1month ago for aspiration PNA. Pt intubated and placed on pressors.    Overnight events     none - Stable - Still intubated. Still on pressors. On Precedex.     VITAL SIGNS     ICU Vital Signs Last 24 Hrs  T(C): 36.7 (06 Feb 2018 08:00), Max: 37.2 (06 Feb 2018 01:00)  T(F): 98 (06 Feb 2018 08:00), Max: 99 (06 Feb 2018 01:00)  HR: 45 (06 Feb 2018 05:00) (41 - 76)  BP: 116/57 (06 Feb 2018 05:00) (101/49 - 122/58)  BP(mean): 82 (06 Feb 2018 05:00) (71 - 110)  ABP: --  ABP(mean): --  RR: 16 (06 Feb 2018 07:51) (16 - 35)  SpO2: 100% (06 Feb 2018 05:00) (92% - 100%)      I&O's Detail    05 Feb 2018 07:01  -  06 Feb 2018 07:00  --------------------------------------------------------  IN:    dexmedetomidine Infusion: 242 mL    Enteral Tube Flush: 90 mL    IV PiggyBack: 250 mL    norepinephrine Infusion: 724 mL    Osmolite: 960 mL  Total IN: 2266 mL    OUT:    Indwelling Catheter - Urethral: 2355 mL    Voided: 175 mL  Total OUT: 2530 mL    Total NET: -264 mL      06 Feb 2018 07:01  -  06 Feb 2018 09:13  --------------------------------------------------------  IN:  Total IN: 0 mL    OUT:    Voided: 120 mL  Total OUT: 120 mL    Total NET: -120 mL          Physical Exam    GENERAL: NAD, well-developed  HEAD:  Atraumatic, Normocephalic  EYES: EOMI, PERRLA, conjunctiva and sclera clear  NECK: Supple, No JVD  CHEST/LUNG: Clear to auscultation bilaterally; No wheeze  HEART: IrRegular rate and rhythm; No murmurs, rubs, or gallops  ABDOMEN: Soft, Nontender, Nondistended; Bowel sounds present  EXTREMITIES:  2+ Peripheral Pulses, No clubbing, cyanosis, or edema, Rt BKA   PSYCH: sedated on vent  NEUROLOGY: non-focal  SKIN: No rashes or lesions      Mode: Auto Mode: PRVC/ Volume Support  RR (machine): 16  TV (machine): 450  FiO2: 40  PEEP: 5  MAP: 10  PIP: 28  PEEP 5      LABS                          10.8   11.31 )-----------( 221      ( 06 Feb 2018 07:18 )             32.7       02-05    139  |  104  |  14  ----------------------------<  116<H>  4.0   |  29  |  0.5<L>    Ca    8.0<L>      05 Feb 2018 12:44  Mg     1.9     02-05    TPro  4.9<L>  /  Alb  1.7<L>  /  TBili  0.4  /  DBili  x   /  AST  29  /  ALT  31  /  AlkPhos  95  02-05      LIVER FUNCTIONS - ( 05 Feb 2018 12:44 )  Alb: 1.7 g/dL / Pro: 4.9 g/dL / ALK PHOS: 95 U/L / ALT: 31 U/L / AST: 29 U/L / GGT: x             ABG - ( 06 Feb 2018 05:04 )  pH: 7.50  /  pCO2: 39    /  pO2: 110   / HCO3: 30    / Base Excess: 6.0   /  SaO2: 99            PT/INR - ( 05 Feb 2018 10:43 )   PT: 12.50 sec;   INR: 1.15 ratio         PTT - ( 05 Feb 2018 06:08 )  PTT:44.3 sec    MEDS    MEDICATIONS  (STANDING):  chlorhexidine 0.12% Liquid 15 milliLiter(s) Swish and Spit two times a day  dexmedetomidine Infusion 0.499 MICROgram(s)/kG/Hr (8 mL/Hr) IV Continuous <Continuous>  docusate sodium 100 milliGRAM(s) Oral three times a day  heparin  Injectable (Preservative-Free) 5000 Unit(s) SubCutaneous every 8 hours  influenza   Vaccine 0.5 milliLiter(s) IntraMuscular once  levothyroxine 75 MICROGram(s) Oral daily  meropenem  IVPB 500 milliGRAM(s) IV Intermittent every 8 hours  norepinephrine Infusion 0.208 MICROgram(s)/kG/Min (25 mL/Hr) IV Continuous <Continuous>  nystatin Powder 1 Application(s) Topical every 12 hours  nystatin/triamcinolone Ointment 1 Application(s) Topical every 12 hours  pantoprazole  Injectable 40 milliGRAM(s) IV Push every 24 hours  senna 1 Tablet(s) Oral every 12 hours  vancomycin  IVPB 1250 milliGRAM(s) IV Intermittent every 12 hours    MEDICATIONS  (PRN):  acetaminophen  Suppository 650 milliGRAM(s) Rectal every 4 hours PRN For Temp greater than 38 C (100.4 F)

## 2018-02-06 NOTE — PROGRESS NOTE ADULT - SUBJECTIVE AND OBJECTIVE BOX
[id number]  Patient is a 57y old  Female who presents with a chief complaint of hypoxia at NH. Pt had difficulty breathing after eating dinner, brought to ED. Pt previously hospitalized 1month ago for aspiration PNA. Pt intubated and placed on pressors.    PAST MEDICAL & SURGICAL HISTORY:  Hypothryoidism  Hemorrhoids  Down's Synrome    MEDICATIONS  (STANDING):  chlorhexidine 0.12% Liquid 15 milliLiter(s) Swish and Spit two times a day  dexmedetomidine Infusion 0.499 MICROgram(s)/kG/Hr (8 mL/Hr) IV Continuous <Continuous>  docusate sodium 100 milliGRAM(s) Oral three times a day  heparin  Injectable (Preservative-Free) 5000 Unit(s) SubCutaneous every 8 hours  influenza   Vaccine 0.5 milliLiter(s) IntraMuscular once  levothyroxine 75 MICROGram(s) Oral daily  norepinephrine Infusion 0.208 MICROgram(s)/kG/Min (25 mL/Hr) IV Continuous <Continuous>  nystatin Powder 1 Application(s) Topical every 12 hours  nystatin/triamcinolone Ointment 1 Application(s) Topical every 12 hours  pantoprazole  Injectable 40 milliGRAM(s) IV Push every 24 hours  senna 1 Tablet(s) Oral every 12 hours  vancomycin  IVPB 1250 milliGRAM(s) IV Intermittent every 12 hours    MEDICATIONS  (PRN):  acetaminophen  Suppository 650 milliGRAM(s) Rectal every 4 hours PRN For Temp greater than 38 C (100.4 F)    No Known Allergies      Overnight events:    Vital Signs Last 24 Hrs  T(C): 36.3 (06 Feb 2018 05:00), Max: 37.2 (06 Feb 2018 01:00)  T(F): 97.3 (06 Feb 2018 05:00), Max: 99 (06 Feb 2018 01:00)  HR: 45 (06 Feb 2018 05:00) (41 - 76)  BP: 116/57 (06 Feb 2018 05:00) (91/45 - 122/58)  BP(mean): 82 (06 Feb 2018 05:00) (71 - 110)  RR: 16 (06 Feb 2018 05:00) (16 - 35)  SpO2: 100% (06 Feb 2018 05:00) (92% - 100%)  CAPILLARY BLOOD GLUCOSE        I&O's Summary    05 Feb 2018 07:01  -  06 Feb 2018 07:00  --------------------------------------------------------  IN: 2266 mL / OUT: 2530 mL / NET: -264 mL          Labs:                        10.8   11.31 )-----------( 221      ( 06 Feb 2018 07:18 )             32.7             02-05    139  |  104  |  14  ----------------------------<  116<H>  4.0   |  29  |  0.5<L>    Ca    8.0<L>      05 Feb 2018 12:44  Mg     1.9     02-05    TPro  4.9<L>  /  Alb  1.7<L>  /  TBili  0.4  /  DBili  x   /  AST  29  /  ALT  31  /  AlkPhos  95  02-05    LIVER FUNCTIONS - ( 05 Feb 2018 12:44 )  Alb: 1.7 g/dL / Pro: 4.9 g/dL / ALK PHOS: 95 U/L / ALT: 31 U/L / AST: 29 U/L / GGT: x                 PT/INR - ( 05 Feb 2018 10:43 )   PT: 12.50 sec;   INR: 1.15 ratio         PTT - ( 05 Feb 2018 06:08 )  PTT:44.3 sec      ABG - ( 06 Feb 2018 05:04 )  pH: 7.50  /  pCO2: 39    /  pO2: 110   / HCO3: 30    / Base Excess: 6.0   /  SaO2: 99                      Imaging:    ECG:

## 2018-02-07 LAB
BASOPHILS # BLD AUTO: 0.09 K/UL — SIGNIFICANT CHANGE UP (ref 0–0.2)
BASOPHILS NFR BLD AUTO: 0.9 % — SIGNIFICANT CHANGE UP (ref 0–1)
EOSINOPHIL # BLD AUTO: 0.73 K/UL — HIGH (ref 0–0.7)
EOSINOPHIL NFR BLD AUTO: 7.3 % — SIGNIFICANT CHANGE UP (ref 0–8)
HCT VFR BLD CALC: 32.8 % — LOW (ref 37–47)
HGB BLD-MCNC: 10.6 G/DL — LOW (ref 14–18)
IMM GRANULOCYTES NFR BLD AUTO: 3 % — HIGH (ref 0.1–0.3)
LYMPHOCYTES # BLD AUTO: 1.39 K/UL — SIGNIFICANT CHANGE UP (ref 1.2–3.4)
LYMPHOCYTES # BLD AUTO: 13.8 % — LOW (ref 20.5–51.1)
MCHC RBC-ENTMCNC: 32.2 PG — HIGH (ref 27–31)
MCHC RBC-ENTMCNC: 32.3 G/DL — SIGNIFICANT CHANGE UP (ref 32–37)
MCV RBC AUTO: 99.7 FL — HIGH (ref 81–91)
MONOCYTES # BLD AUTO: 0.48 K/UL — SIGNIFICANT CHANGE UP (ref 0.1–0.6)
MONOCYTES NFR BLD AUTO: 4.8 % — SIGNIFICANT CHANGE UP (ref 1.7–9.3)
NEUTROPHILS # BLD AUTO: 7.07 K/UL — HIGH (ref 1.4–6.5)
NEUTROPHILS NFR BLD AUTO: 70.2 % — SIGNIFICANT CHANGE UP (ref 42.2–75.2)
PLATELET # BLD AUTO: 236 K/UL — SIGNIFICANT CHANGE UP (ref 130–400)
RBC # BLD: 3.29 M/UL — LOW (ref 4.2–5.4)
RBC # FLD: 16.5 % — HIGH (ref 11.5–14.5)
WBC # BLD: 10.06 K/UL — SIGNIFICANT CHANGE UP (ref 4.8–10.8)
WBC # FLD AUTO: 10.06 K/UL — SIGNIFICANT CHANGE UP (ref 4.8–10.8)

## 2018-02-07 RX ADMIN — Medication 1 APPLICATION(S): at 06:28

## 2018-02-07 RX ADMIN — DEXMEDETOMIDINE HYDROCHLORIDE IN 0.9% SODIUM CHLORIDE 8 MICROGRAM(S)/KG/HR: 4 INJECTION INTRAVENOUS at 04:00

## 2018-02-07 RX ADMIN — Medication 25 MICROGRAM(S)/KG/MIN: at 07:00

## 2018-02-07 RX ADMIN — Medication 100 MILLIGRAM(S): at 06:24

## 2018-02-07 RX ADMIN — Medication 25 MICROGRAM(S)/KG/MIN: at 19:31

## 2018-02-07 RX ADMIN — DEXMEDETOMIDINE HYDROCHLORIDE IN 0.9% SODIUM CHLORIDE 8 MICROGRAM(S)/KG/HR: 4 INJECTION INTRAVENOUS at 07:00

## 2018-02-07 RX ADMIN — DEXMEDETOMIDINE HYDROCHLORIDE IN 0.9% SODIUM CHLORIDE 8 MICROGRAM(S)/KG/HR: 4 INJECTION INTRAVENOUS at 00:00

## 2018-02-07 RX ADMIN — DEXMEDETOMIDINE HYDROCHLORIDE IN 0.9% SODIUM CHLORIDE 8 MICROGRAM(S)/KG/HR: 4 INJECTION INTRAVENOUS at 19:32

## 2018-02-07 RX ADMIN — CHLORHEXIDINE GLUCONATE 15 MILLILITER(S): 213 SOLUTION TOPICAL at 17:02

## 2018-02-07 RX ADMIN — NYSTATIN CREAM 1 APPLICATION(S): 100000 CREAM TOPICAL at 17:02

## 2018-02-07 RX ADMIN — HEPARIN SODIUM 5000 UNIT(S): 5000 INJECTION INTRAVENOUS; SUBCUTANEOUS at 21:33

## 2018-02-07 RX ADMIN — Medication 100 MILLIGRAM(S): at 21:30

## 2018-02-07 RX ADMIN — Medication 1 APPLICATION(S): at 17:04

## 2018-02-07 RX ADMIN — MEROPENEM 100 MILLIGRAM(S): 1 INJECTION INTRAVENOUS at 05:10

## 2018-02-07 RX ADMIN — Medication 166.67 MILLIGRAM(S): at 06:27

## 2018-02-07 RX ADMIN — DEXMEDETOMIDINE HYDROCHLORIDE IN 0.9% SODIUM CHLORIDE 8 MICROGRAM(S)/KG/HR: 4 INJECTION INTRAVENOUS at 21:46

## 2018-02-07 RX ADMIN — Medication 100 MILLIGRAM(S): at 13:13

## 2018-02-07 RX ADMIN — HEPARIN SODIUM 5000 UNIT(S): 5000 INJECTION INTRAVENOUS; SUBCUTANEOUS at 13:20

## 2018-02-07 RX ADMIN — Medication 75 MICROGRAM(S): at 06:26

## 2018-02-07 RX ADMIN — PANTOPRAZOLE SODIUM 40 MILLIGRAM(S): 20 TABLET, DELAYED RELEASE ORAL at 13:18

## 2018-02-07 RX ADMIN — CHLORHEXIDINE GLUCONATE 15 MILLILITER(S): 213 SOLUTION TOPICAL at 06:24

## 2018-02-07 RX ADMIN — SENNA PLUS 1 TABLET(S): 8.6 TABLET ORAL at 06:28

## 2018-02-07 RX ADMIN — HEPARIN SODIUM 5000 UNIT(S): 5000 INJECTION INTRAVENOUS; SUBCUTANEOUS at 06:28

## 2018-02-07 RX ADMIN — DEXMEDETOMIDINE HYDROCHLORIDE IN 0.9% SODIUM CHLORIDE 8 MICROGRAM(S)/KG/HR: 4 INJECTION INTRAVENOUS at 14:00

## 2018-02-07 RX ADMIN — NYSTATIN CREAM 1 APPLICATION(S): 100000 CREAM TOPICAL at 06:26

## 2018-02-07 NOTE — PROGRESS NOTE ADULT - SUBJECTIVE AND OBJECTIVE BOX
Patient is a 57y old  Female who presents with a chief complaint of recurrent aspiration pna's    Overnight events     none   Still on levophed   failed SBT today - got tachypneic     VITAL SIGNS     ICU Vital Signs Last 24 Hrs  T(C): 36.6 (07 Feb 2018 07:44), Max: 37.1 (07 Feb 2018 01:00)  T(F): 97.9 (07 Feb 2018 07:44), Max: 98.7 (07 Feb 2018 01:00)  HR: 53 (07 Feb 2018 10:05) (44 - 73)  BP: 84/42 (07 Feb 2018 09:00) (70/36 - 167/74)  BP(mean): 60 (07 Feb 2018 09:00) (47 - 106)  RR: 21 (07 Feb 2018 09:00) (16 - 31)  SpO2: 99% (07 Feb 2018 10:05) (95% - 100%)    I&O's Detail    06 Feb 2018 07:01  -  07 Feb 2018 07:00  --------------------------------------------------------  IN:    dexmedetomidine Infusion: 210 mL    Enteral Tube Flush: 310 mL    IV PiggyBack: 650 mL    norepinephrine Infusion: 612 mL    Osmolite: 800 mL  Total IN: 2582 mL    OUT:    Indwelling Catheter - Urethral: 1850 mL  Total OUT: 1850 mL    Total NET: 732 mL      07 Feb 2018 07:01  -  07 Feb 2018 10:17  --------------------------------------------------------  IN:    dexmedetomidine Infusion: 30 mL    norepinephrine Infusion: 73 mL  Total IN: 103 mL    OUT:    Indwelling Catheter - Urethral: 255 mL  Total OUT: 255 mL    Total NET: -152 mL    Physical Exam    GENERAL: NAD,  HEAD:  Atraumatic, Normocephalic  EYES: EOMI, PERRLA, conjunctiva and sclera clear  NECK: Supple, No JVD  CHEST/LUNG: Rll ronchi   HEART: IrRegular rate and rhythm; No murmurs, rubs, or gallops  ABDOMEN: Soft, Nontender, Nondistended; Bowel sounds present  EXTREMITIES:  2+ Peripheral Pulses, No clubbing, cyanosis, or edema  PSYCH: awake on the vent   NEUROLOGY: non-focal  SKIN: No rashes or lesions    Mode: Auto Mode: PRVC/ Volume Support  RR (machine): 16  TV (machine): 450  FiO2: 40  PEEP: 5  MAP: 14  PIP: 30    LABS                          10.6   10.06 )-----------( 236      ( 07 Feb 2018 06:24 )             32.8       02-06    138  |  103  |  13  ----------------------------<  149<H>  3.7   |  28  |  0.6<L>    Ca    8.0<L>      06 Feb 2018 07:18  Mg     2.1     02-06    TPro  5.2<L>  /  Alb  1.8<L>  /  TBili  0.6  /  DBili  x   /  AST  31  /  ALT  32  /  AlkPhos  91  02-06      LIVER FUNCTIONS - ( 06 Feb 2018 07:18 )  Alb: 1.8 g/dL / Pro: 5.2 g/dL / ALK PHOS: 91 U/L / ALT: 32 U/L / AST: 31 U/L / GGT: x             ABG - ( 06 Feb 2018 05:04 )  pH: 7.50  /  pCO2: 39    /  pO2: 110   / HCO3: 30    / Base Excess: 6.0   /  SaO2: 99        PT/INR - ( 05 Feb 2018 10:43 )   PT: 12.50 sec;   INR: 1.15 ratio       MEDS    MEDICATIONS  (STANDING):  chlorhexidine 0.12% Liquid 15 milliLiter(s) Swish and Spit two times a day  dexmedetomidine Infusion 0.499 MICROgram(s)/kG/Hr (8 mL/Hr) IV Continuous <Continuous>  docusate sodium 100 milliGRAM(s) Oral three times a day  heparin  Injectable (Preservative-Free) 5000 Unit(s) SubCutaneous every 8 hours  influenza   Vaccine 0.5 milliLiter(s) IntraMuscular once  levothyroxine 75 MICROGram(s) Oral daily  meropenem  IVPB 500 milliGRAM(s) IV Intermittent every 8 hours  norepinephrine Infusion 0.208 MICROgram(s)/kG/Min (25 mL/Hr) IV Continuous <Continuous>  nystatin Powder 1 Application(s) Topical every 12 hours  nystatin/triamcinolone Ointment 1 Application(s) Topical every 12 hours  pantoprazole  Injectable 40 milliGRAM(s) IV Push every 24 hours  senna 1 Tablet(s) Oral every 12 hours  vancomycin  IVPB 1250 milliGRAM(s) IV Intermittent every 12 hours    MEDICATIONS  (PRN):  acetaminophen  Suppository 650 milliGRAM(s) Rectal every 4 hours PRN For Temp greater than 38 C (100.4 F)

## 2018-02-07 NOTE — PROGRESS NOTE ADULT - SUBJECTIVE AND OBJECTIVE BOX
[id number]    Patient is a 57y old  Female who presents with a chief complaint of     PAST MEDICAL & SURGICAL HISTORY:      MEDICATIONS  (STANDING):  chlorhexidine 0.12% Liquid 15 milliLiter(s) Swish and Spit two times a day  dexmedetomidine Infusion 0.499 MICROgram(s)/kG/Hr (8 mL/Hr) IV Continuous <Continuous>  docusate sodium 100 milliGRAM(s) Oral three times a day  heparin  Injectable (Preservative-Free) 5000 Unit(s) SubCutaneous every 8 hours  influenza   Vaccine 0.5 milliLiter(s) IntraMuscular once  levothyroxine 75 MICROGram(s) Oral daily  meropenem  IVPB 500 milliGRAM(s) IV Intermittent every 8 hours  norepinephrine Infusion 0.208 MICROgram(s)/kG/Min (25 mL/Hr) IV Continuous <Continuous>  nystatin Powder 1 Application(s) Topical every 12 hours  nystatin/triamcinolone Ointment 1 Application(s) Topical every 12 hours  pantoprazole  Injectable 40 milliGRAM(s) IV Push every 24 hours  senna 1 Tablet(s) Oral every 12 hours  vancomycin  IVPB 1250 milliGRAM(s) IV Intermittent every 12 hours    MEDICATIONS  (PRN):  acetaminophen  Suppository 650 milliGRAM(s) Rectal every 4 hours PRN For Temp greater than 38 C (100.4 F)    No Known Allergies      Overnight events:    Vital Signs Last 24 Hrs  T(C): 36.6 (07 Feb 2018 07:44), Max: 37.1 (07 Feb 2018 01:00)  T(F): 97.9 (07 Feb 2018 07:44), Max: 98.7 (07 Feb 2018 01:00)  HR: 56 (07 Feb 2018 05:48) (42 - 73)  BP: 111/53 (07 Feb 2018 07:44) (70/36 - 167/74)  BP(mean): 76 (07 Feb 2018 07:44) (47 - 106)  RR: 19 (07 Feb 2018 07:44) (16 - 31)  SpO2: 99% (07 Feb 2018 07:44) (95% - 100%)  CAPILLARY BLOOD GLUCOSE        I&O's Summary    06 Feb 2018 07:01  -  07 Feb 2018 07:00  --------------------------------------------------------  IN: 2582 mL / OUT: 1850 mL / NET: 732 mL    07 Feb 2018 07:01  -  07 Feb 2018 07:57  --------------------------------------------------------  IN: 0 mL / OUT: 30 mL / NET: -30 mL          Labs:                        10.6   10.06 )-----------( 236      ( 07 Feb 2018 06:24 )             32.8             02-06    138  |  103  |  13  ----------------------------<  149<H>  3.7   |  28  |  0.6<L>    Ca    8.0<L>      06 Feb 2018 07:18  Mg     2.1     02-06    TPro  5.2<L>  /  Alb  1.8<L>  /  TBili  0.6  /  DBili  x   /  AST  31  /  ALT  32  /  AlkPhos  91  02-06    LIVER FUNCTIONS - ( 06 Feb 2018 07:18 )  Alb: 1.8 g/dL / Pro: 5.2 g/dL / ALK PHOS: 91 U/L / ALT: 32 U/L / AST: 31 U/L / GGT: x                 PT/INR - ( 05 Feb 2018 10:43 )   PT: 12.50 sec;   INR: 1.15 ratio               ABG - ( 06 Feb 2018 05:04 )  pH: 7.50  /  pCO2: 39    /  pO2: 110   / HCO3: 30    / Base Excess: 6.0   /  SaO2: 99                      Imaging:    ECG: [id number]    Patient is a 57y old  Female who presents with a chief complaint of hypoxia at NH. Pt had difficulty breathing after eating dinner, brought to ED. Pt previously hospitalized 1month ago for aspiration PNA. Previously couldn't tolerate barium swallow. Pt intubated and placed on pressors. Pt has been unable to tolerate levophed weaning and SBT. Surgery consulted for trach and peg.    PAST MEDICAL & SURGICAL HISTORY:      MEDICATIONS  (STANDING):  chlorhexidine 0.12% Liquid 15 milliLiter(s) Swish and Spit two times a day  dexmedetomidine Infusion 0.499 MICROgram(s)/kG/Hr (8 mL/Hr) IV Continuous <Continuous>  docusate sodium 100 milliGRAM(s) Oral three times a day  heparin  Injectable (Preservative-Free) 5000 Unit(s) SubCutaneous every 8 hours  influenza   Vaccine 0.5 milliLiter(s) IntraMuscular once  levothyroxine 75 MICROGram(s) Oral daily  meropenem  IVPB 500 milliGRAM(s) IV Intermittent every 8 hours  norepinephrine Infusion 0.208 MICROgram(s)/kG/Min (25 mL/Hr) IV Continuous <Continuous>  nystatin Powder 1 Application(s) Topical every 12 hours  nystatin/triamcinolone Ointment 1 Application(s) Topical every 12 hours  pantoprazole  Injectable 40 milliGRAM(s) IV Push every 24 hours  senna 1 Tablet(s) Oral every 12 hours  vancomycin  IVPB 1250 milliGRAM(s) IV Intermittent every 12 hours    MEDICATIONS  (PRN):  acetaminophen  Suppository 650 milliGRAM(s) Rectal every 4 hours PRN For Temp greater than 38 C (100.4 F)    No Known Allergies      Overnight events:    Vital Signs Last 24 Hrs  T(C): 36.6 (07 Feb 2018 07:44), Max: 37.1 (07 Feb 2018 01:00)  T(F): 97.9 (07 Feb 2018 07:44), Max: 98.7 (07 Feb 2018 01:00)  HR: 56 (07 Feb 2018 05:48) (42 - 73)  BP: 111/53 (07 Feb 2018 07:44) (70/36 - 167/74)  BP(mean): 76 (07 Feb 2018 07:44) (47 - 106)  RR: 19 (07 Feb 2018 07:44) (16 - 31)  SpO2: 99% (07 Feb 2018 07:44) (95% - 100%)  CAPILLARY BLOOD GLUCOSE        I&O's Summary    06 Feb 2018 07:01  -  07 Feb 2018 07:00  --------------------------------------------------------  IN: 2582 mL / OUT: 1850 mL / NET: 732 mL    07 Feb 2018 07:01  -  07 Feb 2018 07:57  --------------------------------------------------------  IN: 0 mL / OUT: 30 mL / NET: -30 mL          Labs:                        10.6   10.06 )-----------( 236      ( 07 Feb 2018 06:24 )             32.8             02-06    138  |  103  |  13  ----------------------------<  149<H>  3.7   |  28  |  0.6<L>    Ca    8.0<L>      06 Feb 2018 07:18  Mg     2.1     02-06    TPro  5.2<L>  /  Alb  1.8<L>  /  TBili  0.6  /  DBili  x   /  AST  31  /  ALT  32  /  AlkPhos  91  02-06    LIVER FUNCTIONS - ( 06 Feb 2018 07:18 )  Alb: 1.8 g/dL / Pro: 5.2 g/dL / ALK PHOS: 91 U/L / ALT: 32 U/L / AST: 31 U/L / GGT: x                 PT/INR - ( 05 Feb 2018 10:43 )   PT: 12.50 sec;   INR: 1.15 ratio       ABG - ( 06 Feb 2018 05:04 )  pH: 7.50  /  pCO2: 39    /  pO2: 110   / HCO3: 30    / Base Excess: 6.0   /  SaO2: 99        Mode: Auto Mode: PRVC/ Volume Support  RR (machine): 16  TV (machine): 450  FiO2: 40  PEEP: 5  MAP: 14  PIP: 30    PHYSICAL EXAM:  GENERAL: NAD  NECK: Supple, No JVD  CHEST/LUNG: Clear to auscultation bilaterally; No wheeze; No crackles;   HEART: Regular rate and rhythm; No murmurs;   ABDOMEN: Soft, Nontender, Nondistended; Bowel sounds present; No guarding  EXTREMITIES:   No cyanosis or edema  NEUROLOGY: non-focal  SKIN: No rashes or lesions    +Right IJ without erythema  +zarate

## 2018-02-07 NOTE — PROGRESS NOTE ADULT - ASSESSMENT
Assesment and Plan     1.CNS  > Precedex for sedation - keep rass - 2. Sedation holiday q 24     2. CVS -     >Septic shock  - Keep Levophed for MAP > 65 - wean today     3. PULMONARY   >Aspiration pneumonia   - Vancomycin, meropenam - dc today   - SBT failed today   - Vent bundle - hob > 30, Aspiration precautions, Chlorhexidine mouth wash  - Surg consult for trach and PEG   - ETT 1.5 cm from jonh but lip line is 20 - would keep the same     4. INFECTIOUS DISEASE   As above     5. GI  none    6. RENAL   none    7. Endocrine   > Hypothyroid - on synthyroid     8. Hematology   none     9. DVT AND GI PROPHYLAXIS  - heparin     10. Dispo - ICU     11. Code Status - Full code -

## 2018-02-08 ENCOUNTER — APPOINTMENT (OUTPATIENT)
Age: 58
End: 2018-02-08

## 2018-02-08 LAB
ALBUMIN SERPL ELPH-MCNC: 1.9 G/DL — LOW (ref 3–5.5)
ALP SERPL-CCNC: 76 U/L — SIGNIFICANT CHANGE UP (ref 30–115)
ALT FLD-CCNC: 37 U/L — SIGNIFICANT CHANGE UP (ref 0–41)
ANION GAP SERPL CALC-SCNC: 5 MMOL/L — LOW (ref 7–14)
AST SERPL-CCNC: 34 U/L — SIGNIFICANT CHANGE UP (ref 0–41)
BASE EXCESS BLDA CALC-SCNC: 5.5 MMOL/L — HIGH (ref -2–2)
BASOPHILS # BLD AUTO: 0.07 K/UL — SIGNIFICANT CHANGE UP (ref 0–0.2)
BASOPHILS NFR BLD AUTO: 0.7 % — SIGNIFICANT CHANGE UP (ref 0–1)
BILIRUB SERPL-MCNC: 0.6 MG/DL — SIGNIFICANT CHANGE UP (ref 0.2–1.2)
BUN SERPL-MCNC: 14 MG/DL — SIGNIFICANT CHANGE UP (ref 10–20)
CALCIUM SERPL-MCNC: 8 MG/DL — LOW (ref 8.5–10.1)
CHLORIDE SERPL-SCNC: 102 MMOL/L — SIGNIFICANT CHANGE UP (ref 98–110)
CO2 SERPL-SCNC: 30 MMOL/L — SIGNIFICANT CHANGE UP (ref 17–32)
CREAT SERPL-MCNC: 0.6 MG/DL — LOW (ref 0.7–1.5)
EOSINOPHIL # BLD AUTO: 0.46 K/UL — SIGNIFICANT CHANGE UP (ref 0–0.7)
EOSINOPHIL NFR BLD AUTO: 4.7 % — SIGNIFICANT CHANGE UP (ref 0–8)
GLUCOSE SERPL-MCNC: 138 MG/DL — HIGH (ref 70–110)
HCO3 BLDA-SCNC: 30 MMOL/L — HIGH (ref 23–27)
HCT VFR BLD CALC: 31.9 % — LOW (ref 37–47)
HGB BLD-MCNC: 10.3 G/DL — LOW (ref 14–18)
HOROWITZ INDEX BLDA+IHG-RTO: 40 — SIGNIFICANT CHANGE UP
IMM GRANULOCYTES NFR BLD AUTO: 2 % — HIGH (ref 0.1–0.3)
LYMPHOCYTES # BLD AUTO: 1.34 K/UL — SIGNIFICANT CHANGE UP (ref 1.2–3.4)
LYMPHOCYTES # BLD AUTO: 13.7 % — LOW (ref 20.5–51.1)
MAGNESIUM SERPL-MCNC: 2.1 MG/DL — SIGNIFICANT CHANGE UP (ref 1.8–2.4)
MCHC RBC-ENTMCNC: 32.1 PG — HIGH (ref 27–31)
MCHC RBC-ENTMCNC: 32.3 G/DL — SIGNIFICANT CHANGE UP (ref 32–37)
MCV RBC AUTO: 99.4 FL — HIGH (ref 81–91)
MONOCYTES # BLD AUTO: 0.48 K/UL — SIGNIFICANT CHANGE UP (ref 0.1–0.6)
MONOCYTES NFR BLD AUTO: 4.9 % — SIGNIFICANT CHANGE UP (ref 1.7–9.3)
NEUTROPHILS # BLD AUTO: 7.23 K/UL — HIGH (ref 1.4–6.5)
NEUTROPHILS NFR BLD AUTO: 74 % — SIGNIFICANT CHANGE UP (ref 42.2–75.2)
PCO2 BLDA: 41 MMHG — SIGNIFICANT CHANGE UP (ref 38–42)
PH BLDA: 7.47 — HIGH (ref 7.38–7.42)
PLATELET # BLD AUTO: 254 K/UL — SIGNIFICANT CHANGE UP (ref 130–400)
PO2 BLDA: 111 MMHG — HIGH (ref 78–95)
POTASSIUM SERPL-MCNC: 4.3 MMOL/L — SIGNIFICANT CHANGE UP (ref 3.5–5)
POTASSIUM SERPL-SCNC: 4.3 MMOL/L — SIGNIFICANT CHANGE UP (ref 3.5–5)
PROT SERPL-MCNC: 5.4 G/DL — LOW (ref 6–8)
RBC # BLD: 3.21 M/UL — LOW (ref 4.2–5.4)
RBC # FLD: 16.7 % — HIGH (ref 11.5–14.5)
SAO2 % BLDA: 98 % — SIGNIFICANT CHANGE UP (ref 94–98)
SODIUM SERPL-SCNC: 137 MMOL/L — SIGNIFICANT CHANGE UP (ref 135–146)
WBC # BLD: 9.78 K/UL — SIGNIFICANT CHANGE UP (ref 4.8–10.8)
WBC # FLD AUTO: 9.78 K/UL — SIGNIFICANT CHANGE UP (ref 4.8–10.8)

## 2018-02-08 RX ORDER — MIDODRINE HYDROCHLORIDE 2.5 MG/1
5 TABLET ORAL THREE TIMES A DAY
Qty: 0 | Refills: 0 | Status: DISCONTINUED | OUTPATIENT
Start: 2018-02-08 | End: 2018-02-09

## 2018-02-08 RX ADMIN — CHLORHEXIDINE GLUCONATE 15 MILLILITER(S): 213 SOLUTION TOPICAL at 05:27

## 2018-02-08 RX ADMIN — NYSTATIN CREAM 1 APPLICATION(S): 100000 CREAM TOPICAL at 05:32

## 2018-02-08 RX ADMIN — SENNA PLUS 1 TABLET(S): 8.6 TABLET ORAL at 17:58

## 2018-02-08 RX ADMIN — PANTOPRAZOLE SODIUM 40 MILLIGRAM(S): 20 TABLET, DELAYED RELEASE ORAL at 15:48

## 2018-02-08 RX ADMIN — Medication 100 MILLIGRAM(S): at 21:36

## 2018-02-08 RX ADMIN — HEPARIN SODIUM 5000 UNIT(S): 5000 INJECTION INTRAVENOUS; SUBCUTANEOUS at 21:37

## 2018-02-08 RX ADMIN — HEPARIN SODIUM 5000 UNIT(S): 5000 INJECTION INTRAVENOUS; SUBCUTANEOUS at 15:40

## 2018-02-08 RX ADMIN — DEXMEDETOMIDINE HYDROCHLORIDE IN 0.9% SODIUM CHLORIDE 8 MICROGRAM(S)/KG/HR: 4 INJECTION INTRAVENOUS at 03:30

## 2018-02-08 RX ADMIN — MIDODRINE HYDROCHLORIDE 5 MILLIGRAM(S): 2.5 TABLET ORAL at 13:34

## 2018-02-08 RX ADMIN — HEPARIN SODIUM 5000 UNIT(S): 5000 INJECTION INTRAVENOUS; SUBCUTANEOUS at 05:26

## 2018-02-08 RX ADMIN — Medication 100 MILLIGRAM(S): at 05:32

## 2018-02-08 RX ADMIN — Medication 25 MICROGRAM(S)/KG/MIN: at 07:00

## 2018-02-08 RX ADMIN — DEXMEDETOMIDINE HYDROCHLORIDE IN 0.9% SODIUM CHLORIDE 8 MICROGRAM(S)/KG/HR: 4 INJECTION INTRAVENOUS at 17:00

## 2018-02-08 RX ADMIN — DEXMEDETOMIDINE HYDROCHLORIDE IN 0.9% SODIUM CHLORIDE 8 MICROGRAM(S)/KG/HR: 4 INJECTION INTRAVENOUS at 07:00

## 2018-02-08 RX ADMIN — Medication 1 APPLICATION(S): at 05:33

## 2018-02-08 RX ADMIN — NYSTATIN CREAM 1 APPLICATION(S): 100000 CREAM TOPICAL at 18:00

## 2018-02-08 RX ADMIN — Medication 100 MILLIGRAM(S): at 13:35

## 2018-02-08 RX ADMIN — DEXMEDETOMIDINE HYDROCHLORIDE IN 0.9% SODIUM CHLORIDE 8 MICROGRAM(S)/KG/HR: 4 INJECTION INTRAVENOUS at 12:00

## 2018-02-08 RX ADMIN — CHLORHEXIDINE GLUCONATE 15 MILLILITER(S): 213 SOLUTION TOPICAL at 17:59

## 2018-02-08 RX ADMIN — Medication 25 MICROGRAM(S)/KG/MIN: at 19:25

## 2018-02-08 RX ADMIN — DEXMEDETOMIDINE HYDROCHLORIDE IN 0.9% SODIUM CHLORIDE 8 MICROGRAM(S)/KG/HR: 4 INJECTION INTRAVENOUS at 19:24

## 2018-02-08 RX ADMIN — MIDODRINE HYDROCHLORIDE 5 MILLIGRAM(S): 2.5 TABLET ORAL at 10:37

## 2018-02-08 RX ADMIN — Medication 25 MICROGRAM(S)/KG/MIN: at 18:27

## 2018-02-08 RX ADMIN — Medication 1 APPLICATION(S): at 18:03

## 2018-02-08 RX ADMIN — SENNA PLUS 1 TABLET(S): 8.6 TABLET ORAL at 05:33

## 2018-02-08 RX ADMIN — MIDODRINE HYDROCHLORIDE 5 MILLIGRAM(S): 2.5 TABLET ORAL at 21:36

## 2018-02-08 RX ADMIN — Medication 75 MICROGRAM(S): at 05:32

## 2018-02-08 NOTE — PROGRESS NOTE ADULT - ASSESSMENT
Assesment and Plan     1.CNS  > Precedex for sedation - keep rass - 2. Sedation holiday q 24     2. CVS -     >Septic shock  - Keep Levophed for MAP > 65 - wean today   - Start Midodrine 5 tid     3. PULMONARY   >Aspiration pneumonia   - s/p antibiotics   - SBT failed multiple times   - Vent bundle - hob > 30, Aspiration precautions, Chlorhexidine mouth wash  - Surg consult for trach and PEG - possible on friday   - ETT 1 cm from jonh but lip line is 20 - would keep the same   - Change IJ TLC     4. INFECTIOUS DISEASE   As above     5. GI  PPI  Tube feeds     6. RENAL   none    7. Endocrine   > Hypothyroid - on synthyroid     8. Hematology   none     9. DVT AND GI PROPHYLAXIS  - heparin     10. Dispo - ICU     11. Code Status - Full code

## 2018-02-08 NOTE — PROGRESS NOTE ADULT - SUBJECTIVE AND OBJECTIVE BOX
[id number]    Patient is a 57y old  Female who presents with a chief complaint of hypoxia at NH. Pt had difficulty breathing after eating dinner, brought to ED. Pt previously hospitalized 1month ago for aspiration PNA. Previously couldn't tolerate barium swallow. Pt intubated and placed on pressors. Pt has been unable to tolerate levophed weaning and SBT. Midodrine started today. Surgery consulted for trach and peg.      PAST MEDICAL & SURGICAL HISTORY:      MEDICATIONS  (STANDING):  chlorhexidine 0.12% Liquid 15 milliLiter(s) Swish and Spit two times a day  dexmedetomidine Infusion 0.499 MICROgram(s)/kG/Hr (8 mL/Hr) IV Continuous <Continuous>  docusate sodium 100 milliGRAM(s) Oral three times a day  heparin  Injectable (Preservative-Free) 5000 Unit(s) SubCutaneous every 8 hours  influenza   Vaccine 0.5 milliLiter(s) IntraMuscular once  levothyroxine 75 MICROGram(s) Oral daily  midodrine 5 milliGRAM(s) Oral three times a day  norepinephrine Infusion 0.208 MICROgram(s)/kG/Min (25 mL/Hr) IV Continuous <Continuous>  nystatin Powder 1 Application(s) Topical every 12 hours  nystatin/triamcinolone Ointment 1 Application(s) Topical every 12 hours  pantoprazole  Injectable 40 milliGRAM(s) IV Push every 24 hours  senna 1 Tablet(s) Oral every 12 hours    MEDICATIONS  (PRN):  acetaminophen  Suppository 650 milliGRAM(s) Rectal every 4 hours PRN For Temp greater than 38 C (100.4 F)    No Known Allergies      Overnight events:    Vital Signs Last 24 Hrs  T(C): 36.2 (08 Feb 2018 07:01), Max: 37.4 (08 Feb 2018 01:00)  T(F): 97.1 (08 Feb 2018 07:01), Max: 99.4 (08 Feb 2018 01:00)  HR: 51 (08 Feb 2018 06:00) (44 - 72)  BP: 95/65 (08 Feb 2018 06:00) (80/53 - 121/65)  BP(mean): 74 (08 Feb 2018 06:00) (60 - 82)  RR: 18 (08 Feb 2018 07:01) (16 - 24)  SpO2: 96% (08 Feb 2018 06:00) (94% - 100%)  CAPILLARY BLOOD GLUCOSE        I&O's Summary    07 Feb 2018 07:01  -  08 Feb 2018 07:00  --------------------------------------------------------  IN: 1873 mL / OUT: 1870 mL / NET: 3 mL    08 Feb 2018 07:01  -  08 Feb 2018 07:55  --------------------------------------------------------  IN: 0 mL / OUT: 30 mL / NET: -30 mL          PHYSICAL EXAM:  GENERAL: NAD, speaks in full sentences, no signs of respiratory distress  HEAD:  Atraumatic, Normocephalic  EYES: EOMI, PERRLA, conjunctiva and sclera clear  NECK: Supple, No JVD  CHEST/LUNG: Clear to auscultation bilaterally; No wheeze; No crackles; No accessory muscles used  HEART: Regular rate and rhythm; No murmurs;   ABDOMEN: Soft, Nontender, Nondistended; Bowel sounds present; No guarding  EXTREMITIES:  2+ Peripheral Pulses, No cyanosis. Trace edema  PSYCH: AAOx3  NEUROLOGY: non-focal  SKIN: No rashes or lesions, right IJ no evidence of infection    Labs:                        10.3   9.78  )-----------( 254      ( 08 Feb 2018 05:57 )             31.9                                 ABG - ( 08 Feb 2018 06:33 )  pH: 7.47  /  pCO2: 41    /  pO2: 111   / HCO3: 30    / Base Excess: 5.5   /  SaO2: 98                      Mode: Auto Mode: PRVC/ Volume Support  RR (machine): 16  TV (machine): 450  FiO2: 40  PEEP: 5  MAP: 13  PIP: 36

## 2018-02-08 NOTE — PROGRESS NOTE ADULT - SUBJECTIVE AND OBJECTIVE BOX
Overnight events     None   On precedex and Levophed     VITAL SIGNS     ICU Vital Signs Last 24 Hrs  T(C): 36.8 (08 Feb 2018 08:00), Max: 37.4 (08 Feb 2018 01:00)  T(F): 98.3 (08 Feb 2018 08:00), Max: 99.4 (08 Feb 2018 01:00)  HR: 51 (08 Feb 2018 06:00) (44 - 72)  BP: 95/65 (08 Feb 2018 06:00) (80/53 - 121/65)  BP(mean): 74 (08 Feb 2018 06:00) (63 - 82)  RR: 18 (08 Feb 2018 07:01) (16 - 24)  SpO2: 96% (08 Feb 2018 06:00) (94% - 100%)    I&O's Detail    07 Feb 2018 07:01  -  08 Feb 2018 07:00  --------------------------------------------------------  IN:    dexmedetomidine Infusion: 240 mL    Enteral Tube Flush: 135 mL    norepinephrine Infusion: 598 mL    Osmolite: 900 mL  Total IN: 1873 mL    OUT:    Indwelling Catheter - Urethral: 1870 mL  Total OUT: 1870 mL    Total NET: 3 mL      08 Feb 2018 07:01  -  08 Feb 2018 09:32  --------------------------------------------------------  IN:  Total IN: 0 mL    OUT:    Indwelling Catheter - Urethral: 100 mL  Total OUT: 100 mL    Total NET: -100 mL    Physical Exam    GENERAL: NAD, on vent   HEAD:  Atraumatic, Normocephalic  EYES: EOMI, PERRLA, conjunctiva and sclera clear  NECK: Supple, No JVD, rt IJ TLC  CHEST/LUNG: Clear to auscultation bilaterally; No wheeze  HEART: Regular rate and rhythm; No murmurs, rubs, or gallops  ABDOMEN: Soft, Nontender, Nondistended; Bowel sounds present  EXTREMITIES:  2+ Peripheral Pulses, No clubbing, cyanosis, or edema  PSYCH: Sedated on vent, responds to verbal stimuli   NEUROLOGY: non-focal  SKIN: No rashes or lesions    Mode: Auto Mode: PRVC/ Volume Support  RR (machine): 16  TV (machine): 450  FiO2: 40  PEEP: 5  MAP: 13  PIP: 36  PEEP 5     LABS                          10.3   9.78  )-----------( 254      ( 08 Feb 2018 05:57 )             31.9       ABG - ( 08 Feb 2018 06:33 )  pH: 7.47  /  pCO2: 41    /  pO2: 111   / HCO3: 30    / Base Excess: 5.5   /  SaO2: 98        MEDS    MEDICATIONS  (STANDING):  chlorhexidine 0.12% Liquid 15 milliLiter(s) Swish and Spit two times a day  dexmedetomidine Infusion 0.499 MICROgram(s)/kG/Hr (8 mL/Hr) IV Continuous <Continuous>  docusate sodium 100 milliGRAM(s) Oral three times a day  heparin  Injectable (Preservative-Free) 5000 Unit(s) SubCutaneous every 8 hours  influenza   Vaccine 0.5 milliLiter(s) IntraMuscular once  levothyroxine 75 MICROGram(s) Oral daily  midodrine 5 milliGRAM(s) Oral three times a day  norepinephrine Infusion 0.208 MICROgram(s)/kG/Min (25 mL/Hr) IV Continuous <Continuous>  nystatin Powder 1 Application(s) Topical every 12 hours  nystatin/triamcinolone Ointment 1 Application(s) Topical every 12 hours  pantoprazole  Injectable 40 milliGRAM(s) IV Push every 24 hours  senna 1 Tablet(s) Oral every 12 hours    MEDICATIONS  (PRN):  acetaminophen  Suppository 650 milliGRAM(s) Rectal every 4 hours PRN For Temp greater than 38 C (100.4 F)

## 2018-02-08 NOTE — PROGRESS NOTE ADULT - ASSESSMENT
56 yo F being treated for aspiration PNA. Unable to tolerated SBT, pressor weaning. Midodrine started.

## 2018-02-09 DIAGNOSIS — I95.9 HYPOTENSION, UNSPECIFIED: ICD-10-CM

## 2018-02-09 LAB
ALBUMIN SERPL ELPH-MCNC: 2 G/DL — LOW (ref 3–5.5)
ALP SERPL-CCNC: 69 U/L — SIGNIFICANT CHANGE UP (ref 30–115)
ALT FLD-CCNC: 34 U/L — SIGNIFICANT CHANGE UP (ref 0–41)
ANION GAP SERPL CALC-SCNC: 5 MMOL/L — LOW (ref 7–14)
APTT BLD: 54.6 SEC — HIGH (ref 27–39.2)
AST SERPL-CCNC: 33 U/L — SIGNIFICANT CHANGE UP (ref 0–41)
BASE EXCESS BLDA CALC-SCNC: 6.6 MMOL/L — HIGH (ref -2–2)
BASOPHILS # BLD AUTO: 0.08 K/UL — SIGNIFICANT CHANGE UP (ref 0–0.2)
BASOPHILS NFR BLD AUTO: 1 % — SIGNIFICANT CHANGE UP (ref 0–1)
BILIRUB SERPL-MCNC: 0.5 MG/DL — SIGNIFICANT CHANGE UP (ref 0.2–1.2)
BUN SERPL-MCNC: 14 MG/DL — SIGNIFICANT CHANGE UP (ref 10–20)
CALCIUM SERPL-MCNC: 8.2 MG/DL — LOW (ref 8.5–10.1)
CHLORIDE SERPL-SCNC: 103 MMOL/L — SIGNIFICANT CHANGE UP (ref 98–110)
CO2 SERPL-SCNC: 30 MMOL/L — SIGNIFICANT CHANGE UP (ref 17–32)
CREAT SERPL-MCNC: 0.6 MG/DL — LOW (ref 0.7–1.5)
EOSINOPHIL # BLD AUTO: 0.54 K/UL — SIGNIFICANT CHANGE UP (ref 0–0.7)
EOSINOPHIL NFR BLD AUTO: 6.5 % — SIGNIFICANT CHANGE UP (ref 0–8)
GLUCOSE SERPL-MCNC: 125 MG/DL — HIGH (ref 70–110)
HCO3 BLDA-SCNC: 30 MMOL/L — HIGH (ref 23–27)
HCT VFR BLD CALC: 32.2 % — LOW (ref 37–47)
HGB BLD-MCNC: 10.6 G/DL — LOW (ref 14–18)
HOROWITZ INDEX BLDA+IHG-RTO: 40 — SIGNIFICANT CHANGE UP
IMM GRANULOCYTES NFR BLD AUTO: 2.2 % — HIGH (ref 0.1–0.3)
INR BLD: 1.15 RATIO — SIGNIFICANT CHANGE UP (ref 0.65–1.3)
LYMPHOCYTES # BLD AUTO: 1.65 K/UL — SIGNIFICANT CHANGE UP (ref 1.2–3.4)
LYMPHOCYTES # BLD AUTO: 20 % — LOW (ref 20.5–51.1)
MAGNESIUM SERPL-MCNC: 2 MG/DL — SIGNIFICANT CHANGE UP (ref 1.8–2.4)
MCHC RBC-ENTMCNC: 32.2 PG — HIGH (ref 27–31)
MCHC RBC-ENTMCNC: 32.9 G/DL — SIGNIFICANT CHANGE UP (ref 32–37)
MCV RBC AUTO: 97.9 FL — HIGH (ref 81–91)
MONOCYTES # BLD AUTO: 0.52 K/UL — SIGNIFICANT CHANGE UP (ref 0.1–0.6)
MONOCYTES NFR BLD AUTO: 6.3 % — SIGNIFICANT CHANGE UP (ref 1.7–9.3)
NEUTROPHILS # BLD AUTO: 5.3 K/UL — SIGNIFICANT CHANGE UP (ref 1.4–6.5)
NEUTROPHILS NFR BLD AUTO: 64 % — SIGNIFICANT CHANGE UP (ref 42.2–75.2)
PCO2 BLDA: 38 MMHG — SIGNIFICANT CHANGE UP (ref 38–42)
PH BLDA: 7.51 — HIGH (ref 7.38–7.42)
PLATELET # BLD AUTO: 261 K/UL — SIGNIFICANT CHANGE UP (ref 130–400)
PO2 BLDA: 91 MMHG — SIGNIFICANT CHANGE UP (ref 78–95)
POTASSIUM SERPL-MCNC: 3.8 MMOL/L — SIGNIFICANT CHANGE UP (ref 3.5–5)
POTASSIUM SERPL-SCNC: 3.8 MMOL/L — SIGNIFICANT CHANGE UP (ref 3.5–5)
PROT SERPL-MCNC: 5.6 G/DL — LOW (ref 6–8)
PROTHROM AB SERPL-ACNC: 12.5 SEC — SIGNIFICANT CHANGE UP (ref 9.95–12.87)
RBC # BLD: 3.29 M/UL — LOW (ref 4.2–5.4)
RBC # FLD: 16.6 % — HIGH (ref 11.5–14.5)
SAO2 % BLDA: 97 % — SIGNIFICANT CHANGE UP (ref 94–98)
SODIUM SERPL-SCNC: 138 MMOL/L — SIGNIFICANT CHANGE UP (ref 135–146)
WBC # BLD: 8.27 K/UL — SIGNIFICANT CHANGE UP (ref 4.8–10.8)
WBC # FLD AUTO: 8.27 K/UL — SIGNIFICANT CHANGE UP (ref 4.8–10.8)

## 2018-02-09 RX ORDER — DEXMEDETOMIDINE HYDROCHLORIDE IN 0.9% SODIUM CHLORIDE 4 UG/ML
0.5 INJECTION INTRAVENOUS
Qty: 200 | Refills: 0 | Status: DISCONTINUED | OUTPATIENT
Start: 2018-02-09 | End: 2018-02-13

## 2018-02-09 RX ORDER — MIDODRINE HYDROCHLORIDE 2.5 MG/1
10 TABLET ORAL EVERY 8 HOURS
Qty: 0 | Refills: 0 | Status: DISCONTINUED | OUTPATIENT
Start: 2018-02-09 | End: 2018-02-23

## 2018-02-09 RX ORDER — MIDODRINE HYDROCHLORIDE 2.5 MG/1
5 TABLET ORAL ONCE
Qty: 0 | Refills: 0 | Status: COMPLETED | OUTPATIENT
Start: 2018-02-09 | End: 2018-02-09

## 2018-02-09 RX ORDER — MORPHINE SULFATE 50 MG/1
2 CAPSULE, EXTENDED RELEASE ORAL ONCE
Qty: 0 | Refills: 0 | Status: DISCONTINUED | OUTPATIENT
Start: 2018-02-09 | End: 2018-02-09

## 2018-02-09 RX ADMIN — Medication 1 MILLIGRAM(S): at 21:03

## 2018-02-09 RX ADMIN — CHLORHEXIDINE GLUCONATE 15 MILLILITER(S): 213 SOLUTION TOPICAL at 05:22

## 2018-02-09 RX ADMIN — DEXMEDETOMIDINE HYDROCHLORIDE IN 0.9% SODIUM CHLORIDE 8 MICROGRAM(S)/KG/HR: 4 INJECTION INTRAVENOUS at 00:30

## 2018-02-09 RX ADMIN — CHLORHEXIDINE GLUCONATE 15 MILLILITER(S): 213 SOLUTION TOPICAL at 18:30

## 2018-02-09 RX ADMIN — DEXMEDETOMIDINE HYDROCHLORIDE IN 0.9% SODIUM CHLORIDE 8 MICROGRAM(S)/KG/HR: 4 INJECTION INTRAVENOUS at 07:33

## 2018-02-09 RX ADMIN — Medication 25 MICROGRAM(S)/KG/MIN: at 07:35

## 2018-02-09 RX ADMIN — DEXMEDETOMIDINE HYDROCHLORIDE IN 0.9% SODIUM CHLORIDE 8 MICROGRAM(S)/KG/HR: 4 INJECTION INTRAVENOUS at 06:33

## 2018-02-09 RX ADMIN — MORPHINE SULFATE 2 MILLIGRAM(S): 50 CAPSULE, EXTENDED RELEASE ORAL at 20:55

## 2018-02-09 RX ADMIN — MIDODRINE HYDROCHLORIDE 5 MILLIGRAM(S): 2.5 TABLET ORAL at 05:25

## 2018-02-09 RX ADMIN — Medication 25 MICROGRAM(S)/KG/MIN: at 06:33

## 2018-02-09 RX ADMIN — MIDODRINE HYDROCHLORIDE 10 MILLIGRAM(S): 2.5 TABLET ORAL at 11:29

## 2018-02-09 RX ADMIN — MIDODRINE HYDROCHLORIDE 5 MILLIGRAM(S): 2.5 TABLET ORAL at 09:10

## 2018-02-09 RX ADMIN — Medication 1 APPLICATION(S): at 05:29

## 2018-02-09 RX ADMIN — Medication 75 MICROGRAM(S): at 05:25

## 2018-02-09 RX ADMIN — HEPARIN SODIUM 5000 UNIT(S): 5000 INJECTION INTRAVENOUS; SUBCUTANEOUS at 22:42

## 2018-02-09 RX ADMIN — PANTOPRAZOLE SODIUM 40 MILLIGRAM(S): 20 TABLET, DELAYED RELEASE ORAL at 13:19

## 2018-02-09 RX ADMIN — HEPARIN SODIUM 5000 UNIT(S): 5000 INJECTION INTRAVENOUS; SUBCUTANEOUS at 05:28

## 2018-02-09 RX ADMIN — NYSTATIN CREAM 1 APPLICATION(S): 100000 CREAM TOPICAL at 18:31

## 2018-02-09 RX ADMIN — NYSTATIN CREAM 1 APPLICATION(S): 100000 CREAM TOPICAL at 05:26

## 2018-02-09 RX ADMIN — MORPHINE SULFATE 2 MILLIGRAM(S): 50 CAPSULE, EXTENDED RELEASE ORAL at 20:36

## 2018-02-09 RX ADMIN — Medication 1 APPLICATION(S): at 18:31

## 2018-02-09 NOTE — PROGRESS NOTE ADULT - SUBJECTIVE AND OBJECTIVE BOX
Patient is a 57y old  Female who presents with a chief complaint of aspiration pneumonia     Overnight events     none   MAP better with midodrine   Potential surg today   not constipated     REVIEW OF SYSTEMS    -ve other than above     VITAL SIGNS     ICU Vital Signs Last 24 Hrs  T(C): 36.2 (09 Feb 2018 07:01), Max: 37.3 (09 Feb 2018 02:00)  T(F): 97.2 (09 Feb 2018 07:01), Max: 99.2 (09 Feb 2018 02:00)  HR: 44 (09 Feb 2018 07:17) (42 - 70)  BP: 107/53 (09 Feb 2018 07:17) (90/43 - 109/54)  BP(mean): 77 (09 Feb 2018 07:17) (62 - 77)  RR: 17 (09 Feb 2018 07:17) (16 - 51)  SpO2: 98% (09 Feb 2018 07:17) (95% - 100%)    I&O's Detail    08 Feb 2018 07:01  -  09 Feb 2018 07:00  --------------------------------------------------------  IN:    dexmedetomidine Infusion: 240 mL    Enteral Tube Flush: 240 mL    norepinephrine Infusion: 502 mL    Osmolite: 640 mL  Total IN: 1622 mL    OUT:    Indwelling Catheter - Urethral: 1705 mL  Total OUT: 1705 mL    Total NET: -83 mL    09 Feb 2018 07:01  -  09 Feb 2018 08:17  --------------------------------------------------------  IN:  Total IN: 0 mL    OUT:    Indwelling Catheter - Urethral: 40 mL  Total OUT: 40 mL    Total NET: -40 mL    Physical Exam    GENERAL: NAD, awake on precedex  HEAD:  Atraumatic, Normocephalic  EYES: EOMI, PERRLA, conjunctiva and sclera clear  NECK: Supple, No JVD, left TLC IJ   CHEST/LUNG: Clear to auscultation bilaterally; No wheeze  HEART: Regular rate and rhythm; No murmurs, rubs, or gallops  ABDOMEN: Soft, Nontender, Nondistended; Bowel sounds present  EXTREMITIES:  2+ Peripheral Pulses, No clubbing, cyanosis, or edema  PSYCH: Alert awake on vent   NEUROLOGY: non-focal  SKIN: No rashes or lesions    Mode: Auto Mode: PRVC/ Volume Support  RR (machine): 16  TV (machine): 450  FiO2: 40  PEEP: 5  MAP: 11  PIP: 31    LABS                        10.6   8.27  )-----------( 261      ( 09 Feb 2018 06:05 )             32.2       02-09    138  |  103  |  14  ----------------------------<  125<H>  3.8   |  30  |  0.6<L>    Ca    8.2<L>      09 Feb 2018 06:03  Mg     2.0     02-09    TPro  5.6<L>  /  Alb  2.0<L>  /  TBili  0.5  /  DBili  x   /  AST  33  /  ALT  34  /  AlkPhos  69  02-09      LIVER FUNCTIONS - ( 09 Feb 2018 06:03 )  Alb: 2.0 g/dL / Pro: 5.6 g/dL / ALK PHOS: 69 U/L / ALT: 34 U/L / AST: 33 U/L / GGT: x             ABG - ( 09 Feb 2018 06:58 )  pH: 7.51  /  pCO2: 38    /  pO2: 91    / HCO3: 30    / Base Excess: 6.6   /  SaO2: 97        PT/INR - ( 09 Feb 2018 06:05 )   PT: 12.50 sec;   INR: 1.15 ratio         PTT - ( 09 Feb 2018 06:05 )  PTT:54.6 sec    MEDS    MEDICATIONS  (STANDING):  chlorhexidine 0.12% Liquid 15 milliLiter(s) Swish and Spit two times a day  dexmedetomidine Infusion 0.499 MICROgram(s)/kG/Hr (8 mL/Hr) IV Continuous <Continuous>  docusate sodium 100 milliGRAM(s) Oral three times a day  heparin  Injectable (Preservative-Free) 5000 Unit(s) SubCutaneous every 8 hours  influenza   Vaccine 0.5 milliLiter(s) IntraMuscular once  levothyroxine 75 MICROGram(s) Oral daily  midodrine 10 milliGRAM(s) Oral every 8 hours  norepinephrine Infusion 0.208 MICROgram(s)/kG/Min (25 mL/Hr) IV Continuous <Continuous>  nystatin Powder 1 Application(s) Topical every 12 hours  nystatin/triamcinolone Ointment 1 Application(s) Topical every 12 hours  pantoprazole  Injectable 40 milliGRAM(s) IV Push every 24 hours  senna 1 Tablet(s) Oral every 12 hours    MEDICATIONS  (PRN):  acetaminophen  Suppository 650 milliGRAM(s) Rectal every 4 hours PRN For Temp greater than 38 C (100.4 F)

## 2018-02-09 NOTE — PROGRESS NOTE ADULT - SUBJECTIVE AND OBJECTIVE BOX
58 Y/O FEMALE RESP FAILURE  S/P TRACHEOSTOMY, S/P PEG , SEEN AND  EXAMINED , IN BED, ON LEVOPHED, VENTED ON 40%, 450, 14, 5,     VS  98.7, 114/64,74, 14, 100%  HEENT : TRACH IN PLACE , NO ACTIVE  BLEEDING  LUNGS: GOOD AIR ENTRY BILATERALLY  ABD +BS  SOFT-PEG IN PLACE  NO ACTIVE BLEEDING   A&P : S/P TRACH , S/P PEG   VENTED  NO COMPLICATIONS     WILL FOLLOW

## 2018-02-09 NOTE — PROGRESS NOTE ADULT - ASSESSMENT
58 yo F being treated for aspiration PNA. Unable to tolerated SBT, pressor weaning. Midodrine started. Possible PEG/trach today.

## 2018-02-09 NOTE — PROGRESS NOTE ADULT - SUBJECTIVE AND OBJECTIVE BOX
[id number]    Patient is a 57y old female presents with a chief complaint of hypoxia at NH 2/2 aspiration PNA.  Pt has been unable to tolerate levophed weaning and SBT. Midodrine started yesterday, dose increased today. Surgery consulted for trach and peg, possibly for today. Brother Thomas Shah is decision maker.    PAST MEDICAL & SURGICAL HISTORY:      MEDICATIONS  (STANDING):  chlorhexidine 0.12% Liquid 15 milliLiter(s) Swish and Spit two times a day  dexmedetomidine Infusion 0.499 MICROgram(s)/kG/Hr (8 mL/Hr) IV Continuous <Continuous>  docusate sodium 100 milliGRAM(s) Oral three times a day  heparin  Injectable (Preservative-Free) 5000 Unit(s) SubCutaneous every 8 hours  influenza   Vaccine 0.5 milliLiter(s) IntraMuscular once  levothyroxine 75 MICROGram(s) Oral daily  midodrine 10 milliGRAM(s) Oral every 8 hours  norepinephrine Infusion 0.208 MICROgram(s)/kG/Min (25 mL/Hr) IV Continuous <Continuous>  nystatin Powder 1 Application(s) Topical every 12 hours  nystatin/triamcinolone Ointment 1 Application(s) Topical every 12 hours  pantoprazole  Injectable 40 milliGRAM(s) IV Push every 24 hours  senna 1 Tablet(s) Oral every 12 hours    MEDICATIONS  (PRN):  acetaminophen  Suppository 650 milliGRAM(s) Rectal every 4 hours PRN For Temp greater than 38 C (100.4 F)    No Known Allergies      Overnight events:    Vital Signs Last 24 Hrs  T(C): 36.2 (09 Feb 2018 07:01), Max: 37.3 (09 Feb 2018 02:00)  T(F): 97.2 (09 Feb 2018 07:01), Max: 99.2 (09 Feb 2018 02:00)  HR: 44 (09 Feb 2018 07:17) (42 - 70)  BP: 107/53 (09 Feb 2018 07:17) (90/43 - 109/54)  BP(mean): 77 (09 Feb 2018 07:17) (62 - 77)  RR: 17 (09 Feb 2018 07:17) (16 - 51)  SpO2: 98% (09 Feb 2018 07:17) (95% - 100%)  CAPILLARY BLOOD GLUCOSE        I&O's Summary    08 Feb 2018 07:01  -  09 Feb 2018 07:00  --------------------------------------------------------  IN: 1622 mL / OUT: 1705 mL / NET: -83 mL    09 Feb 2018 07:01  -  09 Feb 2018 07:34  --------------------------------------------------------  IN: 0 mL / OUT: 40 mL / NET: -40 mL          PHYSICAL EXAM:  GENERAL: NAD, intubated  HEAD:  Atraumatic, Normocephalic  EYES: EOMI, PERRLA, conjunctiva and sclera clear  NECK: Supple, No JVD  CHEST/LUNG: Rhonchi  HEART: Regular rate and rhythm; No murmurs;   ABDOMEN: Soft, Nontender, Nondistended; Bowel sounds present; No guarding  EXTREMITIES:  2+ Peripheral Pulses, No cyanosis or edema  SKIN: No rashes or lesions, Left IJ shows no evidence of infection    Labs:                        10.6   8.27  )-----------( 261      ( 09 Feb 2018 06:05 )             32.2             02-09    138  |  103  |  14  ----------------------------<  125<H>  3.8   |  30  |  0.6<L>    Ca    8.2<L>      09 Feb 2018 06:03  Mg     2.0     02-09    TPro  5.6<L>  /  Alb  2.0<L>  /  TBili  0.5  /  DBili  x   /  AST  33  /  ALT  34  /  AlkPhos  69  02-09    LIVER FUNCTIONS - ( 09 Feb 2018 06:03 )  Alb: 2.0 g/dL / Pro: 5.6 g/dL / ALK PHOS: 69 U/L / ALT: 34 U/L / AST: 33 U/L / GGT: x                 PT/INR - ( 09 Feb 2018 06:05 )   PT: 12.50 sec;   INR: 1.15 ratio         PTT - ( 09 Feb 2018 06:05 )  PTT:54.6 sec      ABG - ( 09 Feb 2018 06:58 )  pH: 7.51  /  pCO2: 38    /  pO2: 91    / HCO3: 30    / Base Excess: 6.6   /  SaO2: 97          Mode: Auto Mode: PRVC/ Volume Support  RR (machine): 16  TV (machine): 450  FiO2: 40  PEEP: 5  MAP: 11  PIP: 31  Lipline 20

## 2018-02-09 NOTE — PROGRESS NOTE ADULT - ASSESSMENT
Assesment and Plan     1.CNS  > Precedex for sedation - wide awake, comfortable. Stop precedex. Prn ativan iv pushes as needed      2. CVS -   >Septic shock - resolved   - Keep Levophed for MAP > 65 - wean today   - Increase Midodrine to 10 tid   - Possible dc TLC today     3. PULMONARY   >Aspiration pneumonia recurrent   - s/p antibiotics   - SBT failed multiple times   - Vent bundle - hob > 30, Aspiration precautions, Chlorhexidine mouth wash  - Surg consult for trach and PEG - possible today   - ETT 1 cm from jonh but lip line is 20 - would keep the same     4. INFECTIOUS DISEASE   As above     5. GI  PPI  Tube feeds     6. RENAL   none    7. Endocrine   > Hypothyroid - on synthyroid     8. Hematology   none     9. DVT AND GI PROPHYLAXIS  - heparin     10. Dispo - ICU     11. Code Status - Full code

## 2018-02-10 DIAGNOSIS — R13.10 DYSPHAGIA, UNSPECIFIED: ICD-10-CM

## 2018-02-10 LAB
ALBUMIN SERPL ELPH-MCNC: 2.1 G/DL — LOW (ref 3–5.5)
ALP SERPL-CCNC: 79 U/L — SIGNIFICANT CHANGE UP (ref 30–115)
ALT FLD-CCNC: 35 U/L — SIGNIFICANT CHANGE UP (ref 0–41)
ANION GAP SERPL CALC-SCNC: 7 MMOL/L — SIGNIFICANT CHANGE UP (ref 7–14)
AST SERPL-CCNC: 38 U/L — SIGNIFICANT CHANGE UP (ref 0–41)
BASE EXCESS BLDA CALC-SCNC: 3.9 MMOL/L — HIGH (ref -2–2)
BASOPHILS # BLD AUTO: 0.08 K/UL — SIGNIFICANT CHANGE UP (ref 0–0.2)
BASOPHILS NFR BLD AUTO: 1 % — SIGNIFICANT CHANGE UP (ref 0–1)
BILIRUB SERPL-MCNC: 0.9 MG/DL — SIGNIFICANT CHANGE UP (ref 0.2–1.2)
BUN SERPL-MCNC: 12 MG/DL — SIGNIFICANT CHANGE UP (ref 10–20)
CALCIUM SERPL-MCNC: 8.4 MG/DL — LOW (ref 8.5–10.1)
CHLORIDE SERPL-SCNC: 104 MMOL/L — SIGNIFICANT CHANGE UP (ref 98–110)
CO2 SERPL-SCNC: 26 MMOL/L — SIGNIFICANT CHANGE UP (ref 17–32)
CREAT SERPL-MCNC: 0.8 MG/DL — SIGNIFICANT CHANGE UP (ref 0.7–1.5)
EOSINOPHIL # BLD AUTO: 0.03 K/UL — SIGNIFICANT CHANGE UP (ref 0–0.7)
EOSINOPHIL NFR BLD AUTO: 0.4 % — SIGNIFICANT CHANGE UP (ref 0–8)
GLUCOSE SERPL-MCNC: 102 MG/DL — SIGNIFICANT CHANGE UP (ref 70–110)
HCO3 BLDA-SCNC: 28 MMOL/L — HIGH (ref 23–27)
HCT VFR BLD CALC: 32.4 % — LOW (ref 37–47)
HGB BLD-MCNC: 10.5 G/DL — LOW (ref 14–18)
HOROWITZ INDEX BLDA+IHG-RTO: 40 — SIGNIFICANT CHANGE UP
IMM GRANULOCYTES NFR BLD AUTO: 1.6 % — HIGH (ref 0.1–0.3)
LYMPHOCYTES # BLD AUTO: 1.57 K/UL — SIGNIFICANT CHANGE UP (ref 1.2–3.4)
LYMPHOCYTES # BLD AUTO: 19.1 % — LOW (ref 20.5–51.1)
MAGNESIUM SERPL-MCNC: 2 MG/DL — SIGNIFICANT CHANGE UP (ref 1.8–2.4)
MCHC RBC-ENTMCNC: 32.4 G/DL — SIGNIFICANT CHANGE UP (ref 32–37)
MCHC RBC-ENTMCNC: 32.6 PG — HIGH (ref 27–31)
MCV RBC AUTO: 100.6 FL — HIGH (ref 81–91)
MONOCYTES # BLD AUTO: 0.54 K/UL — SIGNIFICANT CHANGE UP (ref 0.1–0.6)
MONOCYTES NFR BLD AUTO: 6.6 % — SIGNIFICANT CHANGE UP (ref 1.7–9.3)
NEUTROPHILS # BLD AUTO: 5.86 K/UL — SIGNIFICANT CHANGE UP (ref 1.4–6.5)
NEUTROPHILS NFR BLD AUTO: 71.3 % — SIGNIFICANT CHANGE UP (ref 42.2–75.2)
PCO2 BLDA: 38 MMHG — SIGNIFICANT CHANGE UP (ref 38–42)
PH BLDA: 7.47 — HIGH (ref 7.38–7.42)
PLATELET # BLD AUTO: 279 K/UL — SIGNIFICANT CHANGE UP (ref 130–400)
PO2 BLDA: 114 MMHG — HIGH (ref 78–95)
POTASSIUM SERPL-MCNC: 4.3 MMOL/L — SIGNIFICANT CHANGE UP (ref 3.5–5)
POTASSIUM SERPL-SCNC: 4.3 MMOL/L — SIGNIFICANT CHANGE UP (ref 3.5–5)
PROT SERPL-MCNC: 5.7 G/DL — LOW (ref 6–8)
RBC # BLD: 3.22 M/UL — LOW (ref 4.2–5.4)
RBC # FLD: 17 % — HIGH (ref 11.5–14.5)
SAO2 % BLDA: 98 % — SIGNIFICANT CHANGE UP (ref 94–98)
SODIUM SERPL-SCNC: 137 MMOL/L — SIGNIFICANT CHANGE UP (ref 135–146)
WBC # BLD: 8.21 K/UL — SIGNIFICANT CHANGE UP (ref 4.8–10.8)
WBC # FLD AUTO: 8.21 K/UL — SIGNIFICANT CHANGE UP (ref 4.8–10.8)

## 2018-02-10 RX ORDER — MORPHINE SULFATE 50 MG/1
2 CAPSULE, EXTENDED RELEASE ORAL ONCE
Qty: 0 | Refills: 0 | Status: DISCONTINUED | OUTPATIENT
Start: 2018-02-10 | End: 2018-02-10

## 2018-02-10 RX ADMIN — CHLORHEXIDINE GLUCONATE 15 MILLILITER(S): 213 SOLUTION TOPICAL at 05:15

## 2018-02-10 RX ADMIN — SENNA PLUS 1 TABLET(S): 8.6 TABLET ORAL at 17:00

## 2018-02-10 RX ADMIN — PANTOPRAZOLE SODIUM 40 MILLIGRAM(S): 20 TABLET, DELAYED RELEASE ORAL at 14:46

## 2018-02-10 RX ADMIN — Medication 25 MICROGRAM(S)/KG/MIN: at 04:07

## 2018-02-10 RX ADMIN — Medication 100 MILLIGRAM(S): at 14:46

## 2018-02-10 RX ADMIN — CHLORHEXIDINE GLUCONATE 15 MILLILITER(S): 213 SOLUTION TOPICAL at 17:18

## 2018-02-10 RX ADMIN — MIDODRINE HYDROCHLORIDE 10 MILLIGRAM(S): 2.5 TABLET ORAL at 12:52

## 2018-02-10 RX ADMIN — Medication 1 APPLICATION(S): at 07:20

## 2018-02-10 RX ADMIN — Medication 25 MICROGRAM(S)/KG/MIN: at 08:19

## 2018-02-10 RX ADMIN — Medication 100 MILLIGRAM(S): at 21:42

## 2018-02-10 RX ADMIN — MORPHINE SULFATE 2 MILLIGRAM(S): 50 CAPSULE, EXTENDED RELEASE ORAL at 07:48

## 2018-02-10 RX ADMIN — MIDODRINE HYDROCHLORIDE 10 MILLIGRAM(S): 2.5 TABLET ORAL at 17:03

## 2018-02-10 RX ADMIN — NYSTATIN CREAM 1 APPLICATION(S): 100000 CREAM TOPICAL at 17:00

## 2018-02-10 RX ADMIN — NYSTATIN CREAM 1 APPLICATION(S): 100000 CREAM TOPICAL at 05:15

## 2018-02-10 RX ADMIN — Medication 25 MICROGRAM(S)/KG/MIN: at 18:01

## 2018-02-10 RX ADMIN — HEPARIN SODIUM 5000 UNIT(S): 5000 INJECTION INTRAVENOUS; SUBCUTANEOUS at 21:42

## 2018-02-10 RX ADMIN — Medication 1 APPLICATION(S): at 17:02

## 2018-02-10 RX ADMIN — MORPHINE SULFATE 2 MILLIGRAM(S): 50 CAPSULE, EXTENDED RELEASE ORAL at 08:00

## 2018-02-10 RX ADMIN — Medication 650 MILLIGRAM(S): at 03:15

## 2018-02-10 RX ADMIN — HEPARIN SODIUM 5000 UNIT(S): 5000 INJECTION INTRAVENOUS; SUBCUTANEOUS at 14:54

## 2018-02-10 NOTE — BRIEF OPERATIVE NOTE - OPERATION/FINDINGS
trachea identified. tracheostomy placed end tidal received. trachea identified. tracheostomy #8 Shiley placed end tidal CO2 obtained and confirmed.

## 2018-02-10 NOTE — BRIEF OPERATIVE NOTE - PROCEDURE
<<-----Click on this checkbox to enter Procedure PEG (percutaneous endoscopic gastrostomy)  02/10/2018    Active  [id number]

## 2018-02-10 NOTE — PROGRESS NOTE ADULT - SUBJECTIVE AND OBJECTIVE BOX
GEORGETTE CERDA  57y  [facility] 1      Patient is a 57y old  Female who presents with a chief complaint of     INTERVAL HPI/OVERNIGHT EVENTS:    no events overnight     Unable to review systems   T(C): 37.8 (02-10-18 @ 08:17), Max: 38.2 (02-10-18 @ 02:00)  HR: 70 (02-10-18 @ 06:00) (47 - 112)  BP: 107/55 (02-10-18 @ 06:00) (80/49 - 141/70)  RR: 22 (02-10-18 @ 07:27) (14 - 36)  SpO2: 100% (02-10-18 @ 06:00) (96% - 100%)  Wt(kg): --Vital Signs Last 24 Hrs  T(C): 37.8 (10 Feb 2018 08:17), Max: 38.2 (10 Feb 2018 02:00)  T(F): 100.1 (10 Feb 2018 08:17), Max: 100.7 (10 Feb 2018 02:00)  HR: 70 (10 Feb 2018 06:00) (47 - 112)  BP: 107/55 (10 Feb 2018 06:00) (80/49 - 141/70)  BP(mean): 78 (10 Feb 2018 06:00) (60 - 92)  RR: 22 (10 Feb 2018 07:27) (14 - 36)  SpO2: 100% (10 Feb 2018 06:00) (96% - 100%)    PHYSICAL EXAM:  GENERAL: NAD, well-groomed, well-developed  HEAD:  Atraumatic, Normocephalic  EYES: EOMI, PERRLA, conjunctiva and sclera clear  ENMT: No tonsillar erythema, exudates, or enlargement; Moist mucous membranes, Good dentition, No lesions  NECK: Supple, No JVD, Normal thyroid  NERVOUS SYSTEM:  Alert , Good concentration; Motor Strength 5/5 B/L upper and lower extremities; DTRs 2+ intact and symmetric  CHEST/LUNG: + bilateral air entry   HEART: Regular rate and rhythm; No murmurs, rubs, or gallops  ABDOMEN: Soft, Nontender, Nondistended; Bowel sounds present + peg   EXTREMITIES:  2+ Peripheral Pulses, trace lower extremity edema   LYMPH: No lymphadenopathy noted  SKIN: No rashes or lesions    Consultant(s) Notes Reviewed:  [x ] YES  [ ] NO  Care Discussed with Consultants/Other Providers [ x] YES  [ ] NO    LABS:                            10.5   8.21  )-----------( 279      ( 10 Feb 2018 06:26 )             32.4   02-10    137  |  104  |  12  ----------------------------<  102  4.3   |  26  |  0.8    Ca    8.4<L>      10 Feb 2018 06:26  Mg     2.0     02-10    TPro  5.7<L>  /  Alb  2.1<L>  /  TBili  0.9  /  DBili  x   /  AST  38  /  ALT  35  /  AlkPhos  79  02-10            acetaminophen  Suppository 650 milliGRAM(s) Rectal every 4 hours PRN  chlorhexidine 0.12% Liquid 15 milliLiter(s) Swish and Spit two times a day  dexmedetomidine Infusion 0.5 MICROgram(s)/kG/Hr IV Continuous <Continuous>  docusate sodium 100 milliGRAM(s) Oral three times a day  heparin  Injectable (Preservative-Free) 5000 Unit(s) SubCutaneous every 8 hours  influenza   Vaccine 0.5 milliLiter(s) IntraMuscular once  levothyroxine 75 MICROGram(s) Oral daily  LORazepam   Injectable 1 milliGRAM(s) IV Push every 6 hours PRN  midodrine 10 milliGRAM(s) Oral every 8 hours  norepinephrine Infusion 0.208 MICROgram(s)/kG/Min IV Continuous <Continuous>  nystatin Powder 1 Application(s) Topical every 12 hours  nystatin/triamcinolone Ointment 1 Application(s) Topical every 12 hours  pantoprazole  Injectable 40 milliGRAM(s) IV Push every 24 hours  senna 1 Tablet(s) Oral every 12 hours      HEALTH ISSUES - PROBLEM Dx:  Hypotension, unspecified hypotension type: Hypotension, unspecified hypotension type  Sepsis, due to unspecified organism: Sepsis, due to unspecified organism  Aspiration pneumonia, unspecified aspiration pneumonia type, unspecified laterality, unspecified part of lung: Aspiration pneumonia, unspecified aspiration pneumonia type, unspecified laterality, unspecified part of lung  Acute respiratory failure with hypoxia: Acute respiratory failure with hypoxia  Hypothyroidism, unspecified type: Hypothyroidism, unspecified type  Sepsis: Sepsis  Hypothyroid: Hypothyroid  Pneumonia: Pneumonia  Respiratory failure: Respiratory failure          Case Discussed with House Staff   Time Spent  40 minutes

## 2018-02-10 NOTE — PROGRESS NOTE ADULT - SUBJECTIVE AND OBJECTIVE BOX
STATUS POST:    trach and peg       Vital Signs Last 24 Hrs  T(C): 37.8 (10 Feb 2018 08:17), Max: 38.2 (10 Feb 2018 02:00)  T(F): 100.1 (10 Feb 2018 08:17), Max: 100.7 (10 Feb 2018 02:00)  HR: 70 (10 Feb 2018 06:00) (47 - 112)  BP: 107/55 (10 Feb 2018 06:00) (80/49 - 141/70)  BP(mean): 78 (10 Feb 2018 06:00) (60 - 92)  RR: 22 (10 Feb 2018 07:27) (14 - 36)  SpO2: 100% (10 Feb 2018 06:00) (96% - 100%)      SUBJECTIVE: Pt seen and examined       General Appearance: Appears well, NAD  Alert  Chest: Equal expansion bilaterally, equal breath sounds  CV: Pulse regular presently  Abdomen: Soft, nontender,  Not distended, no guarding, not rigid   Extremities: Grossly symmetric, SCD's in place     I&O's Summary    09 Feb 2018 07:01  -  10 Feb 2018 07:00  --------------------------------------------------------  IN: 395 mL / OUT: 1555 mL / NET: -1160 mL    10 Feb 2018 07:01  -  10 Feb 2018 08:56  --------------------------------------------------------  IN: 0 mL / OUT: 70 mL / NET: -70 mL      I&O's Detail    09 Feb 2018 07:01  -  10 Feb 2018 07:00  --------------------------------------------------------  IN:    dexmedetomidine Infusion: 10 mL    Enteral Tube Flush: 30 mL    norepinephrine Infusion: 315 mL    Osmolite: 40 mL  Total IN: 395 mL    OUT:    Indwelling Catheter - Urethral: 1555 mL  Total OUT: 1555 mL    Total NET: -1160 mL      10 Feb 2018 07:01  -  10 Feb 2018 08:56  --------------------------------------------------------  IN:  Total IN: 0 mL    OUT:    Indwelling Catheter - Urethral: 70 mL  Total OUT: 70 mL    Total NET: -70 mL          LABS:                        10.5   8.21  )-----------( 279      ( 10 Feb 2018 06:26 )             32.4     02-10    137  |  104  |  12  ----------------------------<  102  4.3   |  26  |  0.8    Ca    8.4<L>      10 Feb 2018 06:26  Mg     2.0     02-10    TPro  5.7<L>  /  Alb  2.1<L>  /  TBili  0.9  /  DBili  x   /  AST  38  /  ALT  35  /  AlkPhos  79  02-10    PT/INR - ( 09 Feb 2018 06:05 )   PT: 12.50 sec;   INR: 1.15 ratio         PTT - ( 09 Feb 2018 06:05 )  PTT:54.6 sec      Images:     MEDICATIONS  (STANDING):  chlorhexidine 0.12% Liquid 15 milliLiter(s) Swish and Spit two times a day  dexmedetomidine Infusion 0.5 MICROgram(s)/kG/Hr (8 mL/Hr) IV Continuous <Continuous>  docusate sodium 100 milliGRAM(s) Oral three times a day  heparin  Injectable (Preservative-Free) 5000 Unit(s) SubCutaneous every 8 hours  influenza   Vaccine 0.5 milliLiter(s) IntraMuscular once  levothyroxine 75 MICROGram(s) Oral daily  midodrine 10 milliGRAM(s) Oral every 8 hours  norepinephrine Infusion 0.208 MICROgram(s)/kG/Min (25 mL/Hr) IV Continuous <Continuous>  nystatin Powder 1 Application(s) Topical every 12 hours  nystatin/triamcinolone Ointment 1 Application(s) Topical every 12 hours  pantoprazole  Injectable 40 milliGRAM(s) IV Push every 24 hours  senna 1 Tablet(s) Oral every 12 hours    MEDICATIONS  (PRN):  acetaminophen  Suppository 650 milliGRAM(s) Rectal every 4 hours PRN For Temp greater than 38 C (100.4 F)  LORazepam   Injectable 1 milliGRAM(s) IV Push every 6 hours PRN Anxiety    a/p  trach in good position no further intervention  peg can start feeds at 10 ml/hr      A/P:

## 2018-02-10 NOTE — PROGRESS NOTE ADULT - SUBJECTIVE AND OBJECTIVE BOX
Over Night Events:  Patient seen and examined s/p peg tube placement to acute events       ROS:  See HPI    PHYSICAL EXAM    ICU Vital Signs Last 24 Hrs  T(C): 38.2 (10 Feb 2018 02:01), Max: 38.2 (10 Feb 2018 02:00)  T(F): 100.7 (10 Feb 2018 02:01), Max: 100.7 (10 Feb 2018 02:00)  HR: 70 (10 Feb 2018 06:00) (44 - 112)  BP: 107/55 (10 Feb 2018 06:00) (80/49 - 141/70)  BP(mean): 78 (10 Feb 2018 06:00) (60 - 92)  ABP: --  ABP(mean): --  RR: 15 (10 Feb 2018 06:00) (14 - 36)  SpO2: 100% (10 Feb 2018 06:00) (96% - 100%)      General:  HEENT:                Lymph Nodes: NO cervical LN   Lungs: Bilateral BS  Cardiovascular: Regular   Abdomen: Soft, Positive BS  Extremities: No clubbing   Skin:   Neurological:       LABS:                          10.6   8.27  )-----------( 261      ( 09 Feb 2018 06:05 )             32.2         09 Feb 2018 06:03    138    |  103    |  14     ----------------------------<  125    3.8     |  30     |  0.6      Ca    8.2        09 Feb 2018 06:03  Mg     2.0       09 Feb 2018 06:03                                               PT/INR - ( 09 Feb 2018 06:05 )   PT: 12.50 sec;   INR: 1.15 ratio         PTT - ( 09 Feb 2018 06:05 )  PTT:54.6 sec                                                                                                                                                Mode: Auto Mode: PRVC/ Volume Support  RR (machine): 16  TV (machine): 450  FiO2: 50  PEEP: 5  MAP: 8  PIP: 16                                      ABG - ( 10 Feb 2018 06:19 )  pH: 7.47  /  pCO2: 38    /  pO2: 114   / HCO3: 28    / Base Excess: 3.9   /  SaO2: 98                  MEDICATIONS  (STANDING):  chlorhexidine 0.12% Liquid 15 milliLiter(s) Swish and Spit two times a day  dexmedetomidine Infusion 0.5 MICROgram(s)/kG/Hr (8 mL/Hr) IV Continuous <Continuous>  docusate sodium 100 milliGRAM(s) Oral three times a day  heparin  Injectable (Preservative-Free) 5000 Unit(s) SubCutaneous every 8 hours  influenza   Vaccine 0.5 milliLiter(s) IntraMuscular once  levothyroxine 75 MICROGram(s) Oral daily  midodrine 10 milliGRAM(s) Oral every 8 hours  morphine  - Injectable 2 milliGRAM(s) IV Push once  norepinephrine Infusion 0.208 MICROgram(s)/kG/Min (25 mL/Hr) IV Continuous <Continuous>  nystatin Powder 1 Application(s) Topical every 12 hours  nystatin/triamcinolone Ointment 1 Application(s) Topical every 12 hours  pantoprazole  Injectable 40 milliGRAM(s) IV Push every 24 hours  senna 1 Tablet(s) Oral every 12 hours    MEDICATIONS  (PRN):  acetaminophen  Suppository 650 milliGRAM(s) Rectal every 4 hours PRN For Temp greater than 38 C (100.4 F)  LORazepam   Injectable 1 milliGRAM(s) IV Push every 6 hours PRN Anxiety        Xrays:  TLC: ok  OG:  tracheostomy tube in place                                                                                     ECHO:    IMPRESSION:  1-ARF  2-Asp pneumonia   3-septic shock resolved      PLAN:    CNS: sedation as needed     HEENT:  oral care   PULMONARY: keep same vent managment   Asp precaution   CARDIOVASCULAR: keep Is =OS     GI: GI prophylaxis. follow up with G sx if can use the peg tube for feeding     RENAL: follow lytes     INFECTIOUS DISEASE:s/p ABX     HEMATOLOGICAL:  DVT prophylaxis.    ENDOCRINE:  Follow up FS.  Insulin protocol if needed.    MUSCULOSKELETAL:

## 2018-02-11 LAB
ANION GAP SERPL CALC-SCNC: 6 MMOL/L — LOW (ref 7–14)
BASE EXCESS BLDA CALC-SCNC: 4.4 MMOL/L — HIGH (ref -2–2)
BASOPHILS # BLD AUTO: 0.08 K/UL — SIGNIFICANT CHANGE UP (ref 0–0.2)
BASOPHILS NFR BLD AUTO: 1 % — SIGNIFICANT CHANGE UP (ref 0–1)
BUN SERPL-MCNC: 11 MG/DL — SIGNIFICANT CHANGE UP (ref 10–20)
CALCIUM SERPL-MCNC: 8.3 MG/DL — LOW (ref 8.5–10.1)
CHLORIDE SERPL-SCNC: 106 MMOL/L — SIGNIFICANT CHANGE UP (ref 98–110)
CO2 SERPL-SCNC: 27 MMOL/L — SIGNIFICANT CHANGE UP (ref 17–32)
CREAT SERPL-MCNC: 0.7 MG/DL — SIGNIFICANT CHANGE UP (ref 0.7–1.5)
EOSINOPHIL # BLD AUTO: 0.19 K/UL — SIGNIFICANT CHANGE UP (ref 0–0.7)
EOSINOPHIL NFR BLD AUTO: 2.3 % — SIGNIFICANT CHANGE UP (ref 0–8)
GLUCOSE SERPL-MCNC: 145 MG/DL — HIGH (ref 70–110)
HCO3 BLDA-SCNC: 28 MMOL/L — HIGH (ref 23–27)
HCT VFR BLD CALC: 31.7 % — LOW (ref 37–47)
HGB BLD-MCNC: 10.1 G/DL — LOW (ref 14–18)
HOROWITZ INDEX BLDA+IHG-RTO: 40 — SIGNIFICANT CHANGE UP
IMM GRANULOCYTES NFR BLD AUTO: 2 % — HIGH (ref 0.1–0.3)
LYMPHOCYTES # BLD AUTO: 1.75 K/UL — SIGNIFICANT CHANGE UP (ref 1.2–3.4)
LYMPHOCYTES # BLD AUTO: 21.5 % — SIGNIFICANT CHANGE UP (ref 20.5–51.1)
MAGNESIUM SERPL-MCNC: 1.9 MG/DL — SIGNIFICANT CHANGE UP (ref 1.8–2.4)
MCHC RBC-ENTMCNC: 31.9 G/DL — LOW (ref 32–37)
MCHC RBC-ENTMCNC: 32.2 PG — HIGH (ref 27–31)
MCV RBC AUTO: 101 FL — HIGH (ref 81–91)
MONOCYTES # BLD AUTO: 0.79 K/UL — HIGH (ref 0.1–0.6)
MONOCYTES NFR BLD AUTO: 9.7 % — HIGH (ref 1.7–9.3)
NEUTROPHILS # BLD AUTO: 5.16 K/UL — SIGNIFICANT CHANGE UP (ref 1.4–6.5)
NEUTROPHILS NFR BLD AUTO: 63.5 % — SIGNIFICANT CHANGE UP (ref 42.2–75.2)
PCO2 BLDA: 40 MMHG — SIGNIFICANT CHANGE UP (ref 38–42)
PH BLDA: 7.46 — HIGH (ref 7.38–7.42)
PLATELET # BLD AUTO: 276 K/UL — SIGNIFICANT CHANGE UP (ref 130–400)
PO2 BLDA: 131 MMHG — HIGH (ref 78–95)
POTASSIUM SERPL-MCNC: 3.6 MMOL/L — SIGNIFICANT CHANGE UP (ref 3.5–5)
POTASSIUM SERPL-SCNC: 3.6 MMOL/L — SIGNIFICANT CHANGE UP (ref 3.5–5)
RBC # BLD: 3.14 M/UL — LOW (ref 4.2–5.4)
RBC # FLD: 17.1 % — HIGH (ref 11.5–14.5)
SAO2 % BLDA: 99 % — HIGH (ref 94–98)
SODIUM SERPL-SCNC: 139 MMOL/L — SIGNIFICANT CHANGE UP (ref 135–146)
WBC # BLD: 8.13 K/UL — SIGNIFICANT CHANGE UP (ref 4.8–10.8)
WBC # FLD AUTO: 8.13 K/UL — SIGNIFICANT CHANGE UP (ref 4.8–10.8)

## 2018-02-11 RX ADMIN — CHLORHEXIDINE GLUCONATE 15 MILLILITER(S): 213 SOLUTION TOPICAL at 05:15

## 2018-02-11 RX ADMIN — MIDODRINE HYDROCHLORIDE 10 MILLIGRAM(S): 2.5 TABLET ORAL at 11:21

## 2018-02-11 RX ADMIN — Medication 25 MICROGRAM(S)/KG/MIN: at 09:54

## 2018-02-11 RX ADMIN — HEPARIN SODIUM 5000 UNIT(S): 5000 INJECTION INTRAVENOUS; SUBCUTANEOUS at 14:10

## 2018-02-11 RX ADMIN — SENNA PLUS 1 TABLET(S): 8.6 TABLET ORAL at 05:15

## 2018-02-11 RX ADMIN — MIDODRINE HYDROCHLORIDE 10 MILLIGRAM(S): 2.5 TABLET ORAL at 17:46

## 2018-02-11 RX ADMIN — MIDODRINE HYDROCHLORIDE 10 MILLIGRAM(S): 2.5 TABLET ORAL at 05:15

## 2018-02-11 RX ADMIN — Medication 1 APPLICATION(S): at 05:16

## 2018-02-11 RX ADMIN — Medication 1 APPLICATION(S): at 17:47

## 2018-02-11 RX ADMIN — Medication 100 MILLIGRAM(S): at 05:15

## 2018-02-11 RX ADMIN — PANTOPRAZOLE SODIUM 40 MILLIGRAM(S): 20 TABLET, DELAYED RELEASE ORAL at 13:16

## 2018-02-11 RX ADMIN — SENNA PLUS 1 TABLET(S): 8.6 TABLET ORAL at 17:46

## 2018-02-11 RX ADMIN — Medication 1 MILLIGRAM(S): at 21:49

## 2018-02-11 RX ADMIN — Medication 100 MILLIGRAM(S): at 14:30

## 2018-02-11 RX ADMIN — HEPARIN SODIUM 5000 UNIT(S): 5000 INJECTION INTRAVENOUS; SUBCUTANEOUS at 05:16

## 2018-02-11 RX ADMIN — NYSTATIN CREAM 1 APPLICATION(S): 100000 CREAM TOPICAL at 05:16

## 2018-02-11 RX ADMIN — Medication 100 MILLIGRAM(S): at 21:06

## 2018-02-11 RX ADMIN — Medication 75 MICROGRAM(S): at 05:15

## 2018-02-11 RX ADMIN — HEPARIN SODIUM 5000 UNIT(S): 5000 INJECTION INTRAVENOUS; SUBCUTANEOUS at 21:05

## 2018-02-11 RX ADMIN — NYSTATIN CREAM 1 APPLICATION(S): 100000 CREAM TOPICAL at 17:47

## 2018-02-11 RX ADMIN — CHLORHEXIDINE GLUCONATE 15 MILLILITER(S): 213 SOLUTION TOPICAL at 17:43

## 2018-02-11 NOTE — PROGRESS NOTE ADULT - SUBJECTIVE AND OBJECTIVE BOX
[id number]    Patient is a 57y old female presents with a chief complaint of hypoxia at NH 2/2 aspiration PNA.  Pt has been unable to tolerate levophed weaning and SBT. Midodrine started, Levophed being weaned. Pt s/p trach/peg    MEDICATIONS  (STANDING):  chlorhexidine 0.12% Liquid 15 milliLiter(s) Swish and Spit two times a day  dexmedetomidine Infusion 0.5 MICROgram(s)/kG/Hr (8 mL/Hr) IV Continuous <Continuous>  docusate sodium 100 milliGRAM(s) Oral three times a day  heparin  Injectable (Preservative-Free) 5000 Unit(s) SubCutaneous every 8 hours  influenza   Vaccine 0.5 milliLiter(s) IntraMuscular once  levothyroxine 75 MICROGram(s) Oral daily  midodrine 10 milliGRAM(s) Oral every 8 hours  norepinephrine Infusion 0.208 MICROgram(s)/kG/Min (25 mL/Hr) IV Continuous <Continuous>  nystatin Powder 1 Application(s) Topical every 12 hours  nystatin/triamcinolone Ointment 1 Application(s) Topical every 12 hours  pantoprazole  Injectable 40 milliGRAM(s) IV Push every 24 hours  senna 1 Tablet(s) Oral every 12 hours    MEDICATIONS  (PRN):  acetaminophen  Suppository 650 milliGRAM(s) Rectal every 4 hours PRN For Temp greater than 38 C (100.4 F)  LORazepam   Injectable 1 milliGRAM(s) IV Push every 6 hours PRN Anxiety    No Known Allergies      Overnight events:    Vital Signs Last 24 Hrs  T(C): 37 (11 Feb 2018 07:39), Max: 37.8 (10 Feb 2018 23:10)  T(F): 98.6 (11 Feb 2018 07:39), Max: 100 (10 Feb 2018 23:10)  HR: 54 (11 Feb 2018 03:46) (54 - 98)  BP: 139/65 (11 Feb 2018 09:00) (98/53 - 139/65)  BP(mean): 94 (11 Feb 2018 09:00) (72 - 94)  RR: 14 (11 Feb 2018 07:39) (14 - 25)  SpO2: 99% (11 Feb 2018 05:00) (99% - 100%)  CAPILLARY BLOOD GLUCOSE        I&O's Summary    10 Feb 2018 07:01  -  11 Feb 2018 07:00  --------------------------------------------------------  IN: 994 mL / OUT: 1275 mL / NET: -281 mL    11 Feb 2018 07:01  -  11 Feb 2018 09:41  --------------------------------------------------------  IN: 0 mL / OUT: 75 mL / NET: -75 mL          PHYSICAL EXAM:  GENERAL: NAD, speaks in full sentences, no signs of respiratory distress  HEAD:  Atraumatic, Normocephalic  EYES: EOMI, PERRLA, conjunctiva and sclera clear  NECK: Supple, No JVD  CHEST/LUNG: Clear to auscultation bilaterally; No wheeze; No crackles; No accessory muscles used  HEART: Regular rate and rhythm; No murmurs;   ABDOMEN: Soft, Nontender, Nondistended; Bowel sounds present; No guarding  EXTREMITIES:  2+ Peripheral Pulses, No cyanosis or edema  PSYCH: AAOx3  NEUROLOGY: non-focal  SKIN: No rashes or lesions    Labs:                        10.1   8.13  )-----------( 276      ( 11 Feb 2018 06:21 )             31.7             02-11    139  |  106  |  11  ----------------------------<  145<H>  3.6   |  27  |  0.7    Ca    8.3<L>      11 Feb 2018 06:21  Mg     1.9     02-11    TPro  5.7<L>  /  Alb  2.1<L>  /  TBili  0.9  /  DBili  x   /  AST  38  /  ALT  35  /  AlkPhos  79  02-10    LIVER FUNCTIONS - ( 10 Feb 2018 06:26 )  Alb: 2.1 g/dL / Pro: 5.7 g/dL / ALK PHOS: 79 U/L / ALT: 35 U/L / AST: 38 U/L / GGT: x                       ABG - ( 11 Feb 2018 04:30 )  pH: 7.46  /  pCO2: 40    /  pO2: 131   / HCO3: 28    / Base Excess: 4.4   /  SaO2: 99          Mode: Auto Mode: PRVC/ Volume Support  RR (machine): 14  TV (machine): 450  FiO2: 30  PEEP: 5  MAP: 10  PIP: 27

## 2018-02-11 NOTE — PROGRESS NOTE ADULT - SUBJECTIVE AND OBJECTIVE BOX
Over Night Events:  Patient seen and examined no other changes, s/p peg tube , she spikes temp       ROS:  See HPI    PHYSICAL EXAM    ICU Vital Signs Last 24 Hrs  T(C): 37 (11 Feb 2018 07:39), Max: 37.8 (10 Feb 2018 23:10)  T(F): 98.6 (11 Feb 2018 07:39), Max: 100 (10 Feb 2018 23:10)  HR: 54 (11 Feb 2018 03:46) (54 - 98)  BP: 106/51 (11 Feb 2018 05:00) (98/53 - 122/76)  BP(mean): 73 (11 Feb 2018 05:00) (72 - 93)  ABP: --  ABP(mean): --  RR: 14 (11 Feb 2018 07:39) (14 - 25)  SpO2: 99% (11 Feb 2018 05:00) (99% - 100%)      General: Awake   HEENT:     tracheostomy            Lymph Nodes: NO cervical LN   Lungs: Bilateral BS  Cardiovascular: Regular   Abdomen: Soft, Positive BS  Extremities: No clubbing   Skin:   Neurological: move ext weak     I&O's Detail    10 Feb 2018 07:01  -  11 Feb 2018 07:00  --------------------------------------------------------  IN:    Enteral Tube Flush: 140 mL    norepinephrine Infusion: 434 mL    Osmolite: 420 mL  Total IN: 994 mL    OUT:    Indwelling Catheter - Urethral: 1275 mL  Total OUT: 1275 mL    Total NET: -281 mL      11 Feb 2018 07:01  -  11 Feb 2018 08:24  --------------------------------------------------------  IN:  Total IN: 0 mL    OUT:    Indwelling Catheter - Urethral: 15 mL  Total OUT: 15 mL    Total NET: -15 mL          LABS:                          10.1   8.13  )-----------( 276      ( 11 Feb 2018 06:21 )             31.7         11 Feb 2018 06:21    139    |  106    |  11     ----------------------------<  145    3.6     |  27     |  0.7      Ca    8.3        11 Feb 2018 06:21  Mg     1.9       11 Feb 2018 06:21                                                                                                                                                                                               Mode: Auto Mode: PRVC/ Volume Support  RR (machine): 14  TV (machine): 450  FiO2: 30  PEEP: 5  MAP: 10  PIP: 27                                      ABG - ( 11 Feb 2018 04:30 )  pH: 7.46  /  pCO2: 40    /  pO2: 131   / HCO3: 28    / Base Excess: 4.4   /  SaO2: 99                  MEDICATIONS  (STANDING):  chlorhexidine 0.12% Liquid 15 milliLiter(s) Swish and Spit two times a day  dexmedetomidine Infusion 0.5 MICROgram(s)/kG/Hr (8 mL/Hr) IV Continuous <Continuous>  docusate sodium 100 milliGRAM(s) Oral three times a day  heparin  Injectable (Preservative-Free) 5000 Unit(s) SubCutaneous every 8 hours  influenza   Vaccine 0.5 milliLiter(s) IntraMuscular once  levothyroxine 75 MICROGram(s) Oral daily  midodrine 10 milliGRAM(s) Oral every 8 hours  norepinephrine Infusion 0.208 MICROgram(s)/kG/Min (25 mL/Hr) IV Continuous <Continuous>  nystatin Powder 1 Application(s) Topical every 12 hours  nystatin/triamcinolone Ointment 1 Application(s) Topical every 12 hours  pantoprazole  Injectable 40 milliGRAM(s) IV Push every 24 hours  senna 1 Tablet(s) Oral every 12 hours    MEDICATIONS  (PRN):  acetaminophen  Suppository 650 milliGRAM(s) Rectal every 4 hours PRN For Temp greater than 38 C (100.4 F)  LORazepam   Injectable 1 milliGRAM(s) IV Push every 6 hours PRN Anxiety          Xrays:  TLC:  OG:  ET tube:                                                                                       ECHO:    IMPRESSION:  1-ARF  2-Asp pneumonia   3-septic shock resolved      PLAN:    CNS: sedation as needed     HEENT:  oral care   PULMONARY: keep same vent managment   Asp precaution   CARDIOVASCULAR: keep Is =OS     GI: GI prophylaxis. peg feeding     RENAL: follow lytes     INFECTIOUS DISEASE: s/p ABX d/c central line pan cx , if spike temp will start broad spectrum BX     HEMATOLOGICAL:  DVT prophylaxis.    ENDOCRINE:  Follow up FS.  Insulin protocol if needed.

## 2018-02-12 LAB
ALBUMIN SERPL ELPH-MCNC: 2.1 G/DL — LOW (ref 3–5.5)
ALP SERPL-CCNC: 64 U/L — SIGNIFICANT CHANGE UP (ref 30–115)
ALT FLD-CCNC: 28 U/L — SIGNIFICANT CHANGE UP (ref 0–41)
ANION GAP SERPL CALC-SCNC: 6 MMOL/L — LOW (ref 7–14)
AST SERPL-CCNC: 35 U/L — SIGNIFICANT CHANGE UP (ref 0–41)
BASOPHILS # BLD AUTO: 0.09 K/UL — SIGNIFICANT CHANGE UP (ref 0–0.2)
BASOPHILS NFR BLD AUTO: 1.1 % — HIGH (ref 0–1)
BILIRUB SERPL-MCNC: 0.5 MG/DL — SIGNIFICANT CHANGE UP (ref 0.2–1.2)
BUN SERPL-MCNC: 13 MG/DL — SIGNIFICANT CHANGE UP (ref 10–20)
CALCIUM SERPL-MCNC: 8.5 MG/DL — SIGNIFICANT CHANGE UP (ref 8.5–10.1)
CHLORIDE SERPL-SCNC: 109 MMOL/L — SIGNIFICANT CHANGE UP (ref 98–110)
CO2 SERPL-SCNC: 27 MMOL/L — SIGNIFICANT CHANGE UP (ref 17–32)
CREAT SERPL-MCNC: 0.6 MG/DL — LOW (ref 0.7–1.5)
EOSINOPHIL # BLD AUTO: 0.25 K/UL — SIGNIFICANT CHANGE UP (ref 0–0.7)
EOSINOPHIL NFR BLD AUTO: 3 % — SIGNIFICANT CHANGE UP (ref 0–8)
GLUCOSE SERPL-MCNC: 122 MG/DL — HIGH (ref 70–110)
HCT VFR BLD CALC: 31.6 % — LOW (ref 37–47)
HGB BLD-MCNC: 10.2 G/DL — LOW (ref 14–18)
IMM GRANULOCYTES NFR BLD AUTO: 1.7 % — HIGH (ref 0.1–0.3)
LYMPHOCYTES # BLD AUTO: 2 K/UL — SIGNIFICANT CHANGE UP (ref 1.2–3.4)
LYMPHOCYTES # BLD AUTO: 23.7 % — SIGNIFICANT CHANGE UP (ref 20.5–51.1)
MAGNESIUM SERPL-MCNC: 1.9 MG/DL — SIGNIFICANT CHANGE UP (ref 1.8–2.4)
MCHC RBC-ENTMCNC: 32.3 G/DL — SIGNIFICANT CHANGE UP (ref 32–37)
MCHC RBC-ENTMCNC: 32.6 PG — HIGH (ref 27–31)
MCV RBC AUTO: 101 FL — HIGH (ref 81–91)
MONOCYTES # BLD AUTO: 0.75 K/UL — HIGH (ref 0.1–0.6)
MONOCYTES NFR BLD AUTO: 8.9 % — SIGNIFICANT CHANGE UP (ref 1.7–9.3)
NEUTROPHILS # BLD AUTO: 5.21 K/UL — SIGNIFICANT CHANGE UP (ref 1.4–6.5)
NEUTROPHILS NFR BLD AUTO: 61.6 % — SIGNIFICANT CHANGE UP (ref 42.2–75.2)
PLATELET # BLD AUTO: 263 K/UL — SIGNIFICANT CHANGE UP (ref 130–400)
POTASSIUM SERPL-MCNC: 3.9 MMOL/L — SIGNIFICANT CHANGE UP (ref 3.5–5)
POTASSIUM SERPL-SCNC: 3.9 MMOL/L — SIGNIFICANT CHANGE UP (ref 3.5–5)
PROT SERPL-MCNC: 5.6 G/DL — LOW (ref 6–8)
RBC # BLD: 3.13 M/UL — LOW (ref 4.2–5.4)
RBC # FLD: 17.2 % — HIGH (ref 11.5–14.5)
SODIUM SERPL-SCNC: 142 MMOL/L — SIGNIFICANT CHANGE UP (ref 135–146)
WBC # BLD: 8.44 K/UL — SIGNIFICANT CHANGE UP (ref 4.8–10.8)
WBC # FLD AUTO: 8.44 K/UL — SIGNIFICANT CHANGE UP (ref 4.8–10.8)

## 2018-02-12 RX ORDER — SODIUM CHLORIDE 9 MG/ML
1000 INJECTION INTRAMUSCULAR; INTRAVENOUS; SUBCUTANEOUS
Qty: 0 | Refills: 0 | Status: DISCONTINUED | OUTPATIENT
Start: 2018-02-12 | End: 2018-02-18

## 2018-02-12 RX ORDER — SODIUM CHLORIDE 9 MG/ML
1000 INJECTION INTRAMUSCULAR; INTRAVENOUS; SUBCUTANEOUS ONCE
Qty: 0 | Refills: 0 | Status: COMPLETED | OUTPATIENT
Start: 2018-02-12 | End: 2018-02-12

## 2018-02-12 RX ORDER — SODIUM CHLORIDE 9 MG/ML
500 INJECTION INTRAMUSCULAR; INTRAVENOUS; SUBCUTANEOUS ONCE
Qty: 0 | Refills: 0 | Status: COMPLETED | OUTPATIENT
Start: 2018-02-12 | End: 2018-02-12

## 2018-02-12 RX ORDER — ACETAMINOPHEN 500 MG
650 TABLET ORAL EVERY 6 HOURS
Qty: 0 | Refills: 0 | Status: DISCONTINUED | OUTPATIENT
Start: 2018-02-12 | End: 2018-02-20

## 2018-02-12 RX ADMIN — Medication 75 MICROGRAM(S): at 05:09

## 2018-02-12 RX ADMIN — Medication 650 MILLIGRAM(S): at 17:14

## 2018-02-12 RX ADMIN — MIDODRINE HYDROCHLORIDE 10 MILLIGRAM(S): 2.5 TABLET ORAL at 17:08

## 2018-02-12 RX ADMIN — Medication 650 MILLIGRAM(S): at 10:12

## 2018-02-12 RX ADMIN — Medication 650 MILLIGRAM(S): at 10:22

## 2018-02-12 RX ADMIN — SENNA PLUS 1 TABLET(S): 8.6 TABLET ORAL at 05:10

## 2018-02-12 RX ADMIN — Medication 1 MILLIGRAM(S): at 09:56

## 2018-02-12 RX ADMIN — HEPARIN SODIUM 5000 UNIT(S): 5000 INJECTION INTRAVENOUS; SUBCUTANEOUS at 22:00

## 2018-02-12 RX ADMIN — Medication 100 MILLIGRAM(S): at 13:47

## 2018-02-12 RX ADMIN — HEPARIN SODIUM 5000 UNIT(S): 5000 INJECTION INTRAVENOUS; SUBCUTANEOUS at 05:10

## 2018-02-12 RX ADMIN — SODIUM CHLORIDE 75 MILLILITER(S): 9 INJECTION INTRAMUSCULAR; INTRAVENOUS; SUBCUTANEOUS at 16:35

## 2018-02-12 RX ADMIN — Medication 100 MILLIGRAM(S): at 05:09

## 2018-02-12 RX ADMIN — MIDODRINE HYDROCHLORIDE 10 MILLIGRAM(S): 2.5 TABLET ORAL at 05:10

## 2018-02-12 RX ADMIN — Medication 25 MICROGRAM(S)/KG/MIN: at 19:53

## 2018-02-12 RX ADMIN — Medication 1 APPLICATION(S): at 05:10

## 2018-02-12 RX ADMIN — HEPARIN SODIUM 5000 UNIT(S): 5000 INJECTION INTRAVENOUS; SUBCUTANEOUS at 13:52

## 2018-02-12 RX ADMIN — Medication 650 MILLIGRAM(S): at 17:11

## 2018-02-12 RX ADMIN — SODIUM CHLORIDE 1000 MILLILITER(S): 9 INJECTION INTRAMUSCULAR; INTRAVENOUS; SUBCUTANEOUS at 09:34

## 2018-02-12 RX ADMIN — Medication 25 MICROGRAM(S)/KG/MIN: at 10:50

## 2018-02-12 RX ADMIN — SODIUM CHLORIDE 500 MILLILITER(S): 9 INJECTION INTRAMUSCULAR; INTRAVENOUS; SUBCUTANEOUS at 02:00

## 2018-02-12 RX ADMIN — CHLORHEXIDINE GLUCONATE 15 MILLILITER(S): 213 SOLUTION TOPICAL at 17:08

## 2018-02-12 RX ADMIN — Medication 1 APPLICATION(S): at 17:13

## 2018-02-12 RX ADMIN — PANTOPRAZOLE SODIUM 40 MILLIGRAM(S): 20 TABLET, DELAYED RELEASE ORAL at 13:40

## 2018-02-12 RX ADMIN — CHLORHEXIDINE GLUCONATE 15 MILLILITER(S): 213 SOLUTION TOPICAL at 05:09

## 2018-02-12 RX ADMIN — NYSTATIN CREAM 1 APPLICATION(S): 100000 CREAM TOPICAL at 17:12

## 2018-02-12 RX ADMIN — SENNA PLUS 1 TABLET(S): 8.6 TABLET ORAL at 17:08

## 2018-02-12 RX ADMIN — NYSTATIN CREAM 1 APPLICATION(S): 100000 CREAM TOPICAL at 05:10

## 2018-02-12 RX ADMIN — MIDODRINE HYDROCHLORIDE 10 MILLIGRAM(S): 2.5 TABLET ORAL at 11:37

## 2018-02-12 NOTE — PROGRESS NOTE ADULT - ASSESSMENT
IMPRESSION:  1-ARF  2-Asp pneumonia   3-septic shock resolved      PLAN:    CNS: sedation as needed     HEENT:  oral care   PULMONARY: keep same vent managment try cpap as much she can tolerate   Asp precaution   CARDIOVASCULAR: keep Is =OS     GI: GI prophylaxis. peg feeding do KUB     RENAL: follow lytes     INFECTIOUS DISEASE: s/p ABX d/c central line pan cx , if spike temp will start broad spectrum BX     HEMATOLOGICAL:  DVT prophylaxis.    ENDOCRINE:  Follow up FS.  Insulin protocol if needed.

## 2018-02-12 NOTE — PROVIDER CONTACT NOTE (OTHER) - SITUATION
Dr. Power made aware that pt pulled out OGT.  Another OGT was placed and sitter instructed to watch for any arm movements.  CXR ordered for placement.
as per order discontinue central line when off levo. Pt. off levo since 2200.
rectal temp 100.7  pt having hematuria - urine from [facility] is light pink

## 2018-02-12 NOTE — PROGRESS NOTE ADULT - SUBJECTIVE AND OBJECTIVE BOX
[id number]  Patient is a 57y old female presents with a chief complaint of hypoxia at NH 2/2 aspiration PNA.  Pt has been unable to tolerate levophed weaning and SBT. Midodrine started, Levophed being weaned. Pt s/p trach/peg    PAST MEDICAL & SURGICAL HISTORY:      MEDICATIONS  (STANDING):  chlorhexidine 0.12% Liquid 15 milliLiter(s) Swish and Spit two times a day  dexmedetomidine Infusion 0.5 MICROgram(s)/kG/Hr (8 mL/Hr) IV Continuous <Continuous>  docusate sodium 100 milliGRAM(s) Oral three times a day  heparin  Injectable (Preservative-Free) 5000 Unit(s) SubCutaneous every 8 hours  influenza   Vaccine 0.5 milliLiter(s) IntraMuscular once  levothyroxine 75 MICROGram(s) Oral daily  midodrine 10 milliGRAM(s) Oral every 8 hours  norepinephrine Infusion 0.208 MICROgram(s)/kG/Min (25 mL/Hr) IV Continuous <Continuous>  nystatin Powder 1 Application(s) Topical every 12 hours  nystatin/triamcinolone Ointment 1 Application(s) Topical every 12 hours  pantoprazole  Injectable 40 milliGRAM(s) IV Push every 24 hours  senna 1 Tablet(s) Oral every 12 hours    MEDICATIONS  (PRN):  acetaminophen  Suppository 650 milliGRAM(s) Rectal every 4 hours PRN For Temp greater than 38 C (100.4 F)  LORazepam   Injectable 1 milliGRAM(s) IV Push every 6 hours PRN Anxiety    No Known Allergies      Overnight events:    Vital Signs Last 24 Hrs  T(C): 37.1 (12 Feb 2018 07:15), Max: 37.6 (12 Feb 2018 01:00)  T(F): 98.7 (12 Feb 2018 07:15), Max: 99.7 (12 Feb 2018 01:00)  HR: 60 (12 Feb 2018 07:00) (55 - 98)  BP: 102/52 (12 Feb 2018 07:00) (77/41 - 139/65)  BP(mean): 75 (12 Feb 2018 07:00) (53 - 94)  RR: 19 (12 Feb 2018 07:15) (14 - 26)  SpO2: 99% (12 Feb 2018 07:00) (91% - 100%)  CAPILLARY BLOOD GLUCOSE        I&O's Summary    11 Feb 2018 07:01  -  12 Feb 2018 07:00  --------------------------------------------------------  IN: 1753 mL / OUT: 590 mL / NET: 1163 mL    12 Feb 2018 07:01  -  12 Feb 2018 07:41  --------------------------------------------------------  IN: 0 mL / OUT: 15 mL / NET: -15 mL          PHYSICAL EXAM:  GENERAL: NAD, speaks in full sentences, no signs of respiratory distress  HEAD:  Atraumatic, Normocephalic  EYES: EOMI, PERRLA, conjunctiva and sclera clear  NECK: Supple, No JVD  CHEST/LUNG: Clear to auscultation bilaterally; No wheeze; No crackles; No accessory muscles used  HEART: Regular rate and rhythm; No murmurs;   ABDOMEN: Soft, Nontender, Nondistended; Bowel sounds present; No guarding  EXTREMITIES:  2+ Peripheral Pulses, No cyanosis or edema  PSYCH: AAOx3  NEUROLOGY: non-focal  SKIN: No rashes or lesions    Labs:                        10.2   8.44  )-----------( 263      ( 12 Feb 2018 04:30 )             31.6             02-12    142  |  109  |  13  ----------------------------<  122<H>  3.9   |  27  |  0.6<L>    Ca    8.5      12 Feb 2018 04:30  Mg     1.9     02-12    TPro  5.6<L>  /  Alb  2.1<L>  /  TBili  0.5  /  DBili  x   /  AST  35  /  ALT  28  /  AlkPhos  64  02-12    LIVER FUNCTIONS - ( 12 Feb 2018 04:30 )  Alb: 2.1 g/dL / Pro: 5.6 g/dL / ALK PHOS: 64 U/L / ALT: 28 U/L / AST: 35 U/L / GGT: x                       ABG - ( 12 Feb 2018 06:15 )  pH: 7.52  /  pCO2: 33    /  pO2: 95    / HCO3: 27    / Base Excess: 5.0   /  SaO2: 99                      Mode: Auto Mode: PRVC/ Volume Support  RR (machine): 14  TV (machine): 450  FiO2: 30  PEEP: 5  MAP: 9  PIP: 24      Imaging:    ECG:

## 2018-02-12 NOTE — PROGRESS NOTE ADULT - SUBJECTIVE AND OBJECTIVE BOX
Over Night Events:  Patient seen and examined no fever, no acute changes   except decrease urine output   off pressors       ROS:  See HPI    PHYSICAL EXAM    ICU Vital Signs Last 24 Hrs  T(C): 37.3 (12 Feb 2018 09:03), Max: 37.6 (12 Feb 2018 01:00)  T(F): 99.2 (12 Feb 2018 09:03), Max: 99.7 (12 Feb 2018 01:00)  HR: 62 (12 Feb 2018 09:00) (60 - 98)  BP: 87/45 (12 Feb 2018 09:00) (77/41 - 129/58)  BP(mean): 61 (12 Feb 2018 09:00) (53 - 85)  ABP: --  ABP(mean): --  RR: 14 (12 Feb 2018 09:03) (14 - 26)  SpO2: 97% (12 Feb 2018 09:00) (91% - 100%)      General: awake   HEENT:  tracheostomy   Lymph Nodes: NO cervical LN   Lungs: decrease bibasilar   Cardiovascular: Regular   Abdomen: Soft, Positive BS  Extremities: No clubbing   Skin:   Neurological: move ext b/l     I&O's Detail    11 Feb 2018 07:01  -  12 Feb 2018 07:00  --------------------------------------------------------  IN:    Enteral Tube Flush: 110 mL    norepinephrine Infusion: 143 mL    Osmolite: 1000 mL    Sodium Chloride 0.9% IV Bolus: 500 mL  Total IN: 1753 mL    OUT:    Indwelling Catheter - Urethral: 590 mL  Total OUT: 590 mL    Total NET: 1163 mL      12 Feb 2018 07:01  -  12 Feb 2018 09:49  --------------------------------------------------------  IN:    IV PiggyBack: 1000 mL    Oral Fluid: 75 mL  Total IN: 1075 mL    OUT:    Indwelling Catheter - Urethral: 25 mL  Total OUT: 25 mL    Total NET: 1050 mL          LABS:                          10.2   8.44  )-----------( 263      ( 12 Feb 2018 04:30 )             31.6         12 Feb 2018 04:30    142    |  109    |  13     ----------------------------<  122    3.9     |  27     |  0.6      Ca    8.5        12 Feb 2018 04:30  Mg     1.9       12 Feb 2018 04:30    TPro  5.6    /  Alb  2.1    /  TBili  0.5    /  DBili  x      /  AST  35     /  ALT  28     /  AlkPhos  64     12 Feb 2018 04:30  Amylase x     lipase x                                                                                                                                                                                                 Mode: Auto Mode: PRVC/ Volume Support  RR (machine): 14  TV (machine): 450  FiO2: 30  PEEP: 3  ITime: 1  MAP: 9  PIP: 12                                      ABG - ( 12 Feb 2018 06:15 )  pH: 7.52  /  pCO2: 33    /  pO2: 95    / HCO3: 27    / Base Excess: 5.0   /  SaO2: 99                  MEDICATIONS  (STANDING):  chlorhexidine 0.12% Liquid 15 milliLiter(s) Swish and Spit two times a day  dexmedetomidine Infusion 0.5 MICROgram(s)/kG/Hr (8 mL/Hr) IV Continuous <Continuous>  docusate sodium 100 milliGRAM(s) Oral three times a day  heparin  Injectable (Preservative-Free) 5000 Unit(s) SubCutaneous every 8 hours  influenza   Vaccine 0.5 milliLiter(s) IntraMuscular once  levothyroxine 75 MICROGram(s) Oral daily  midodrine 10 milliGRAM(s) Oral every 8 hours  norepinephrine Infusion 0.208 MICROgram(s)/kG/Min (25 mL/Hr) IV Continuous <Continuous>  nystatin Powder 1 Application(s) Topical every 12 hours  nystatin/triamcinolone Ointment 1 Application(s) Topical every 12 hours  pantoprazole  Injectable 40 milliGRAM(s) IV Push every 24 hours  senna 1 Tablet(s) Oral every 12 hours    MEDICATIONS  (PRN):  acetaminophen  Suppository 650 milliGRAM(s) Rectal every 4 hours PRN For Temp greater than 38 C (100.4 F)  LORazepam   Injectable 1 milliGRAM(s) IV Push every 6 hours PRN Anxiety          Xrays:  TLC:  OG  tracheostomy tube ok   decrease bibasilar opacity                                                                                     ECHO

## 2018-02-13 LAB
ALBUMIN SERPL ELPH-MCNC: 2.1 G/DL — LOW (ref 3–5.5)
ALP SERPL-CCNC: 63 U/L — SIGNIFICANT CHANGE UP (ref 30–115)
ALT FLD-CCNC: 27 U/L — SIGNIFICANT CHANGE UP (ref 0–41)
ANION GAP SERPL CALC-SCNC: 3 MMOL/L — LOW (ref 7–14)
AST SERPL-CCNC: 31 U/L — SIGNIFICANT CHANGE UP (ref 0–41)
BASE EXCESS BLDA CALC-SCNC: 0.8 MMOL/L — SIGNIFICANT CHANGE UP (ref -2–2)
BASOPHILS # BLD AUTO: 0.08 K/UL — SIGNIFICANT CHANGE UP (ref 0–0.2)
BASOPHILS NFR BLD AUTO: 1.2 % — HIGH (ref 0–1)
BILIRUB SERPL-MCNC: 0.5 MG/DL — SIGNIFICANT CHANGE UP (ref 0.2–1.2)
BUN SERPL-MCNC: 10 MG/DL — SIGNIFICANT CHANGE UP (ref 10–20)
CALCIUM SERPL-MCNC: 8.1 MG/DL — LOW (ref 8.5–10.1)
CHLORIDE SERPL-SCNC: 109 MMOL/L — SIGNIFICANT CHANGE UP (ref 98–110)
CO2 SERPL-SCNC: 26 MMOL/L — SIGNIFICANT CHANGE UP (ref 17–32)
CREAT SERPL-MCNC: 0.6 MG/DL — LOW (ref 0.7–1.5)
EOSINOPHIL # BLD AUTO: 0.32 K/UL — SIGNIFICANT CHANGE UP (ref 0–0.7)
EOSINOPHIL NFR BLD AUTO: 4.6 % — SIGNIFICANT CHANGE UP (ref 0–8)
GLUCOSE SERPL-MCNC: 81 MG/DL — SIGNIFICANT CHANGE UP (ref 70–110)
HCO3 BLDA-SCNC: 24 MMOL/L — SIGNIFICANT CHANGE UP (ref 23–27)
HCT VFR BLD CALC: 31.1 % — LOW (ref 37–47)
HGB BLD-MCNC: 10 G/DL — LOW (ref 14–18)
HOROWITZ INDEX BLDA+IHG-RTO: 30 — SIGNIFICANT CHANGE UP
IMM GRANULOCYTES NFR BLD AUTO: 1.3 % — HIGH (ref 0.1–0.3)
LYMPHOCYTES # BLD AUTO: 1.47 K/UL — SIGNIFICANT CHANGE UP (ref 1.2–3.4)
LYMPHOCYTES # BLD AUTO: 21.2 % — SIGNIFICANT CHANGE UP (ref 20.5–51.1)
MAGNESIUM SERPL-MCNC: 1.8 MG/DL — SIGNIFICANT CHANGE UP (ref 1.8–2.4)
MCHC RBC-ENTMCNC: 32.2 G/DL — SIGNIFICANT CHANGE UP (ref 32–37)
MCHC RBC-ENTMCNC: 33 PG — HIGH (ref 27–31)
MCV RBC AUTO: 102.6 FL — HIGH (ref 81–91)
MONOCYTES # BLD AUTO: 0.73 K/UL — HIGH (ref 0.1–0.6)
MONOCYTES NFR BLD AUTO: 10.5 % — HIGH (ref 1.7–9.3)
NEUTROPHILS # BLD AUTO: 4.24 K/UL — SIGNIFICANT CHANGE UP (ref 1.4–6.5)
NEUTROPHILS NFR BLD AUTO: 61.2 % — SIGNIFICANT CHANGE UP (ref 42.2–75.2)
PCO2 BLDA: 34 MMHG — LOW (ref 38–42)
PH BLDA: 7.46 — HIGH (ref 7.38–7.42)
PLATELET # BLD AUTO: 255 K/UL — SIGNIFICANT CHANGE UP (ref 130–400)
PO2 BLDA: 92 MMHG — SIGNIFICANT CHANGE UP (ref 78–95)
POTASSIUM SERPL-MCNC: 3.5 MMOL/L — SIGNIFICANT CHANGE UP (ref 3.5–5)
POTASSIUM SERPL-SCNC: 3.5 MMOL/L — SIGNIFICANT CHANGE UP (ref 3.5–5)
PROT SERPL-MCNC: 5.6 G/DL — LOW (ref 6–8)
RBC # BLD: 3.03 M/UL — LOW (ref 4.2–5.4)
RBC # FLD: 17.1 % — HIGH (ref 11.5–14.5)
SAO2 % BLDA: 98 % — SIGNIFICANT CHANGE UP (ref 94–98)
SODIUM SERPL-SCNC: 138 MMOL/L — SIGNIFICANT CHANGE UP (ref 135–146)
WBC # BLD: 6.93 K/UL — SIGNIFICANT CHANGE UP (ref 4.8–10.8)
WBC # FLD AUTO: 6.93 K/UL — SIGNIFICANT CHANGE UP (ref 4.8–10.8)

## 2018-02-13 RX ORDER — DOCUSATE SODIUM 100 MG
100 CAPSULE ORAL
Qty: 0 | Refills: 0 | Status: DISCONTINUED | OUTPATIENT
Start: 2018-02-13 | End: 2018-02-13

## 2018-02-13 RX ORDER — HEPARIN SODIUM 5000 [USP'U]/ML
5000 INJECTION INTRAVENOUS; SUBCUTANEOUS EVERY 8 HOURS
Qty: 0 | Refills: 0 | Status: DISCONTINUED | OUTPATIENT
Start: 2018-02-13 | End: 2018-02-17

## 2018-02-13 RX ORDER — CHLORHEXIDINE GLUCONATE 213 G/1000ML
15 SOLUTION TOPICAL
Qty: 0 | Refills: 0 | Status: DISCONTINUED | OUTPATIENT
Start: 2018-02-13 | End: 2018-02-18

## 2018-02-13 RX ORDER — HALOPERIDOL DECANOATE 100 MG/ML
0.5 INJECTION INTRAMUSCULAR DAILY
Qty: 0 | Refills: 0 | Status: DISCONTINUED | OUTPATIENT
Start: 2018-02-13 | End: 2018-02-23

## 2018-02-13 RX ADMIN — PANTOPRAZOLE SODIUM 40 MILLIGRAM(S): 20 TABLET, DELAYED RELEASE ORAL at 15:12

## 2018-02-13 RX ADMIN — CHLORHEXIDINE GLUCONATE 15 MILLILITER(S): 213 SOLUTION TOPICAL at 05:37

## 2018-02-13 RX ADMIN — Medication 650 MILLIGRAM(S): at 23:27

## 2018-02-13 RX ADMIN — SODIUM CHLORIDE 75 MILLILITER(S): 9 INJECTION INTRAMUSCULAR; INTRAVENOUS; SUBCUTANEOUS at 18:04

## 2018-02-13 RX ADMIN — SENNA PLUS 1 TABLET(S): 8.6 TABLET ORAL at 17:44

## 2018-02-13 RX ADMIN — HEPARIN SODIUM 5000 UNIT(S): 5000 INJECTION INTRAVENOUS; SUBCUTANEOUS at 21:56

## 2018-02-13 RX ADMIN — SODIUM CHLORIDE 75 MILLILITER(S): 9 INJECTION INTRAMUSCULAR; INTRAVENOUS; SUBCUTANEOUS at 06:12

## 2018-02-13 RX ADMIN — Medication 650 MILLIGRAM(S): at 12:37

## 2018-02-13 RX ADMIN — HALOPERIDOL DECANOATE 0.5 MILLIGRAM(S): 100 INJECTION INTRAMUSCULAR at 15:12

## 2018-02-13 RX ADMIN — CHLORHEXIDINE GLUCONATE 15 MILLILITER(S): 213 SOLUTION TOPICAL at 17:44

## 2018-02-13 RX ADMIN — HEPARIN SODIUM 5000 UNIT(S): 5000 INJECTION INTRAVENOUS; SUBCUTANEOUS at 16:01

## 2018-02-13 RX ADMIN — Medication 650 MILLIGRAM(S): at 15:12

## 2018-02-13 RX ADMIN — Medication 650 MILLIGRAM(S): at 19:25

## 2018-02-13 RX ADMIN — HEPARIN SODIUM 5000 UNIT(S): 5000 INJECTION INTRAVENOUS; SUBCUTANEOUS at 05:40

## 2018-02-13 RX ADMIN — NYSTATIN CREAM 1 APPLICATION(S): 100000 CREAM TOPICAL at 17:45

## 2018-02-13 RX ADMIN — Medication 1 APPLICATION(S): at 17:45

## 2018-02-13 RX ADMIN — NYSTATIN CREAM 1 APPLICATION(S): 100000 CREAM TOPICAL at 05:39

## 2018-02-13 RX ADMIN — Medication 650 MILLIGRAM(S): at 17:46

## 2018-02-13 RX ADMIN — Medication 1 MILLIGRAM(S): at 01:22

## 2018-02-13 RX ADMIN — Medication 1 APPLICATION(S): at 05:41

## 2018-02-13 RX ADMIN — Medication 1 MILLIGRAM(S): at 22:08

## 2018-02-13 RX ADMIN — MIDODRINE HYDROCHLORIDE 10 MILLIGRAM(S): 2.5 TABLET ORAL at 12:52

## 2018-02-13 RX ADMIN — CHLORHEXIDINE GLUCONATE 15 MILLILITER(S): 213 SOLUTION TOPICAL at 11:44

## 2018-02-13 RX ADMIN — MIDODRINE HYDROCHLORIDE 10 MILLIGRAM(S): 2.5 TABLET ORAL at 17:44

## 2018-02-13 NOTE — PROGRESS NOTE ADULT - ASSESSMENT
Assessment  Acute Respiratory failure  Dysphagia    Assessment and plan Acute Respiratory failure, possible r/t aspiration   Dysphagia  MR/DD  hypothyroid    Feeds on hold today because pt reportedly seemed to be in pain when G-tube was manipulated or flushed.

## 2018-02-13 NOTE — PROGRESS NOTE ADULT - PROBLEM SELECTOR PLAN 2
-c/w synthroid
-s/p trach
-s/p trach
continue antbx  follow culture results
continue antbx  follow culture results
-SBT, wean off vent as tolerated  -trach possible for today
-c/w synthroid
-c/w synthroid
-s/p trach
-s/p trach
-c/w synthroid
still on levophed low dose and midodrine, attempt to wean off of pressure support

## 2018-02-13 NOTE — PROGRESS NOTE ADULT - PROBLEM SELECTOR PROBLEM 3
Acute respiratory failure with hypoxia
Aspiration pneumonia, unspecified aspiration pneumonia type, unspecified laterality, unspecified part of lung
Aspiration pneumonia, unspecified aspiration pneumonia type, unspecified laterality, unspecified part of lung
Hypothyroid
Hypothyroid
Acute respiratory failure with hypoxia
Acute respiratory failure with hypoxia
Aspiration pneumonia, unspecified aspiration pneumonia type, unspecified laterality, unspecified part of lung
Acute respiratory failure with hypoxia
Hypothyroidism, unspecified type

## 2018-02-13 NOTE — PROGRESS NOTE ADULT - PROBLEM SELECTOR PLAN 4
peg feeding
-on levophed x 3 weeks wean as tolerated  -midodrine
-on levophed x 3 weeks wean as tolerated  -midodrine  -check AM cortisol level
-pt completed 10 days of antibiotics, d/c antibiotics
wean pressors as tolerated  dvt and gi prophylaxis  nutrition
wean pressors as tolerated  dvt and gi prophylaxis  nutrition
-c/w meropenem, vancomycin
-c/w meropenem, vancomycin
-on levophed x 3 weeks  -midodrine d
-on levophed x 3 weeks  -midodrine d
-on levophed x 3 weeks  -midodrine dose increased today
-pt completed 10 days of antibiotics, d/c antibiotics

## 2018-02-13 NOTE — PROGRESS NOTE ADULT - SUBJECTIVE AND OBJECTIVE BOX
HPI:  Pt seen and evaluated admitted fro difficulty breathing and vomited x2 overnight as per aide  remain vented  on Peg tube feed        ICU Vital Signs Last 24 Hrs  T(C): 36.9 (13 Feb 2018 09:00), Max: 37.3 (13 Feb 2018 01:00)  T(F): 98.5 (13 Feb 2018 09:00), Max: 99.1 (13 Feb 2018 01:00)  HR: 74 (13 Feb 2018 08:00) (57 - 101)  BP: 131/61 (13 Feb 2018 08:00) (99/51 - 131/61)  BP(mean): 78 (13 Feb 2018 08:00) (70 - 90)  ABP: --  ABP(mean): --  RR: 21 (13 Feb 2018 09:00) (14 - 33)  SpO2: 99% (13 Feb 2018 08:00) (96% - 100%)    I&O's Detail    12 Feb 2018 07:01  -  13 Feb 2018 07:00  --------------------------------------------------------  IN:    Enteral Tube Flush: 30 mL    IV PiggyBack: 1015 mL    norepinephrine Infusion: 72 mL    Oral Fluid: 75 mL    Osmolite: 160 mL    sodium chloride 0.9%.: 1087.5 mL  Total IN: 2439.5 mL    OUT:    Indwelling Catheter - Urethral: 835 mL  Total OUT: 835 mL    Total NET: 1604.5 mL      13 Feb 2018 07:01  -  13 Feb 2018 10:34  --------------------------------------------------------  IN:  Total IN: 0 mL    OUT:    Indwelling Catheter - Urethral: 500 mL  Total OUT: 500 mL    Total NET: -500 mL        Mode: Auto Mode: PRVC/ Volume Support  RR (machine): 14  TV (machine): 450  FiO2: 30  PEEP: 5  ITime: 1  MAP: 10  PIP: 25      PHYSICAL EXAM:  GENERAL: NAD, well-groomed, well-developed, Alert & Oriented X3  HEENT:  Atraumatic, Normocephalic, conjunctiva and sclera clear, Moist mucous membranes, Good dentition, No lesions, Supple, No JVD  CHEST/LUNG:    HEART:    ABDOMEN: Soft, Nontender, Nondistended; Bowel sounds present  EXTREMITIES:  2+ Peripheral Pulses, No clubbing, cyanosis, or edema  SKIN: No rashes or lesions  IV ACCESS:  FEEDING ACCESS:    MEDICATIONS  (STANDING):  acetaminophen   Tablet. 650 milliGRAM(s) Oral every 6 hours  chlorhexidine 0.12% Liquid 15 milliLiter(s) Swish and Spit two times a day  dexmedetomidine Infusion 0.5 MICROgram(s)/kG/Hr (8 mL/Hr) IV Continuous <Continuous>  docusate sodium 100 milliGRAM(s) Oral three times a day  haloperidol     Tablet 0.5 milliGRAM(s) Oral daily  heparin  Injectable (Preservative-Free) 5000 Unit(s) SubCutaneous every 8 hours  influenza   Vaccine 0.5 milliLiter(s) IntraMuscular once  levothyroxine 75 MICROGram(s) Oral daily  midodrine 10 milliGRAM(s) Oral every 8 hours  norepinephrine Infusion 0.208 MICROgram(s)/kG/Min (25 mL/Hr) IV Continuous <Continuous>  nystatin Powder 1 Application(s) Topical every 12 hours  nystatin/triamcinolone Ointment 1 Application(s) Topical every 12 hours  pantoprazole  Injectable 40 milliGRAM(s) IV Push every 24 hours  senna 1 Tablet(s) Oral every 12 hours  sodium chloride 0.9%. 1000 milliLiter(s) (75 mL/Hr) IV Continuous <Continuous>    MEDICATIONS  (PRN):  acetaminophen  Suppository 650 milliGRAM(s) Rectal every 4 hours PRN For Temp greater than 38 C (100.4 F)  LORazepam   Injectable 1 milliGRAM(s) IV Push every 6 hours PRN Anxiety    Allergies    No Known Allergies    Intolerances        LABS:                        10.0   6.93  )-----------( 255      ( 13 Feb 2018 06:18 )             31.1     02-13    138  |  109  |  10  ----------------------------<  81  3.5   |  26  |  0.6<L>    Ca    8.1<L>      13 Feb 2018 06:18  Mg     1.8     02-13    TPro  5.6<L>  /  Alb  2.1<L>  /  TBili  0.5  /  DBili  x   /  AST  31  /  ALT  27  /  AlkPhos  63  02-13    CAPILLARY BLOOD GLUCOSE          ABG - ( 13 Feb 2018 05:57 )  pH: 7.46  /  pCO2: 34    /  pO2: 92    / HCO3: 24    / Base Excess: 0.8   /  SaO2: 98                  Height (cm): 165.1 (02-09-18 @ 14:46)  Weight (kg): 60.2 (02-10-18 @ 02:00)  BMI (kg/m2): 22.1 (02-10-18 @ 02:00)  BSA (m2): 1.66 (02-10-18 @ 02:00)  Ideal Body Weight: kG     Current Diet: [  ]NPO  Appetite: [  ]Poor [  ]Adequate [  ]Good    Nutritional Requirements  Carbohydrates: Total grams / kG / day: ___ Total Calories: ___  Lipids: Total grams / kG / day: ___ Total Calories: ___  Proteins: Total grams / kG / day: ___ Total Calories: ___  Non-Protein Calorie to Nitrogen Ratio: HPI:  Pt seen and evaluated admitted fro difficulty breathing and vomited x2 overnight as per aide  remain vented  pt for possibly transfer north to the vent unit  on Peg tube feed        ICU Vital Signs Last 24 Hrs  T(C): 36.9 (13 Feb 2018 09:00), Max: 37.3 (13 Feb 2018 01:00)  T(F): 98.5 (13 Feb 2018 09:00), Max: 99.1 (13 Feb 2018 01:00)  HR: 74 (13 Feb 2018 08:00) (57 - 101)  BP: 131/61 (13 Feb 2018 08:00) (99/51 - 131/61)  BP(mean): 78 (13 Feb 2018 08:00) (70 - 90)  ABP: --  ABP(mean): --  RR: 21 (13 Feb 2018 09:00) (14 - 33)  SpO2: 99% (13 Feb 2018 08:00) (96% - 100%)    I&O's Detail    12 Feb 2018 07:01  -  13 Feb 2018 07:00  --------------------------------------------------------  IN:    Enteral Tube Flush: 30 mL    IV PiggyBack: 1015 mL    norepinephrine Infusion: 72 mL    Oral Fluid: 75 mL    Osmolite: 160 mL    sodium chloride 0.9%.: 1087.5 mL  Total IN: 2439.5 mL    OUT:    Indwelling Catheter - Urethral: 835 mL  Total OUT: 835 mL    Total NET: 1604.5 mL      13 Feb 2018 07:01  -  13 Feb 2018 10:34  --------------------------------------------------------  IN:  Total IN: 0 mL    OUT:    Indwelling Catheter - Urethral: 500 mL  Total OUT: 500 mL    Total NET: -500 mL        Mode: Auto Mode: PRVC/ Volume Support  RR (machine): 14  TV (machine): 450  FiO2: 30  PEEP: 5  ITime: 1  MAP: 10  PIP: 25      PHYSICAL EXAM:  GENERAL: NAD, well-groomed, well-developed, Alert & Oriented X3  HEENT:  Atraumatic, Normocephalic, conjunctiva and sclera clear, Moist mucous membranes, Good dentition, No lesions, Supple, No JVD  CHEST/LUNG:    HEART:    ABDOMEN: Soft, Nontender, Nondistended; Bowel sounds present  EXTREMITIES:  2+ Peripheral Pulses, No clubbing, cyanosis, or edema  SKIN: No rashes or lesions  IV ACCESS:  FEEDING ACCESS:    MEDICATIONS  (STANDING):  acetaminophen   Tablet. 650 milliGRAM(s) Oral every 6 hours  chlorhexidine 0.12% Liquid 15 milliLiter(s) Swish and Spit two times a day  dexmedetomidine Infusion 0.5 MICROgram(s)/kG/Hr (8 mL/Hr) IV Continuous <Continuous>  docusate sodium 100 milliGRAM(s) Oral three times a day  haloperidol     Tablet 0.5 milliGRAM(s) Oral daily  heparin  Injectable (Preservative-Free) 5000 Unit(s) SubCutaneous every 8 hours  influenza   Vaccine 0.5 milliLiter(s) IntraMuscular once  levothyroxine 75 MICROGram(s) Oral daily  midodrine 10 milliGRAM(s) Oral every 8 hours  norepinephrine Infusion 0.208 MICROgram(s)/kG/Min (25 mL/Hr) IV Continuous <Continuous>  nystatin Powder 1 Application(s) Topical every 12 hours  nystatin/triamcinolone Ointment 1 Application(s) Topical every 12 hours  pantoprazole  Injectable 40 milliGRAM(s) IV Push every 24 hours  senna 1 Tablet(s) Oral every 12 hours  sodium chloride 0.9%. 1000 milliLiter(s) (75 mL/Hr) IV Continuous <Continuous>    MEDICATIONS  (PRN):  acetaminophen  Suppository 650 milliGRAM(s) Rectal every 4 hours PRN For Temp greater than 38 C (100.4 F)  LORazepam   Injectable 1 milliGRAM(s) IV Push every 6 hours PRN Anxiety    Allergies    No Known Allergies    Intolerances        LABS:                        10.0   6.93  )-----------( 255      ( 13 Feb 2018 06:18 )             31.1     02-13    138  |  109  |  10  ----------------------------<  81  3.5   |  26  |  0.6<L>    Ca    8.1<L>      13 Feb 2018 06:18  Mg     1.8     02-13    TPro  5.6<L>  /  Alb  2.1<L>  /  TBili  0.5  /  DBili  x   /  AST  31  /  ALT  27  /  AlkPhos  63  02-13    CAPILLARY BLOOD GLUCOSE          ABG - ( 13 Feb 2018 05:57 )  pH: 7.46  /  pCO2: 34    /  pO2: 92    / HCO3: 24    / Base Excess: 0.8   /  SaO2: 98                  Height (cm): 165.1 (02-09-18 @ 14:46)  Weight (kg): 60.2 (02-10-18 @ 02:00)  BMI (kg/m2): 22.1 (02-10-18 @ 02:00)  BSA (m2): 1.66 (02-10-18 @ 02:00)  Ideal Body Weight: kG     Current Diet: [  ]NPO  Appetite: [  ]Poor [  ]Adequate [  ]Good    Nutritional Requirements  Carbohydrates: Total grams / kG / day: ___ Total Calories: ___  Lipids: Total grams / kG / day: ___ Total Calories: ___  Proteins: Total grams / kG / day: ___ Total Calories: ___  Non-Protein Calorie to Nitrogen Ratio: HPI:  Pt seen anPatient is a 57y old  Female who presents with a chief complaint of hypoxia at NH. Pt had difficulty breathing after eating dinner, brought to ED. Pt previously hospitalized 1month ago for aspiration PNA. Previously couldn't tolerate barium swallow. Pt intubated and placed on pressors. Pt has been unable to tolerate levophed weaning and SBT. Midodrine started today.  remain vented  pt for possibly transfer north to the vent unit  on Peg tube feed    Height (cm): 165.1 (02-09-18 @ 14:46)  Weight (kg): 60.2 (02-10-18 @ 02:00)  ICU Vital Signs Last 24 Hrs  T(F): 98.5 (13 Feb 2018 09:00), Max: 99.1 (13 Feb 2018 01:00)  HR: 74 (13 Feb 2018 08:00) (57 - 101)  BP: 131/61 (13 Feb 2018 08:00) (99/51 - 131/61)  RR: 21 (13 Feb 2018 09:00) (14 - 33)  SpO2: 99% (13 Feb 2018 08:00) (96% - 100%)    I&O's Detail  12 Feb 2018 07:01  -  13 Feb 2018 07:00  --------------------------------------------------------  IN:    Enteral Tube Flush: 30 mL    IV PiggyBack: 1015 mL    norepinephrine Infusion: 72 mL    Oral Fluid: 75 mL    Osmolite: 160 mL    sodium chloride 0.9%.: 1087.5 mL  Total IN: 2439.5 mL    OUT:    Indwelling Catheter - Urethral: 835 mL  Total OUT: 835 mL    Total NET: 1604.5 mL      13 Feb 2018 07:01  -  13 Feb 2018 10:34  --------------------------------------------------------  IN:  Total IN: 0 mL    OUT:    Indwelling Catheter - Urethral: 500 mL  Total OUT: 500 mL    Total NET: -500 mL  Mode: Auto Mode: PRVC/ Volume Support  RR (machine): 14  TV (machine): 450  FiO2: 30  PEEP: 5  ITime: 1  MAP: 10  PIP: 25      PHYSICAL EXAM:    ABDOMEN: Soft, Nontender, Nondistended; + PEG TUBE IN PLACE SITE C/C/I    IV ACCESS:  FEEDING ACCESS: PEG TUBE    MEDICATIONS  (STANDING):  acetaminophen   Tablet. 650 milliGRAM(s) Oral every 6 hours  chlorhexidine 0.12% Liquid 15 milliLiter(s) Swish and Spit two times a day  dexmedetomidine Infusion 0.5 MICROgram(s)/kG/Hr (8 mL/Hr) IV Continuous <Continuous>  docusate sodium 100 milliGRAM(s) Oral three times a day  haloperidol     Tablet 0.5 milliGRAM(s) Oral daily  heparin  Injectable (Preservative-Free) 5000 Unit(s) SubCutaneous every 8 hours  influenza   Vaccine 0.5 milliLiter(s) IntraMuscular once  levothyroxine 75 MICROGram(s) Oral daily  midodrine 10 milliGRAM(s) Oral every 8 hours  norepinephrine Infusion 0.208 MICROgram(s)/kG/Min (25 mL/Hr) IV Continuous <Continuous>  nystatin Powder 1 Application(s) Topical every 12 hours  nystatin/triamcinolone Ointment 1 Application(s) Topical every 12 hours  pantoprazole  Injectable 40 milliGRAM(s) IV Push every 24 hours  senna 1 Tablet(s) Oral every 12 hours  sodium chloride 0.9%. 1000 milliLiter(s) (75 mL/Hr) IV Continuous <Continuous>    MEDICATIONS  (PRN):  acetaminophen  Suppository 650 milliGRAM(s) Rectal every 4 hours PRN For Temp greater than 38 C (100.4 F)  LORazepam   Injectable 1 milliGRAM(s) IV Push every 6 hours PRN Anxiety    LABS:                        10.0   6.93  )-----------( 255      ( 13 Feb 2018 06:18 )             31.1     02-13    138  |  109  |  10  ----------------------------<  81  3.5   |  26  |  0.6<L>    Ca    8.1<L>      13 Feb 2018 06:18  Mg     1.8     02-13    TPro  5.6<L>  /  Alb  2.1<L>  /  TBili  0.5  /  DBili  x   /  AST  31  /  ALT  27  /  AlkPhos  63  02-13    ABG - ( 13 Feb 2018 05:57 )  pH: 7.46  /  pCO2: 34    /  pO2: 92    / HCO3: 24    / Base Excess: 0.8   /  SaO2: 98          Current Diet: [  ]NPO  Nutritional Requirements  Carbohydrates: Total grams / kG / day: ___ Total Calories: ___  Proteins: Total grams / kG / day: ___ Total Calories: ___ HPI:  Patient is a 57y old  Female who presents with a chief complaint of hypoxia at NH. Pt had difficulty breathing after eating dinner, brought to ED. Pt previously hospitalized 1month ago for aspiration PNA. Previously couldn't tolerate barium swallow. Pt intubated and placed on pressors. Pt has been unable to tolerate levophed weaning and SBT. Midodrine started today.  remain vented  pt for possibly transfer north to the vent unit  on Peg tube feed    Height (cm): 165.1 (02-09-18 @ 14:46)  Weight (kg): 60.2 (02-10-18 @ 02:00)  ICU Vital Signs Last 24 Hrs  T(F): 98.5 (13 Feb 2018 09:00), Max: 99.1 (13 Feb 2018 01:00)  HR: 74 (13 Feb 2018 08:00) (57 - 101)  BP: 131/61 (13 Feb 2018 08:00) (99/51 - 131/61)  RR: 21 (13 Feb 2018 09:00) (14 - 33)  SpO2: 99% (13 Feb 2018 08:00) (96% - 100%)    I&O's Detail  12 Feb 2018 07:01  -  13 Feb 2018 07:00  --------------------------------------------------------  IN:    Enteral Tube Flush: 30 mL    IV PiggyBack: 1015 mL    norepinephrine Infusion: 72 mL    Oral Fluid: 75 mL    Osmolite: 160 mL    sodium chloride 0.9%.: 1087.5 mL  Total IN: 2439.5 mL    OUT:    Indwelling Catheter - Urethral: 835 mL  Total OUT: 835 mL    Total NET: 1604.5 mL      13 Feb 2018 07:01  -  13 Feb 2018 10:34  --------------------------------------------------------  IN:  Total IN: 0 mL    OUT:    Indwelling Catheter - Urethral: 500 mL  Total OUT: 500 mL    Total NET: -500 mL  Mode: Auto Mode: PRVC/ Volume Support  RR (machine): 14  TV (machine): 450  FiO2: 30  PEEP: 5  ITime: 1  MAP: 10  PIP: 25      PHYSICAL EXAM:    ABDOMEN: Soft, Nontender, Nondistended; + PEG TUBE IN PLACE SITE C/C/I    IV ACCESS:  FEEDING ACCESS: PEG TUBE    MEDICATIONS  (STANDING):  acetaminophen   Tablet. 650 milliGRAM(s) Oral every 6 hours  chlorhexidine 0.12% Liquid 15 milliLiter(s) Swish and Spit two times a day  dexmedetomidine Infusion 0.5 MICROgram(s)/kG/Hr (8 mL/Hr) IV Continuous <Continuous>  docusate sodium 100 milliGRAM(s) Oral three times a day  haloperidol     Tablet 0.5 milliGRAM(s) Oral daily  heparin  Injectable (Preservative-Free) 5000 Unit(s) SubCutaneous every 8 hours  influenza   Vaccine 0.5 milliLiter(s) IntraMuscular once  levothyroxine 75 MICROGram(s) Oral daily  midodrine 10 milliGRAM(s) Oral every 8 hours  norepinephrine Infusion 0.208 MICROgram(s)/kG/Min (25 mL/Hr) IV Continuous <Continuous>  nystatin Powder 1 Application(s) Topical every 12 hours  nystatin/triamcinolone Ointment 1 Application(s) Topical every 12 hours  pantoprazole  Injectable 40 milliGRAM(s) IV Push every 24 hours  senna 1 Tablet(s) Oral every 12 hours  sodium chloride 0.9%. 1000 milliLiter(s) (75 mL/Hr) IV Continuous <Continuous>    MEDICATIONS  (PRN):  acetaminophen  Suppository 650 milliGRAM(s) Rectal every 4 hours PRN For Temp greater than 38 C (100.4 F)  LORazepam   Injectable 1 milliGRAM(s) IV Push every 6 hours PRN Anxiety    LABS:                        10.0   6.93  )-----------( 255      ( 13 Feb 2018 06:18 )             31.1     02-13    138  |  109  |  10  ----------------------------<  81  3.5   |  26  |  0.6<L>    Ca    8.1<L>      13 Feb 2018 06:18  Mg     1.8     02-13    TPro  5.6<L>  /  Alb  2.1<L>  /  TBili  0.5  /  DBili  x   /  AST  31  /  ALT  27  /  AlkPhos  63  02-13    ABG - ( 13 Feb 2018 05:57 )  pH: 7.46  /  pCO2: 34    /  pO2: 92    / HCO3: 24    / Base Excess: 0.8   /  SaO2: 98          Current Diet: [  ]NPO  Nutritional Requirements  Carbohydrates: Total grams / kG / day: ___ Total Calories: ___  Proteins: Total grams / kG / day: ___ Total Calories: ___ HPI:  Patient is a 57y old  Female who presents with a chief complaint of hypoxia at NH. Pt had difficulty breathing after eating dinner, brought to ED. Pt previously hospitalized 1month ago for aspiration PNA. Previously couldn't tolerate barium swallow. Pt intubated and placed on pressors. Pt has been unable to tolerate levophed weaning and SBT. Midodrine started today.  s/p Trach and Peg tube feed  remain vented via trach  pt for possibly transfer north to the vent unit  on Peg tube feed    Height (cm): 165.1 (02-09-18 @ 14:46)  Weight (kg): 60.2 (02-10-18 @ 02:00)  ICU Vital Signs Last 24 Hrs  T(F): 98.5 (13 Feb 2018 09:00), Max: 99.1 (13 Feb 2018 01:00)  HR: 74 (13 Feb 2018 08:00) (57 - 101)  BP: 131/61 (13 Feb 2018 08:00) (99/51 - 131/61)  RR: 21 (13 Feb 2018 09:00) (14 - 33)  SpO2: 99% (13 Feb 2018 08:00) (96% - 100%)    I&O's Detail  12 Feb 2018 07:01  -  13 Feb 2018 07:00  --------------------------------------------------------  IN:    Enteral Tube Flush: 30 mL    IV PiggyBack: 1015 mL    norepinephrine Infusion: 72 mL    Oral Fluid: 75 mL    Osmolite: 160 mL    sodium chloride 0.9%.: 1087.5 mL  Total IN: 2439.5 mL    OUT:    Indwelling Catheter - Urethral: 835 mL  Total OUT: 835 mL    Total NET: 1604.5 mL      13 Feb 2018 07:01  -  13 Feb 2018 10:34  --------------------------------------------------------  IN:  Total IN: 0 mL    OUT:    Indwelling Catheter - Urethral: 500 mL  Total OUT: 500 mL    Total NET: -500 mL  Mode: Auto Mode: PRVC/ Volume Support  RR (machine): 14  TV (machine): 450  FiO2: 30  PEEP: 5  ITime: 1  MAP: 10  PIP: 25      PHYSICAL EXAM:    ABDOMEN: Soft, Nontender, Nondistended; + PEG TUBE IN PLACE SITE C/C/I    IV ACCESS:  FEEDING ACCESS: PEG TUBE    MEDICATIONS  (STANDING):  acetaminophen   Tablet. 650 milliGRAM(s) Oral every 6 hours  chlorhexidine 0.12% Liquid 15 milliLiter(s) Swish and Spit two times a day  dexmedetomidine Infusion 0.5 MICROgram(s)/kG/Hr (8 mL/Hr) IV Continuous <Continuous>  docusate sodium 100 milliGRAM(s) Oral three times a day  haloperidol     Tablet 0.5 milliGRAM(s) Oral daily  heparin  Injectable (Preservative-Free) 5000 Unit(s) SubCutaneous every 8 hours  influenza   Vaccine 0.5 milliLiter(s) IntraMuscular once  levothyroxine 75 MICROGram(s) Oral daily  midodrine 10 milliGRAM(s) Oral every 8 hours  norepinephrine Infusion 0.208 MICROgram(s)/kG/Min (25 mL/Hr) IV Continuous <Continuous>  nystatin Powder 1 Application(s) Topical every 12 hours  nystatin/triamcinolone Ointment 1 Application(s) Topical every 12 hours  pantoprazole  Injectable 40 milliGRAM(s) IV Push every 24 hours  senna 1 Tablet(s) Oral every 12 hours  sodium chloride 0.9%. 1000 milliLiter(s) (75 mL/Hr) IV Continuous <Continuous>    MEDICATIONS  (PRN):  acetaminophen  Suppository 650 milliGRAM(s) Rectal every 4 hours PRN For Temp greater than 38 C (100.4 F)  LORazepam   Injectable 1 milliGRAM(s) IV Push every 6 hours PRN Anxiety    LABS:                        10.0   6.93  )-----------( 255      ( 13 Feb 2018 06:18 )             31.1     02-13    138  |  109  |  10  ----------------------------<  81  3.5   |  26  |  0.6<L>    Ca    8.1<L>      13 Feb 2018 06:18  Mg     1.8     02-13    TPro  5.6<L>  /  Alb  2.1<L>  /  TBili  0.5  /  DBili  x   /  AST  31  /  ALT  27  /  AlkPhos  63  02-13    ABG - ( 13 Feb 2018 05:57 )  pH: 7.46  /  pCO2: 34    /  pO2: 92    / HCO3: 24    / Base Excess: 0.8   /  SaO2: 98          Current Diet: [  ]NPO  Nutritional Requirements  Carbohydrates: Total grams / kG / day: ___ Total Calories: ___  Proteins: Total grams / kG / day: ___ Total Calories: ___ HPI:  Patient is a 57y old  Female who presents with a chief complaint of hypoxia at NH. Pt had difficulty breathing after eating dinner, brought to ED. Pt previously hospitalized 1month ago for aspiration PNA. Previously couldn't tolerate barium swallow. Pt intubated and placed on pressors. Pt has been unable to tolerate levophed weaning and SBT. Midodrine started today.  s/p Trach and Peg tube feed  remain vented via trach  event noted  peg tube feed on hold sice 2/12/17 as per ccu team pt has abdominal pain at the site of the the peg tube when feeding and flushing   pt for possibly transfer north to the vent unit  on Peg tube feed: discontinued    Height (cm): 165.1 (02-09-18 @ 14:46)  Weight (kg): 60.2 (02-10-18 @ 02:00)  ICU Vital Signs Last 24 Hrs  T(F): 98.5 (13 Feb 2018 09:00), Max: 99.1 (13 Feb 2018 01:00)  HR: 74 (13 Feb 2018 08:00) (57 - 101)  BP: 131/61 (13 Feb 2018 08:00) (99/51 - 131/61)  RR: 21 (13 Feb 2018 09:00) (14 - 33)  SpO2: 99% (13 Feb 2018 08:00) (96% - 100%)    I&O's Detail  12 Feb 2018 07:01  -  13 Feb 2018 07:00  --------------------------------------------------------  IN:    Enteral Tube Flush: 30 mL    IV PiggyBack: 1015 mL    norepinephrine Infusion: 72 mL    Oral Fluid: 75 mL    Osmolite: 160 mL    sodium chloride 0.9%.: 1087.5 mL  Total IN: 2439.5 mL    OUT:    Indwelling Catheter - Urethral: 835 mL  Total OUT: 835 mL    Total NET: 1604.5 mL      13 Feb 2018 07:01  -  13 Feb 2018 10:34  --------------------------------------------------------  IN:  Total IN: 0 mL    OUT:    Indwelling Catheter - Urethral: 500 mL  Total OUT: 500 mL    Total NET: -500 mL  Mode: Auto Mode: PRVC/ Volume Support  RR (machine): 14  TV (machine): 450  FiO2: 30  PEEP: 5  ITime: 1  MAP: 10  PIP: 25      PHYSICAL EXAM:    ABDOMEN: Soft, Nontender, Nondistended; + PEG TUBE IN PLACE SITE C/C/I    IV ACCESS:  FEEDING ACCESS: PEG TUBE    MEDICATIONS  (STANDING):  acetaminophen   Tablet. 650 milliGRAM(s) Oral every 6 hours  chlorhexidine 0.12% Liquid 15 milliLiter(s) Swish and Spit two times a day  dexmedetomidine Infusion 0.5 MICROgram(s)/kG/Hr (8 mL/Hr) IV Continuous <Continuous>  docusate sodium 100 milliGRAM(s) Oral three times a day  haloperidol     Tablet 0.5 milliGRAM(s) Oral daily  heparin  Injectable (Preservative-Free) 5000 Unit(s) SubCutaneous every 8 hours  influenza   Vaccine 0.5 milliLiter(s) IntraMuscular once  levothyroxine 75 MICROGram(s) Oral daily  midodrine 10 milliGRAM(s) Oral every 8 hours  norepinephrine Infusion 0.208 MICROgram(s)/kG/Min (25 mL/Hr) IV Continuous <Continuous>  nystatin Powder 1 Application(s) Topical every 12 hours  nystatin/triamcinolone Ointment 1 Application(s) Topical every 12 hours  pantoprazole  Injectable 40 milliGRAM(s) IV Push every 24 hours  senna 1 Tablet(s) Oral every 12 hours  sodium chloride 0.9%. 1000 milliLiter(s) (75 mL/Hr) IV Continuous <Continuous>    MEDICATIONS  (PRN):  acetaminophen  Suppository 650 milliGRAM(s) Rectal every 4 hours PRN For Temp greater than 38 C (100.4 F)  LORazepam   Injectable 1 milliGRAM(s) IV Push every 6 hours PRN Anxiety    LABS:                        10.0   6.93  )-----------( 255      ( 13 Feb 2018 06:18 )             31.1     02-13    138  |  109  |  10  ----------------------------<  81  3.5   |  26  |  0.6<L>    Ca    8.1<L>      13 Feb 2018 06:18  Mg     1.8     02-13    TPro  5.6<L>  /  Alb  2.1<L>  /  TBili  0.5  /  DBili  x   /  AST  31  /  ALT  27  /  AlkPhos  63  02-13    ABG - ( 13 Feb 2018 05:57 )  pH: 7.46  /  pCO2: 34    /  pO2: 92    / HCO3: 24    / Base Excess: 0.8   /  SaO2: 98          Current Diet: [  ]NPO  Nutritional Requirements  Carbohydrates: Total grams / kG / day: ___ Total Calories: ___  Proteins: Total grams / kG / day: ___ Total Calories: ___ HPI:  Patient is a 57y old  Female who presents with a chief complaint of hypoxia at NH. Pt had difficulty breathing after eating dinner, brought to ED. Pt previously hospitalized 1month ago for aspiration PNA. Previously couldn't tolerate barium swallow. Pt intubated and placed on pressors. Pt has been unable to tolerate levophed weaning and SBT. Midodrine started today.  s/p Trach and Peg tube feed  remain vented via trach  event noted  peg tube feed on hold sice 2/12/17 as per ccu team pt has abdominal pain at the site of the the peg tube when feeding and flushing   pt for possibly transfer north to the vent unit  on Peg tube feed: discontinued    Height (cm): 165.1 (02-09-18 @ 14:46) - PT MEASURED  CM ON ADMISSION  Weight (kg): 60.2 (02-10-18 @ 02:00)  ICU Vital Signs Last 24 Hrs  T(F): 98.5 (13 Feb 2018 09:00), Max: 99.1 (13 Feb 2018 01:00)  HR: 74 (13 Feb 2018 08:00) (57 - 101)  BP: 131/61 (13 Feb 2018 08:00) (99/51 - 131/61)  RR: 21 (13 Feb 2018 09:00) (14 - 33)  SpO2: 99% (13 Feb 2018 08:00) (96% - 100%)    I&O's Detail  12 Feb 2018 07:01  -  13 Feb 2018 07:00  --------------------------------------------------------  IN:    Enteral Tube Flush: 30 mL    IV PiggyBack: 1015 mL    norepinephrine Infusion: 72 mL    Oral Fluid: 75 mL    Osmolite: 160 mL    sodium chloride 0.9%.: 1087.5 mL  Total IN: 2439.5 mL    OUT:    Indwelling Catheter - Urethral: 835 mL  Total OUT: 835 mL    Total NET: 1604.5 mL      13 Feb 2018 07:01  -  13 Feb 2018 10:34  --------------------------------------------------------  IN:  Total IN: 0 mL    OUT:    Indwelling Catheter - Urethral: 500 mL  Total OUT: 500 mL    Total NET: -500 mL  Mode: Auto Mode: PRVC/ Volume Support  RR (machine): 14  TV (machine): 450  FiO2: 30  PEEP: 5  ITime: 1  MAP: 10  PIP: 25      PHYSICAL EXAM:  + trach, vent dependent, awake  and eyes follow me around room  ABDOMEN: Soft, Nontender, Nondistended; + PEG TUBE IN PLACE SITE C/C/I  FEEDING ACCESS: PEG TUBE    MEDICATIONS  (STANDING):  acetaminophen   Tablet. 650 milliGRAM(s) Oral every 6 hours  chlorhexidine 0.12% Liquid 15 milliLiter(s) Swish and Spit two times a day  dexmedetomidine Infusion 0.5 MICROgram(s)/kG/Hr (8 mL/Hr) IV Continuous <Continuous>  docusate sodium 100 milliGRAM(s) Oral three times a day  haloperidol     Tablet 0.5 milliGRAM(s) Oral daily  heparin  Injectable (Preservative-Free) 5000 Unit(s) SubCutaneous every 8 hours  influenza   Vaccine 0.5 milliLiter(s) IntraMuscular once  levothyroxine 75 MICROGram(s) Oral daily  midodrine 10 milliGRAM(s) Oral every 8 hours  norepinephrine Infusion 0.208 MICROgram(s)/kG/Min (25 mL/Hr) IV Continuous <Continuous>  nystatin Powder 1 Application(s) Topical every 12 hours  nystatin/triamcinolone Ointment 1 Application(s) Topical every 12 hours  pantoprazole  Injectable 40 milliGRAM(s) IV Push every 24 hours  senna 1 Tablet(s) Oral every 12 hours  sodium chloride 0.9%. 1000 milliLiter(s) (75 mL/Hr) IV Continuous <Continuous>    MEDICATIONS  (PRN):  acetaminophen  Suppository 650 milliGRAM(s) Rectal every 4 hours PRN For Temp greater than 38 C (100.4 F)  LORazepam   Injectable 1 milliGRAM(s) IV Push every 6 hours PRN Anxiety    LABS:                        10.0   6.93  )-----------( 255      ( 13 Feb 2018 06:18 )             31.1     02-13    138  |  109  |  10  ----------------------------<  81  3.5   |  26  |  0.6<L>    Ca    8.1<L>      13 Feb 2018 06:18  Mg     1.8     02-13    TPro  5.6<L>  /  Alb  2.1<L>  /  TBili  0.5  /  DBili  x   /  AST  31  /  ALT  27  /  AlkPhos  63  02-13    ABG - ( 13 Feb 2018 05:57 )  pH: 7.46  /  pCO2: 34    /  pO2: 92    / HCO3: 24    / Base Excess: 0.8   /  SaO2: 98

## 2018-02-13 NOTE — PROGRESS NOTE ADULT - SUBJECTIVE AND OBJECTIVE BOX
[id number]  Patient is a 57y old female presents with a chief complaint of hypoxia at NH 2/2 aspiration PNA.  Pt has been unable to tolerate levophed weaning and SBT. Midodrine started, Levophed being weaned. Pt s/p trach/peg        MEDICATIONS  (STANDING):  acetaminophen   Tablet. 650 milliGRAM(s) Oral every 6 hours  chlorhexidine 0.12% Liquid 15 milliLiter(s) Swish and Spit two times a day  dexmedetomidine Infusion 0.5 MICROgram(s)/kG/Hr (8 mL/Hr) IV Continuous <Continuous>  docusate sodium 100 milliGRAM(s) Oral three times a day  haloperidol     Tablet 0.5 milliGRAM(s) Oral daily  heparin  Injectable (Preservative-Free) 5000 Unit(s) SubCutaneous every 8 hours  influenza   Vaccine 0.5 milliLiter(s) IntraMuscular once  levothyroxine 75 MICROGram(s) Oral daily  midodrine 10 milliGRAM(s) Oral every 8 hours  norepinephrine Infusion 0.208 MICROgram(s)/kG/Min (25 mL/Hr) IV Continuous <Continuous>  nystatin Powder 1 Application(s) Topical every 12 hours  nystatin/triamcinolone Ointment 1 Application(s) Topical every 12 hours  pantoprazole  Injectable 40 milliGRAM(s) IV Push every 24 hours  senna 1 Tablet(s) Oral every 12 hours  sodium chloride 0.9%. 1000 milliLiter(s) (75 mL/Hr) IV Continuous <Continuous>    MEDICATIONS  (PRN):  acetaminophen  Suppository 650 milliGRAM(s) Rectal every 4 hours PRN For Temp greater than 38 C (100.4 F)  LORazepam   Injectable 1 milliGRAM(s) IV Push every 6 hours PRN Anxiety    No Known Allergies      Overnight events:    Vital Signs Last 24 Hrs  T(C): 36.9 (13 Feb 2018 09:00), Max: 37.3 (13 Feb 2018 01:00)  T(F): 98.5 (13 Feb 2018 09:00), Max: 99.1 (13 Feb 2018 01:00)  HR: 74 (13 Feb 2018 08:00) (57 - 101)  BP: 101/57 (13 Feb 2018 11:11) (99/51 - 131/61)  BP(mean): 73 (13 Feb 2018 11:11) (70 - 90)  RR: 17 (13 Feb 2018 11:00) (14 - 33)  SpO2: 99% (13 Feb 2018 08:00) (96% - 100%)  CAPILLARY BLOOD GLUCOSE        I&O's Summary    12 Feb 2018 07:01  -  13 Feb 2018 07:00  --------------------------------------------------------  IN: 2439.5 mL / OUT: 835 mL / NET: 1604.5 mL    13 Feb 2018 07:01  -  13 Feb 2018 11:28  --------------------------------------------------------  IN: 0 mL / OUT: 625 mL / NET: -625 mL          PHYSICAL EXAM:  GENERAL: NAD, speaks in full sentences, no signs of respiratory distress-pt grimacing to light palpation globally  HEAD:  Atraumatic, Normocephalic  EYES: EOMI, PERRLA, conjunctiva and sclera clear  NECK: Supple, No JVD  CHEST/LUNG: Clear to auscultation bilaterally; No wheeze; No crackles; No accessory muscles used  HEART: Regular rate and rhythm; No murmurs;   ABDOMEN: Soft, Nontender, Nondistended; Bowel sounds present; No guarding  EXTREMITIES:  2+ Peripheral Pulses, No cyanosis or edema  PSYCH: AAOx3  NEUROLOGY: non-focal  SKIN: No rashes or lesions    Labs:                        10.0   6.93  )-----------( 255      ( 13 Feb 2018 06:18 )             31.1             02-13    138  |  109  |  10  ----------------------------<  81  3.5   |  26  |  0.6<L>    Ca    8.1<L>      13 Feb 2018 06:18  Mg     1.8     02-13    TPro  5.6<L>  /  Alb  2.1<L>  /  TBili  0.5  /  DBili  x   /  AST  31  /  ALT  27  /  AlkPhos  63  02-13    LIVER FUNCTIONS - ( 13 Feb 2018 06:18 )  Alb: 2.1 g/dL / Pro: 5.6 g/dL / ALK PHOS: 63 U/L / ALT: 27 U/L / AST: 31 U/L / GGT: x                       ABG - ( 13 Feb 2018 05:57 )  pH: 7.46  /  pCO2: 34    /  pO2: 92    / HCO3: 24    / Base Excess: 0.8   /  SaO2: 98                      Mode: Auto Mode: PRVC/ Volume Support  RR (machine): 14  TV (machine): 450  FiO2: 30  PEEP: 5  ITime: 1  MAP: 10  PIP: 25      Imaging:    ECG:

## 2018-02-13 NOTE — PROGRESS NOTE ADULT - ASSESSMENT
IMPRESSION:  1-ARF  2-Asp pneumonia   3-septic shock resolved      PLAN:    CNS: sedation as needed     HEENT:  oral care   PULMONARY: keep same vent managment try cpap as much she can tolerate   Asp precaution   CARDIOVASCULAR: keep Is =OS     GI: GI prophylaxis. peg feeding     RENAL: follow lytes     INFECTIOUS DISEASE: s/p ABX    HEMATOLOGICAL:  DVT prophylaxis.    ENDOCRINE:  Follow up FS.  Insulin protocol if needed.

## 2018-02-13 NOTE — PROGRESS NOTE ADULT - SUBJECTIVE AND OBJECTIVE BOX
Over Night Events:  Patient seen and examined back on small dose of levophid   no other events        ROS:  See HPI    PHYSICAL EXAM    ICU Vital Signs Last 24 Hrs  T(C): 36.9 (13 Feb 2018 09:00), Max: 37.3 (13 Feb 2018 01:00)  T(F): 98.5 (13 Feb 2018 09:00), Max: 99.1 (13 Feb 2018 01:00)  HR: 74 (13 Feb 2018 08:00) (57 - 101)  BP: 131/61 (13 Feb 2018 08:00) (99/51 - 131/61)  BP(mean): 78 (13 Feb 2018 08:00) (70 - 90)  ABP: --  ABP(mean): --  RR: 21 (13 Feb 2018 09:00) (14 - 33)  SpO2: 99% (13 Feb 2018 08:00) (96% - 100%)      General: Awake   HEENT:   tracheostomy              Lymph Nodes: NO cervical LN   Lungs: Bilateral BS  Cardiovascular: Regular   Abdomen: Soft, Positive B peg tube   Extremities: No clubbing   Skin:   Neurological: move all ext      I&O's Detail    12 Feb 2018 07:01  -  13 Feb 2018 07:00  --------------------------------------------------------  IN:    Enteral Tube Flush: 30 mL    IV PiggyBack: 1015 mL    norepinephrine Infusion: 72 mL    Oral Fluid: 75 mL    Osmolite: 160 mL    sodium chloride 0.9%.: 1087.5 mL  Total IN: 2439.5 mL    OUT:    Indwelling Catheter - Urethral: 835 mL  Total OUT: 835 mL    Total NET: 1604.5 mL      13 Feb 2018 07:01  -  13 Feb 2018 10:09  --------------------------------------------------------  IN:  Total IN: 0 mL    OUT:    Indwelling Catheter - Urethral: 500 mL  Total OUT: 500 mL    Total NET: -500 mL          LABS:                          10.0   6.93  )-----------( 255      ( 13 Feb 2018 06:18 )             31.1         13 Feb 2018 06:18    138    |  109    |  10     ----------------------------<  81     3.5     |  26     |  0.6      Ca    8.1        13 Feb 2018 06:18  Mg     1.8       13 Feb 2018 06:18    TPro  5.6    /  Alb  2.1    /  TBili  0.5    /  DBili  x      /  AST  31     /  ALT  27     /  AlkPhos  63     13 Feb 2018 06:18  Amylase x     lipase x                                                                                                                                                                                                 Mode: Auto Mode: PRVC/ Volume Support  RR (machine): 14  TV (machine): 450  FiO2: 30  PEEP: 5  ITime: 1  MAP: 10  PIP: 25                                      ABG - ( 13 Feb 2018 05:57 )  pH: 7.46  /  pCO2: 34    /  pO2: 92    / HCO3: 24    / Base Excess: 0.8   /  SaO2: 98                  MEDICATIONS  (STANDING):  acetaminophen   Tablet. 650 milliGRAM(s) Oral every 6 hours  chlorhexidine 0.12% Liquid 15 milliLiter(s) Swish and Spit two times a day  dexmedetomidine Infusion 0.5 MICROgram(s)/kG/Hr (8 mL/Hr) IV Continuous <Continuous>  docusate sodium 100 milliGRAM(s) Oral three times a day  heparin  Injectable (Preservative-Free) 5000 Unit(s) SubCutaneous every 8 hours  influenza   Vaccine 0.5 milliLiter(s) IntraMuscular once  levothyroxine 75 MICROGram(s) Oral daily  midodrine 10 milliGRAM(s) Oral every 8 hours  norepinephrine Infusion 0.208 MICROgram(s)/kG/Min (25 mL/Hr) IV Continuous <Continuous>  nystatin Powder 1 Application(s) Topical every 12 hours  nystatin/triamcinolone Ointment 1 Application(s) Topical every 12 hours  pantoprazole  Injectable 40 milliGRAM(s) IV Push every 24 hours  senna 1 Tablet(s) Oral every 12 hours  sodium chloride 0.9%. 1000 milliLiter(s) (75 mL/Hr) IV Continuous <Continuous>    MEDICATIONS  (PRN):  acetaminophen  Suppository 650 milliGRAM(s) Rectal every 4 hours PRN For Temp greater than 38 C (100.4 F)  LORazepam   Injectable 1 milliGRAM(s) IV Push every 6 hours PRN Anxiety          Xrays:  TLC:  OG:  ET tube:                                                                                       ECHO:    IMPRESSION:      PLAN:    CNS:    HEENT:    PULMONARY:    CARDIOVASCULAR:    GI: GI prophylaxis.  Feeding     RENAL:    INFECTIOUS DISEASE:    HEMATOLOGICAL:  DVT prophylaxis.    ENDOCRINE:  Follow up FS.  Insulin protocol if needed.    MUSCULOSKELETAL:

## 2018-02-13 NOTE — PROGRESS NOTE ADULT - PROBLEM SELECTOR PROBLEM 4
Dysphagia, unspecified type
Aspiration pneumonia, unspecified aspiration pneumonia type, unspecified laterality, unspecified part of lung
Hypotension, unspecified hypotension type
Hypotension, unspecified hypotension type
Sepsis
Sepsis
Aspiration pneumonia, unspecified aspiration pneumonia type, unspecified laterality, unspecified part of lung
Aspiration pneumonia, unspecified aspiration pneumonia type, unspecified laterality, unspecified part of lung
Hypotension, unspecified hypotension type
Aspiration pneumonia, unspecified aspiration pneumonia type, unspecified laterality, unspecified part of lung

## 2018-02-13 NOTE — PROGRESS NOTE ADULT - PROBLEM SELECTOR PLAN 3
synthroid
-SBT, wean off vent as tolerated
finished antibiotic course  -s/p peg  surgical follow up today for some tenderness around peg site  analgesia prn
finished antibiotic course  -s/p peg  surgical follow up today for some tenderness around peg site  analgesia prn
synthroid
synthroid
-Peg possible today
-SBT, wean off vent as tolerated
-SBT, wean off vent as tolerated
-s/p peg
-s/p peg
-SBT, wean off vent as tolerated

## 2018-02-14 RX ADMIN — HALOPERIDOL DECANOATE 0.5 MILLIGRAM(S): 100 INJECTION INTRAMUSCULAR at 12:43

## 2018-02-14 RX ADMIN — CHLORHEXIDINE GLUCONATE 15 MILLILITER(S): 213 SOLUTION TOPICAL at 05:29

## 2018-02-14 RX ADMIN — Medication 650 MILLIGRAM(S): at 12:42

## 2018-02-14 RX ADMIN — Medication 650 MILLIGRAM(S): at 18:14

## 2018-02-14 RX ADMIN — HEPARIN SODIUM 5000 UNIT(S): 5000 INJECTION INTRAVENOUS; SUBCUTANEOUS at 21:30

## 2018-02-14 RX ADMIN — NYSTATIN CREAM 1 APPLICATION(S): 100000 CREAM TOPICAL at 05:28

## 2018-02-14 RX ADMIN — Medication 650 MILLIGRAM(S): at 18:11

## 2018-02-14 RX ADMIN — Medication 650 MILLIGRAM(S): at 05:33

## 2018-02-14 RX ADMIN — HEPARIN SODIUM 5000 UNIT(S): 5000 INJECTION INTRAVENOUS; SUBCUTANEOUS at 05:28

## 2018-02-14 RX ADMIN — Medication 1 APPLICATION(S): at 18:12

## 2018-02-14 RX ADMIN — PANTOPRAZOLE SODIUM 40 MILLIGRAM(S): 20 TABLET, DELAYED RELEASE ORAL at 18:14

## 2018-02-14 RX ADMIN — SENNA PLUS 1 TABLET(S): 8.6 TABLET ORAL at 18:12

## 2018-02-14 RX ADMIN — SODIUM CHLORIDE 75 MILLILITER(S): 9 INJECTION INTRAMUSCULAR; INTRAVENOUS; SUBCUTANEOUS at 05:34

## 2018-02-14 RX ADMIN — MIDODRINE HYDROCHLORIDE 10 MILLIGRAM(S): 2.5 TABLET ORAL at 05:29

## 2018-02-14 RX ADMIN — MIDODRINE HYDROCHLORIDE 10 MILLIGRAM(S): 2.5 TABLET ORAL at 18:11

## 2018-02-14 RX ADMIN — SODIUM CHLORIDE 75 MILLILITER(S): 9 INJECTION INTRAMUSCULAR; INTRAVENOUS; SUBCUTANEOUS at 18:15

## 2018-02-14 RX ADMIN — MIDODRINE HYDROCHLORIDE 10 MILLIGRAM(S): 2.5 TABLET ORAL at 12:43

## 2018-02-14 RX ADMIN — Medication 650 MILLIGRAM(S): at 14:45

## 2018-02-14 RX ADMIN — CHLORHEXIDINE GLUCONATE 15 MILLILITER(S): 213 SOLUTION TOPICAL at 18:11

## 2018-02-14 RX ADMIN — Medication 75 MICROGRAM(S): at 05:31

## 2018-02-14 RX ADMIN — NYSTATIN CREAM 1 APPLICATION(S): 100000 CREAM TOPICAL at 18:11

## 2018-02-14 RX ADMIN — HEPARIN SODIUM 5000 UNIT(S): 5000 INJECTION INTRAVENOUS; SUBCUTANEOUS at 14:44

## 2018-02-14 RX ADMIN — SENNA PLUS 1 TABLET(S): 8.6 TABLET ORAL at 05:28

## 2018-02-14 NOTE — PROGRESS NOTE ADULT - SUBJECTIVE AND OBJECTIVE BOX
Patient is a 57y old  Female who presents with a chief complaint of   SEPTIC SHOCK  ^SEPTIC SHOCK  No h/o HF  Handoff  Pneumonia, Organism NOS  MEWS Score  Aspiration into airway, sequela  Aspiration into airway, sequela  Dysphagia, unspecified type  Hypotension, unspecified hypotension type  Sepsis, due to unspecified organism  Aspiration pneumonia, unspecified aspiration pneumonia type, unspecified laterality, unspecified part of lung  Acute respiratory failure with hypoxia  Hypothyroidism, unspecified type  Sepsis  Hypothyroid  Pneumonia  Respiratory failure  Tracheostomy  PEG (percutaneous endoscopic gastrostomy)  0  SEPTIC SHOCK [Active]  Aspiration into airway, sequela [Active]  Aspiration into airway, sequela [Active]  Dysphagia, unspecified type [Active]  Hypotension, unspecified hypotension type [Active]  Sepsis, due to unspecified organism [Active]  Aspiration pneumonia, unspecified aspiration pneumonia type, unspecified laterality, unspecified part of lung [Active]  Acute respiratory failure with hypoxia [Active]  Hypothyroidism, unspecified type [Active]  Sepsis [Active]  Hypothyroid [Active]  Pneumonia [Active]  Respiratory failure [Active]  Tracheostomy [Active]  PEG (percutaneous endoscopic gastrostomy) [Active]  0 [Active]          HOSPITAL DAY NO:       Vital Signs Last 24 Hrs  T(C): 36.5 (14 Feb 2018 02:30), Max: 37.1 (13 Feb 2018 15:01)  T(F): 97.7 (14 Feb 2018 02:30), Max: 98.8 (13 Feb 2018 15:01)  HR: 56 (14 Feb 2018 04:03) (56 - 91)  BP: 103/50 (14 Feb 2018 02:30) (99/66 - 149/97)  BP(mean): 87 (14 Feb 2018 01:12) (71 - 114)  RR: 26 (14 Feb 2018 01:17) (17 - 40)  SpO2: 97% (14 Feb 2018 04:03) (97% - 100%)    use of pressors: Y _x__  N ____    use of sedation: Y ___  N ___x_    Vent dependent: Y__x__  N ____    Mode: AC/ CMV (Assist Control/ Continuous Mandatory Ventilation)  RR (machine): 14  TV (machine): 450  FiO2: 30  PEEP: 5  MAP: 17  PIP: 34                                      ABG - ( 13 Feb 2018 05:57 )  pH: 7.46  /  pCO2: 34    /  pO2: 92    / HCO3: 24    / Base Excess: 0.8   /  SaO2: 98                  Weaning time:     Significant exam findings:   MS:  CHEST: B/L breath sounds   ABDOMEN: soft   HEART:S1/S2 regular  SKIN:     No of BMs:       Nutrition:                    02-13-18 @ 07:01  -  02-14-18 @ 07:00  --------------------------------------------------------  IN:    Osmolite: 250 mL  Total IN: 250 mL          LABS:                          10.0   6.93  )-----------( 255      ( 13 Feb 2018 06:18 )             31.1                                               02-13    138  |  109  |  10  ----------------------------<  81  3.5   |  26  |  0.6<L>    Ca    8.1<L>      13 Feb 2018 06:18  Mg     1.8     02-13    TPro  5.6<L>  /  Alb  2.1<L>  /  TBili  0.5  /  DBili  x   /  AST  31  /  ALT  27  /  AlkPhos  63  02-13                                                LIVER FUNCTIONS - ( 13 Feb 2018 06:18 )  Alb: 2.1 g/dL / Pro: 5.6 g/dL / ALK PHOS: 63 U/L / ALT: 27 U/L / AST: 31 U/L / GGT: x                                                                                                   MEDICATIONS  (STANDING):  acetaminophen   Tablet. 650 milliGRAM(s) Oral every 6 hours  chlorhexidine 0.12% Liquid 15 milliLiter(s) Swish and Spit two times a day  chlorhexidine 0.12% Liquid 15 milliLiter(s) Swish and Spit two times a day  haloperidol     Tablet 0.5 milliGRAM(s) Oral daily  heparin  Injectable 5000 Unit(s) SubCutaneous every 8 hours  levothyroxine 75 MICROGram(s) Oral daily  midodrine 10 milliGRAM(s) Oral every 8 hours  nystatin Powder 1 Application(s) Topical every 12 hours  nystatin/triamcinolone Ointment 1 Application(s) Topical every 12 hours  pantoprazole  Injectable 40 milliGRAM(s) IV Push every 24 hours  senna 1 Tablet(s) Oral every 12 hours  sodium chloride 0.9%. 1000 milliLiter(s) (75 mL/Hr) IV Continuous <Continuous>    MEDICATIONS  (PRN):  acetaminophen  Suppository 650 milliGRAM(s) Rectal every 4 hours PRN For Temp greater than 38 C (100.4 F)  LORazepam   Injectable 1 milliGRAM(s) IV Push every 6 hours PRN Anxiety      02-13-18 @ 07:01  -  02-14-18 @ 07:00  --------------------------------------------------------  IN: 0.01 mL/kg/hr / OUT: 0 mL/kg/hr / NET: 0.01 mL/kg/hr        Case discussed with staff and family at the bedside Patient is a 57y old  Female who presents with a chief complaint of   SEPTIC SHOCK  ^SEPTIC SHOCK  No h/o HF  Handoff  Pneumonia, Organism NOS  MEWS Score  Aspiration into airway, sequela  Aspiration into airway, sequela  Dysphagia, unspecified type  Hypotension, unspecified hypotension type  Sepsis, due to unspecified organism  Aspiration pneumonia, unspecified aspiration pneumonia type, unspecified laterality, unspecified part of lung  Acute respiratory failure with hypoxia  Hypothyroidism, unspecified type  Sepsis  Hypothyroid  Pneumonia  Respiratory failure  Tracheostomy  PEG (percutaneous endoscopic gastrostomy)  0  SEPTIC SHOCK [Active]  Aspiration into airway, sequela [Active]  Aspiration into airway, sequela [Active]  Dysphagia, unspecified type [Active]  Hypotension, unspecified hypotension type [Active]  Sepsis, due to unspecified organism [Active]  Aspiration pneumonia, unspecified aspiration pneumonia type, unspecified laterality, unspecified part of lung [Active]  Acute respiratory failure with hypoxia [Active]  Hypothyroidism, unspecified type [Active]  Sepsis [Active]  Hypothyroid [Active]  Pneumonia [Active]  Respiratory failure [Active]  Tracheostomy [Active]  PEG (percutaneous endoscopic gastrostomy) [Active]  0 [Active]          HOSPITAL DAY NO:       Vital Signs Last 24 Hrs  T(C): 36.5 (14 Feb 2018 02:30), Max: 37.1 (13 Feb 2018 15:01)  T(F): 97.7 (14 Feb 2018 02:30), Max: 98.8 (13 Feb 2018 15:01)  HR: 56 (14 Feb 2018 04:03) (56 - 91)  BP: 103/50 (14 Feb 2018 02:30) (99/66 - 149/97)  BP(mean): 87 (14 Feb 2018 01:12) (71 - 114)  RR: 26 (14 Feb 2018 01:17) (17 - 40)  SpO2: 97% (14 Feb 2018 04:03) (97% - 100%)    use of pressors: Y _x__  N ____    use of sedation: Y ___  N ___x_    Vent dependent: Y__x__  N ____    Mode: AC/ CMV (Assist Control/ Continuous Mandatory Ventilation)  RR (machine): 14  TV (machine): 450  FiO2: 30  PEEP: 5  MAP: 17  PIP: 34                                      ABG - ( 13 Feb 2018 05:57 )  pH: 7.46  /  pCO2: 34    /  pO2: 92    / HCO3: 24    / Base Excess: 0.8   /  SaO2: 98                  Weaning time:     Significant exam findings:   MS: alert and restless, does follow simple commands  CHEST: B/L breath sounds   ABDOMEN: soft   HEART:S1/S2 regular  SKIN: intact     No of BMs: no diarrohea      Nutrition:                    02-13-18 @ 07:01  -  02-14-18 @ 07:00  --------------------------------------------------------  IN:    Osmolite: 250 mL  Total IN: 250 mL          LABS:                          10.0   6.93  )-----------( 255      ( 13 Feb 2018 06:18 )             31.1                                               02-13    138  |  109  |  10  ----------------------------<  81  3.5   |  26  |  0.6<L>    Ca    8.1<L>      13 Feb 2018 06:18  Mg     1.8     02-13    TPro  5.6<L>  /  Alb  2.1<L>  /  TBili  0.5  /  DBili  x   /  AST  31  /  ALT  27  /  AlkPhos  63  02-13                                                LIVER FUNCTIONS - ( 13 Feb 2018 06:18 )  Alb: 2.1 g/dL / Pro: 5.6 g/dL / ALK PHOS: 63 U/L / ALT: 27 U/L / AST: 31 U/L / GGT: x                                                                                                   MEDICATIONS  (STANDING):  acetaminophen   Tablet. 650 milliGRAM(s) Oral every 6 hours  chlorhexidine 0.12% Liquid 15 milliLiter(s) Swish and Spit two times a day  chlorhexidine 0.12% Liquid 15 milliLiter(s) Swish and Spit two times a day  haloperidol     Tablet 0.5 milliGRAM(s) Oral daily  heparin  Injectable 5000 Unit(s) SubCutaneous every 8 hours  levothyroxine 75 MICROGram(s) Oral daily  midodrine 10 milliGRAM(s) Oral every 8 hours  nystatin Powder 1 Application(s) Topical every 12 hours  nystatin/triamcinolone Ointment 1 Application(s) Topical every 12 hours  pantoprazole  Injectable 40 milliGRAM(s) IV Push every 24 hours  senna 1 Tablet(s) Oral every 12 hours  sodium chloride 0.9%. 1000 milliLiter(s) (75 mL/Hr) IV Continuous <Continuous>    MEDICATIONS  (PRN):  acetaminophen  Suppository 650 milliGRAM(s) Rectal every 4 hours PRN For Temp greater than 38 C (100.4 F)  LORazepam   Injectable 1 milliGRAM(s) IV Push every 6 hours PRN Anxiety      02-13-18 @ 07:01  -  02-14-18 @ 07:00  --------------------------------------------------------  IN: 0.01 mL/kg/hr / OUT: 0 mL/kg/hr / NET: 0.01 mL/kg/hr        Case discussed with staff and family at the bedside

## 2018-02-15 LAB
HCT VFR BLD CALC: 32.6 % — LOW (ref 37–47)
HGB BLD-MCNC: 10.7 G/DL — LOW (ref 14–18)
MCHC RBC-ENTMCNC: 32.8 G/DL — SIGNIFICANT CHANGE UP (ref 32–37)
MCHC RBC-ENTMCNC: 33 PG — HIGH (ref 27–31)
MCV RBC AUTO: 100.6 FL — HIGH (ref 81–91)
NRBC # BLD: 0 /100 WBCS — SIGNIFICANT CHANGE UP (ref 0–0)
PLATELET # BLD AUTO: 278 K/UL — SIGNIFICANT CHANGE UP (ref 130–400)
RBC # BLD: 3.24 M/UL — LOW (ref 4.2–5.4)
RBC # FLD: 17 % — HIGH (ref 11.5–14.5)
WBC # BLD: 9.71 K/UL — SIGNIFICANT CHANGE UP (ref 4.8–10.8)
WBC # FLD AUTO: 9.71 K/UL — SIGNIFICANT CHANGE UP (ref 4.8–10.8)

## 2018-02-15 RX ADMIN — HEPARIN SODIUM 5000 UNIT(S): 5000 INJECTION INTRAVENOUS; SUBCUTANEOUS at 21:19

## 2018-02-15 RX ADMIN — Medication 75 MICROGRAM(S): at 05:18

## 2018-02-15 RX ADMIN — NYSTATIN CREAM 1 APPLICATION(S): 100000 CREAM TOPICAL at 05:20

## 2018-02-15 RX ADMIN — Medication 650 MILLIGRAM(S): at 17:39

## 2018-02-15 RX ADMIN — Medication 650 MILLIGRAM(S): at 05:21

## 2018-02-15 RX ADMIN — CHLORHEXIDINE GLUCONATE 15 MILLILITER(S): 213 SOLUTION TOPICAL at 05:16

## 2018-02-15 RX ADMIN — HEPARIN SODIUM 5000 UNIT(S): 5000 INJECTION INTRAVENOUS; SUBCUTANEOUS at 12:54

## 2018-02-15 RX ADMIN — NYSTATIN CREAM 1 APPLICATION(S): 100000 CREAM TOPICAL at 17:38

## 2018-02-15 RX ADMIN — Medication 1 APPLICATION(S): at 17:38

## 2018-02-15 RX ADMIN — Medication 650 MILLIGRAM(S): at 05:22

## 2018-02-15 RX ADMIN — PANTOPRAZOLE SODIUM 40 MILLIGRAM(S): 20 TABLET, DELAYED RELEASE ORAL at 12:53

## 2018-02-15 RX ADMIN — Medication 650 MILLIGRAM(S): at 01:20

## 2018-02-15 RX ADMIN — MIDODRINE HYDROCHLORIDE 10 MILLIGRAM(S): 2.5 TABLET ORAL at 12:53

## 2018-02-15 RX ADMIN — Medication 650 MILLIGRAM(S): at 12:52

## 2018-02-15 RX ADMIN — MIDODRINE HYDROCHLORIDE 10 MILLIGRAM(S): 2.5 TABLET ORAL at 17:38

## 2018-02-15 RX ADMIN — HALOPERIDOL DECANOATE 0.5 MILLIGRAM(S): 100 INJECTION INTRAMUSCULAR at 12:52

## 2018-02-15 RX ADMIN — MIDODRINE HYDROCHLORIDE 10 MILLIGRAM(S): 2.5 TABLET ORAL at 05:19

## 2018-02-15 RX ADMIN — Medication 1 APPLICATION(S): at 05:21

## 2018-02-15 RX ADMIN — HEPARIN SODIUM 5000 UNIT(S): 5000 INJECTION INTRAVENOUS; SUBCUTANEOUS at 05:19

## 2018-02-15 RX ADMIN — CHLORHEXIDINE GLUCONATE 15 MILLILITER(S): 213 SOLUTION TOPICAL at 17:39

## 2018-02-15 RX ADMIN — Medication 650 MILLIGRAM(S): at 12:54

## 2018-02-15 NOTE — PROGRESS NOTE ADULT - ASSESSMENT
IMPRESSION:  1-ARF  2-Asp pneumonia   3-septic shock resolved      PLAN:    CNS: sedation as needed     HEENT:  oral care   PULMONARY: keep same vent managment try cpap as much she can tolerate   Asp precaution   CARDIOVASCULAR: keep Is =OS     GI: GI prophylaxis. peg feeding     RENAL: follow lytes   repeat labs    INFECTIOUS DISEASE:  off ABX    HEMATOLOGICAL:  DVT prophylaxis.    ENDOCRINE:  Follow up FS.  Insulin protocol if needed.

## 2018-02-15 NOTE — PROGRESS NOTE ADULT - SUBJECTIVE AND OBJECTIVE BOX
Patient is a 57y old  Female who presents with a chief complaint of   SEPTIC SHOCK  ^SEPTIC SHOCK  No h/o HF  Handoff  Pneumonia, Organism NOS  MEWS Score  Aspiration into airway, sequela  Aspiration into airway, sequela  Dysphagia, unspecified type  Hypotension, unspecified hypotension type  Sepsis, due to unspecified organism  Aspiration pneumonia, unspecified aspiration pneumonia type, unspecified laterality, unspecified part of lung  Acute respiratory failure with hypoxia  Hypothyroidism, unspecified type  Sepsis  Hypothyroid  Pneumonia  Respiratory failure  Tracheostomy  PEG (percutaneous endoscopic gastrostomy)  0  SEPTIC SHOCK [Active]  Aspiration into airway, sequela [Active]  Aspiration into airway, sequela [Active]  Dysphagia, unspecified type [Active]  Hypotension, unspecified hypotension type [Active]  Sepsis, due to unspecified organism [Active]  Aspiration pneumonia, unspecified aspiration pneumonia type, unspecified laterality, unspecified part of lung [Active]  Acute respiratory failure with hypoxia [Active]  Hypothyroidism, unspecified type [Active]  Sepsis [Active]  Hypothyroid [Active]  Pneumonia [Active]  Respiratory failure [Active]  Tracheostomy [Active]  PEG (percutaneous endoscopic gastrostomy) [Active]  0 [Active]          HOSPITAL DAY NO: x      Vital Signs Last 24 Hrs  T(C): 37.1 (15 Feb 2018 07:56), Max: 37.1 (15 Feb 2018 00:04)  T(F): 98.8 (15 Feb 2018 07:56), Max: 98.8 (15 Feb 2018 07:56)  HR: 80 (15 Feb 2018 07:56) (79 - 95)  BP: 134/60 (15 Feb 2018 07:56) (98/51 - 134/60)  BP(mean): 87 (15 Feb 2018 07:56) (68 - 87)  RR: 26 (15 Feb 2018 07:56) (20 - 26)  SpO2: 97% (15 Feb 2018 01:30) (96% - 98%)    use of pressors: Y ___  N __x__    use of sedation: Y ___  N ___x_    Vent dependent: Y_x___  N ____    Mode: AC/ CMV (Assist Control/ Continuous Mandatory Ventilation)  RR (machine): 14  TV (machine): 450  FiO2: 40  PEEP: 5  MAP: 11  PIP: 32                                          Weaning time: none    Significant exam findings:   MS: unchanged   CHEST: B/L breath sounds   ABDOMEN: soft   HEART:S1/S2 regular  SKIN: unchanged     No of BMs:       Nutrition:                    02-14-18 @ 07:01  -  02-15-18 @ 07:00  --------------------------------------------------------  IN:    Osmolite: 950 mL  Total IN: 950 mL          LABS:                                                                                                                                                                                            MEDICATIONS  (STANDING):  acetaminophen   Tablet. 650 milliGRAM(s) Oral every 6 hours  chlorhexidine 0.12% Liquid 15 milliLiter(s) Swish and Spit two times a day  chlorhexidine 0.12% Liquid 15 milliLiter(s) Swish and Spit two times a day  haloperidol     Tablet 0.5 milliGRAM(s) Oral daily  heparin  Injectable 5000 Unit(s) SubCutaneous every 8 hours  levothyroxine 75 MICROGram(s) Oral daily  midodrine 10 milliGRAM(s) Oral every 8 hours  nystatin Powder 1 Application(s) Topical every 12 hours  nystatin/triamcinolone Ointment 1 Application(s) Topical every 12 hours  pantoprazole  Injectable 40 milliGRAM(s) IV Push every 24 hours  senna 1 Tablet(s) Oral every 12 hours  sodium chloride 0.9%. 1000 milliLiter(s) (75 mL/Hr) IV Continuous <Continuous>    MEDICATIONS  (PRN):  acetaminophen  Suppository 650 milliGRAM(s) Rectal every 4 hours PRN For Temp greater than 38 C (100.4 F)  LORazepam   Injectable 1 milliGRAM(s) IV Push every 6 hours PRN Anxiety          Case discussed with staff and family at the bedside

## 2018-02-16 LAB
ANION GAP SERPL CALC-SCNC: 6 MMOL/L — LOW (ref 7–14)
BUN SERPL-MCNC: 5 MG/DL — LOW (ref 10–20)
CALCIUM SERPL-MCNC: 8 MG/DL — LOW (ref 8.5–10.1)
CHLORIDE SERPL-SCNC: 107 MMOL/L — SIGNIFICANT CHANGE UP (ref 98–110)
CO2 SERPL-SCNC: 27 MMOL/L — SIGNIFICANT CHANGE UP (ref 17–32)
CREAT SERPL-MCNC: 0.6 MG/DL — LOW (ref 0.7–1.5)
GLUCOSE SERPL-MCNC: 111 MG/DL — HIGH (ref 70–110)
POTASSIUM SERPL-MCNC: 3.7 MMOL/L — SIGNIFICANT CHANGE UP (ref 3.5–5)
POTASSIUM SERPL-SCNC: 3.7 MMOL/L — SIGNIFICANT CHANGE UP (ref 3.5–5)
SODIUM SERPL-SCNC: 140 MMOL/L — SIGNIFICANT CHANGE UP (ref 135–146)

## 2018-02-16 RX ADMIN — Medication 650 MILLIGRAM(S): at 19:11

## 2018-02-16 RX ADMIN — HEPARIN SODIUM 5000 UNIT(S): 5000 INJECTION INTRAVENOUS; SUBCUTANEOUS at 13:26

## 2018-02-16 RX ADMIN — Medication 1 APPLICATION(S): at 05:11

## 2018-02-16 RX ADMIN — HALOPERIDOL DECANOATE 0.5 MILLIGRAM(S): 100 INJECTION INTRAMUSCULAR at 12:34

## 2018-02-16 RX ADMIN — Medication 650 MILLIGRAM(S): at 05:21

## 2018-02-16 RX ADMIN — SENNA PLUS 1 TABLET(S): 8.6 TABLET ORAL at 05:11

## 2018-02-16 RX ADMIN — Medication 650 MILLIGRAM(S): at 18:13

## 2018-02-16 RX ADMIN — Medication 650 MILLIGRAM(S): at 12:34

## 2018-02-16 RX ADMIN — HEPARIN SODIUM 5000 UNIT(S): 5000 INJECTION INTRAVENOUS; SUBCUTANEOUS at 05:10

## 2018-02-16 RX ADMIN — SENNA PLUS 1 TABLET(S): 8.6 TABLET ORAL at 18:12

## 2018-02-16 RX ADMIN — MIDODRINE HYDROCHLORIDE 10 MILLIGRAM(S): 2.5 TABLET ORAL at 12:35

## 2018-02-16 RX ADMIN — HEPARIN SODIUM 5000 UNIT(S): 5000 INJECTION INTRAVENOUS; SUBCUTANEOUS at 21:10

## 2018-02-16 RX ADMIN — Medication 75 MICROGRAM(S): at 05:11

## 2018-02-16 RX ADMIN — NYSTATIN CREAM 1 APPLICATION(S): 100000 CREAM TOPICAL at 05:11

## 2018-02-16 RX ADMIN — NYSTATIN CREAM 1 APPLICATION(S): 100000 CREAM TOPICAL at 18:11

## 2018-02-16 RX ADMIN — CHLORHEXIDINE GLUCONATE 15 MILLILITER(S): 213 SOLUTION TOPICAL at 18:07

## 2018-02-16 RX ADMIN — Medication 650 MILLIGRAM(S): at 00:06

## 2018-02-16 RX ADMIN — MIDODRINE HYDROCHLORIDE 10 MILLIGRAM(S): 2.5 TABLET ORAL at 05:11

## 2018-02-16 RX ADMIN — CHLORHEXIDINE GLUCONATE 15 MILLILITER(S): 213 SOLUTION TOPICAL at 05:08

## 2018-02-16 RX ADMIN — PANTOPRAZOLE SODIUM 40 MILLIGRAM(S): 20 TABLET, DELAYED RELEASE ORAL at 13:25

## 2018-02-16 RX ADMIN — MIDODRINE HYDROCHLORIDE 10 MILLIGRAM(S): 2.5 TABLET ORAL at 18:10

## 2018-02-16 RX ADMIN — Medication 650 MILLIGRAM(S): at 12:30

## 2018-02-16 NOTE — PROGRESS NOTE ADULT - SUBJECTIVE AND OBJECTIVE BOX
Patient is a 57y old  Female who presents with a chief complaint of     LABS  02-16    140  |  107  |  5<L>  ----------------------------<  111<H>  3.7   |  27  |  0.6<L>    Ca    8.0<L>      16 Feb 2018 04:36                            10.7   9.71  )-----------( 278      ( 15 Feb 2018 11:17 )             32.6      Dosing Weight  Height (cm): 165.1 (09 Feb 2018 14:46)  Weight (kg): 60.2 (10 Feb 2018 02:00)  BMI (kg/m2): 22.1 (10 Feb 2018 02:00)  BSA (m2): 1.66 (10 Feb 2018 02:00)  Daily     Daily     T(C): 37.3 (02-16-18 @ 15:37), Max: 37.4 (02-16-18 @ 08:04)  HR: 83 (02-16-18 @ 15:37) (61 - 88)  BP: 99/53 (02-16-18 @ 15:37) (99/53 - 189/72)  RR: 20 (02-16-18 @ 15:37) (16 - 20)  SpO2: 95% (02-16-18 @ 08:05) (95% - 95%)      02-15-18 @ 07:01  -  02-16-18 @ 07:00  --------------------------------------------------------  IN: 2845 mL / OUT: 1200 mL / NET: 1645 mL    02-16-18 @ 07:01  -  02-16-18 @ 16:04  --------------------------------------------------------  IN: 0 mL / OUT: 600 mL / NET: -600 mL    PHYSICAL EXAM:  Constitutional: awake alert    ENMT: trach depenedent    Gastrointestinal:+peg tube in  place site c/d/i Patient is a 57y old  Female HPI:  Patient is a 57y old  Female who presents with a chief complaint of hypoxia at NH. Pt had difficulty breathing after eating dinner, brought to ED. Pt previously hospitalized 1month ago for aspiration PNA. Previously couldn't tolerate barium swallow. Pt intubated and placed on pressors. Pt has been unable to tolerate levophed weaning and SBT. Midodrine started today.  s/p Trach and Peg tube feed  remain vented via trachwho presents with a chief complaint of     LABS  02-16    140  |  107  |  5<L>  ----------------------------<  111<H>  3.7   |  27  |  0.6<L>    Ca    8.0<L>      16 Feb 2018 04:36                            10.7   9.71  )-----------( 278      ( 15 Feb 2018 11:17 )             32.6      Dosing Weight  Height (cm): 165.1 (09 Feb 2018 14:46)  Weight (kg): 60.2 (10 Feb 2018 02:00)  BMI (kg/m2): 22.1 (10 Feb 2018 02:00)  BSA (m2): 1.66 (10 Feb 2018 02:00)  Daily     Daily     T(C): 37.3 (02-16-18 @ 15:37), Max: 37.4 (02-16-18 @ 08:04)  HR: 83 (02-16-18 @ 15:37) (61 - 88)  BP: 99/53 (02-16-18 @ 15:37) (99/53 - 189/72)  RR: 20 (02-16-18 @ 15:37) (16 - 20)  SpO2: 95% (02-16-18 @ 08:05) (95% - 95%)      02-15-18 @ 07:01  -  02-16-18 @ 07:00  --------------------------------------------------------  IN: 2845 mL / OUT: 1200 mL / NET: 1645 mL    02-16-18 @ 07:01  -  02-16-18 @ 16:04  --------------------------------------------------------  IN: 0 mL / OUT: 600 mL / NET: -600 mL    PHYSICAL EXAM:  Constitutional: awake alert    ENMT: trach depenedent    Gastrointestinal:+peg tube in  place site c/d/i

## 2018-02-16 NOTE — PROGRESS NOTE ADULT - SUBJECTIVE AND OBJECTIVE BOX
Patient is a 57y old  Female who presents with a chief complaint of   SEPTIC SHOCK  ^SEPTIC SHOCK  No h/o HF  Handoff  Pneumonia, Organism NOS  MEWS Score  Aspiration into airway, sequela  Aspiration into airway, sequela  Dysphagia, unspecified type  Hypotension, unspecified hypotension type  Sepsis, due to unspecified organism  Aspiration pneumonia, unspecified aspiration pneumonia type, unspecified laterality, unspecified part of lung  Acute respiratory failure with hypoxia  Hypothyroidism, unspecified type  Sepsis  Hypothyroid  Pneumonia  Respiratory failure  Tracheostomy  PEG (percutaneous endoscopic gastrostomy)  0  SEPTIC SHOCK [Active]  Aspiration into airway, sequela [Active]  Aspiration into airway, sequela [Active]  Dysphagia, unspecified type [Active]  Hypotension, unspecified hypotension type [Active]  Sepsis, due to unspecified organism [Active]  Aspiration pneumonia, unspecified aspiration pneumonia type, unspecified laterality, unspecified part of lung [Active]  Acute respiratory failure with hypoxia [Active]  Hypothyroidism, unspecified type [Active]  Sepsis [Active]  Hypothyroid [Active]  Pneumonia [Active]  Respiratory failure [Active]  Tracheostomy [Active]  PEG (percutaneous endoscopic gastrostomy) [Active]  0 [Active      Vital Signs Last 24 Hrs  T(C): 37.4 (16 Feb 2018 08:04), Max: 37.4 (16 Feb 2018 08:04)  T(F): 99.4 (16 Feb 2018 08:04), Max: 99.4 (16 Feb 2018 08:04)  HR: 80 (16 Feb 2018 08:04) (61 - 88)  BP: 189/72 (16 Feb 2018 08:04) (108/58 - 189/72)  BP(mean): 103 (16 Feb 2018 08:04) (79 - 103)  RR: 16 (16 Feb 2018 08:04) (16 - 20)  SpO2: 95% (16 Feb 2018 01:36) (94% - 98%)    use of pressors: Y ___  N _x___    use of sedation: Y ___  N x__    Vent dependent: Yx____  N ____    Mode: AC/ CMV (Assist Control/ Continuous Mandatory Ventilation)  RR (machine): 14  TV (machine): 450  FiO2: 40  PEEP: 5  MAP: 12  PIP: 33                                          Weaning time:    Significant exam findings:   MS: unchanged   CHEST: B/L breath sounds   ABDOMEN: soft   HEART:S1/S2 regular  SKIN:  unchanged     No of BMs:       Nutrition:                    02-15-18 @ 07:01  -  02-16-18 @ 07:00  --------------------------------------------------------  IN:    Osmolite: 920 mL  Total IN: 920 mL          LABS:                          10.7   9.71  )-----------( 278      ( 15 Feb 2018 11:17 )             32.6                                                                                                                                                                                           MEDICATIONS  (STANDING):  acetaminophen   Tablet. 650 milliGRAM(s) Oral every 6 hours  chlorhexidine 0.12% Liquid 15 milliLiter(s) Swish and Spit two times a day  chlorhexidine 0.12% Liquid 15 milliLiter(s) Swish and Spit two times a day  haloperidol     Tablet 0.5 milliGRAM(s) Oral daily  heparin  Injectable 5000 Unit(s) SubCutaneous every 8 hours  levothyroxine 75 MICROGram(s) Oral daily  midodrine 10 milliGRAM(s) Oral every 8 hours  nystatin Powder 1 Application(s) Topical every 12 hours  nystatin/triamcinolone Ointment 1 Application(s) Topical every 12 hours  pantoprazole  Injectable 40 milliGRAM(s) IV Push every 24 hours  senna 1 Tablet(s) Oral every 12 hours  sodium chloride 0.9%. 1000 milliLiter(s) (75 mL/Hr) IV Continuous <Continuous>    MEDICATIONS  (PRN):  acetaminophen  Suppository 650 milliGRAM(s) Rectal every 4 hours PRN For Temp greater than 38 C (100.4 F)  LORazepam   Injectable 1 milliGRAM(s) IV Push every 6 hours PRN Anxiety          Case discussed with staff and family at the bedside

## 2018-02-16 NOTE — PROGRESS NOTE ADULT - ASSESSMENT
IMPRESSION:  1-ARF  2-Asp pneumonia   3-septic shock resolved      PLAN:    CNS: sedation as needed     HEENT:  oral care   c/w weaninng trials   Asp precaution   CARDIOVASCULAR: keep Is =OS     GI: GI prophylaxis. peg feeding     RENAL: follow lytes   repeat labs    INFECTIOUS DISEASE:  off ABX    HEMATOLOGICAL:  DVT prophylaxis.    ENDOCRINE:  Follow up FS.  Insulin protocol if needed. IMPRESSION:  1-ARF  2-Asp pneumonia   3-septic shock resolved ,      PLAN:    CNS: sedation as needed     HEENT:  oral care   c/w weaninng trials up to 24 h  Asp precaution   CARDIOVASCULAR: keep Is =OS     GI: GI prophylaxis. peg feeding     RENAL: follow lytes   repeat labs    INFECTIOUS DISEASE:  off ABX    HEMATOLOGICAL:  DVT prophylaxis.    ENDOCRINE:  Follow up FS.  Insulin protocol if needed.

## 2018-02-16 NOTE — PROGRESS NOTE ADULT - PROBLEM SELECTOR PROBLEM 2
Aspiration pneumonia, unspecified aspiration pneumonia type, unspecified laterality, unspecified part of lung
Acute respiratory failure with hypoxia
Acute respiratory failure with hypoxia
Hypothyroidism, unspecified type
Pneumonia
Pneumonia
Acute respiratory failure with hypoxia
Hypothyroidism, unspecified type
Sepsis, due to unspecified organism

## 2018-02-16 NOTE — PROGRESS NOTE ADULT - PROBLEM SELECTOR PROBLEM 1
Hypothyroidism, unspecified type
Hypothyroidism, unspecified type
Respiratory failure
Respiratory failure
Sepsis
Hypothyroidism, unspecified type
Sepsis
Acute respiratory failure with hypoxia
Dysphagia, unspecified type

## 2018-02-16 NOTE — PROGRESS NOTE ADULT - PROBLEM SELECTOR PLAN 1
change tube feed to intermittent   osmolite 1.5 at [id number] feed and 2 beneprotein packets daily
-c/w synthroid
-c/w synthroid
-sepsis resolved, antibiotics d/c but pt remains on pressors  -midodrine started  -titrate as tolerated  -trach/peg
wean fio2 and vent as tolerated
wean fio2 and vent as tolerated
-c/w synthroid
-pt remains on pressors  -titrate as tolerated
-pt remains on pressors  -titrate as tolerated
-pt remains on pressors  -titrate as tolerated  -trach/peg
tracheostomy placed

## 2018-02-17 RX ORDER — IPRATROPIUM/ALBUTEROL SULFATE 18-103MCG
3 AEROSOL WITH ADAPTER (GRAM) INHALATION EVERY 6 HOURS
Qty: 0 | Refills: 0 | Status: DISCONTINUED | OUTPATIENT
Start: 2018-02-17 | End: 2018-02-23

## 2018-02-17 RX ADMIN — Medication 650 MILLIGRAM(S): at 06:43

## 2018-02-17 RX ADMIN — Medication 650 MILLIGRAM(S): at 17:33

## 2018-02-17 RX ADMIN — Medication 650 MILLIGRAM(S): at 23:39

## 2018-02-17 RX ADMIN — NYSTATIN CREAM 1 APPLICATION(S): 100000 CREAM TOPICAL at 17:35

## 2018-02-17 RX ADMIN — Medication 650 MILLIGRAM(S): at 13:00

## 2018-02-17 RX ADMIN — SODIUM CHLORIDE 75 MILLILITER(S): 9 INJECTION INTRAMUSCULAR; INTRAVENOUS; SUBCUTANEOUS at 09:15

## 2018-02-17 RX ADMIN — SENNA PLUS 1 TABLET(S): 8.6 TABLET ORAL at 17:35

## 2018-02-17 RX ADMIN — Medication 1 APPLICATION(S): at 06:44

## 2018-02-17 RX ADMIN — CHLORHEXIDINE GLUCONATE 15 MILLILITER(S): 213 SOLUTION TOPICAL at 17:33

## 2018-02-17 RX ADMIN — Medication 650 MILLIGRAM(S): at 01:00

## 2018-02-17 RX ADMIN — Medication 650 MILLIGRAM(S): at 02:37

## 2018-02-17 RX ADMIN — MIDODRINE HYDROCHLORIDE 10 MILLIGRAM(S): 2.5 TABLET ORAL at 17:34

## 2018-02-17 RX ADMIN — PANTOPRAZOLE SODIUM 40 MILLIGRAM(S): 20 TABLET, DELAYED RELEASE ORAL at 13:00

## 2018-02-17 RX ADMIN — SODIUM CHLORIDE 75 MILLILITER(S): 9 INJECTION INTRAMUSCULAR; INTRAVENOUS; SUBCUTANEOUS at 21:19

## 2018-02-17 RX ADMIN — CHLORHEXIDINE GLUCONATE 15 MILLILITER(S): 213 SOLUTION TOPICAL at 05:15

## 2018-02-17 RX ADMIN — Medication 650 MILLIGRAM(S): at 16:57

## 2018-02-17 RX ADMIN — HALOPERIDOL DECANOATE 0.5 MILLIGRAM(S): 100 INJECTION INTRAMUSCULAR at 13:00

## 2018-02-17 RX ADMIN — SENNA PLUS 1 TABLET(S): 8.6 TABLET ORAL at 05:16

## 2018-02-17 RX ADMIN — MIDODRINE HYDROCHLORIDE 10 MILLIGRAM(S): 2.5 TABLET ORAL at 13:00

## 2018-02-17 RX ADMIN — Medication 650 MILLIGRAM(S): at 05:15

## 2018-02-17 RX ADMIN — MIDODRINE HYDROCHLORIDE 10 MILLIGRAM(S): 2.5 TABLET ORAL at 05:16

## 2018-02-17 RX ADMIN — Medication 75 MICROGRAM(S): at 05:16

## 2018-02-17 RX ADMIN — NYSTATIN CREAM 1 APPLICATION(S): 100000 CREAM TOPICAL at 05:16

## 2018-02-17 RX ADMIN — Medication 1 APPLICATION(S): at 17:34

## 2018-02-17 NOTE — PROGRESS NOTE ADULT - SUBJECTIVE AND OBJECTIVE BOX
Patient is a 57y old  Female who presents with a chief complaint of sob, recurrent aspiration pna, recurrent intubation s/p tracheo    HOSPITAL DAY NO:       Vital Signs Last 24 Hrs  T(C): 37 (17 Feb 2018 00:03), Max: 37.4 (16 Feb 2018 08:04)  T(F): 98.6 (17 Feb 2018 00:03), Max: 99.4 (16 Feb 2018 08:04)  HR: 86 (17 Feb 2018 00:03) (80 - 125)  BP: 115/58 (17 Feb 2018 00:03) (99/53 - 189/72)  BP(mean): 73 (16 Feb 2018 15:37) (73 - 103)  RR: 20 (17 Feb 2018 00:03) (16 - 20)  SpO2: 97% (16 Feb 2018 15:55) (95% - 97%)    use of pressors: Y ___  N ____    use of sedation: Y ___  N ____    Vent dependent: Y____  N ____    Mode: standby  FiO2: 40                                          Weaning time:     Significant exam findings:   MS:  CHEST: B/L breath sounds   ABDOMEN: soft   HEART:S1/S2 regular  SKIN:     No of BMs:       Nutrition:                    02-15-18 @ 07:01  -  02-16-18 @ 07:00  --------------------------------------------------------  IN:    Osmolite: 920 mL  Total IN: 920 mL      02-16-18 @ 07:01  -  02-17-18 @ 06:41  --------------------------------------------------------  IN:    Osmolite: 640 mL  Total IN: 640 mL          LABS:                          10.7   9.71  )-----------( 278      ( 15 Feb 2018 11:17 )             32.6                                               02-16    140  |  107  |  5<L>  ----------------------------<  111<H>  3.7   |  27  |  0.6<L>    Ca    8.0<L>      16 Feb 2018 04:36                                                                                                                                            MEDICATIONS  (STANDING):  acetaminophen   Tablet. 650 milliGRAM(s) Oral every 6 hours  chlorhexidine 0.12% Liquid 15 milliLiter(s) Swish and Spit two times a day  chlorhexidine 0.12% Liquid 15 milliLiter(s) Swish and Spit two times a day  haloperidol     Tablet 0.5 milliGRAM(s) Oral daily  levothyroxine 75 MICROGram(s) Oral daily  midodrine 10 milliGRAM(s) Oral every 8 hours  nystatin Powder 1 Application(s) Topical every 12 hours  nystatin/triamcinolone Ointment 1 Application(s) Topical every 12 hours  pantoprazole  Injectable 40 milliGRAM(s) IV Push every 24 hours  senna 1 Tablet(s) Oral every 12 hours  sodium chloride 0.9%. 1000 milliLiter(s) (75 mL/Hr) IV Continuous <Continuous>    MEDICATIONS  (PRN):  acetaminophen  Suppository 650 milliGRAM(s) Rectal every 4 hours PRN For Temp greater than 38 C (100.4 F)  ALBUTerol/ipratropium for Nebulization 3 milliLiter(s) Nebulizer every 6 hours PRN Wheezing          Case discussed with staff and family at the bedside Patient is a 57y old  Female who presents with a chief complaint of sob, recurrent aspiration pna, recurrent intubation s/p tracheo    HOSPITAL DAY NO: 21      Vital Signs Last 24 Hrs  T(C): 37 (17 Feb 2018 00:03), Max: 37.4 (16 Feb 2018 08:04)  T(F): 98.6 (17 Feb 2018 00:03), Max: 99.4 (16 Feb 2018 08:04)  HR: 86 (17 Feb 2018 00:03) (80 - 125)  BP: 115/58 (17 Feb 2018 00:03) (99/53 - 189/72)  BP(mean): 73 (16 Feb 2018 15:37) (73 - 103)  RR: 20 (17 Feb 2018 00:03) (16 - 20)  SpO2: 97% (16 Feb 2018 15:55) (95% - 97%)    use of pressors: Y ___  N ____    use of sedation: Y ___  N ____    Vent dependent: Y____  N x 3 days off____    Mode: standby  FiO2: 40                                          Weaning time:     Significant exam findings:   MS: unchaned  CHEST: B/L breath sounds   ABDOMEN: soft   HEART:S1/S2 regular  SKIN:     No of BMs:       Nutrition:                    02-15-18 @ 07:01  -  02-16-18 @ 07:00  --------------------------------------------------------  IN:    Osmolite: 920 mL  Total IN: 920 mL      02-16-18 @ 07:01  -  02-17-18 @ 06:41  --------------------------------------------------------  IN:    Osmolite: 640 mL  Total IN: 640 mL          LABS:                          10.7   9.71  )-----------( 278      ( 15 Feb 2018 11:17 )             32.6                                               02-16    140  |  107  |  5<L>  ----------------------------<  111<H>  3.7   |  27  |  0.6<L>    Ca    8.0<L>      16 Feb 2018 04:36                                                                                                                                            MEDICATIONS  (STANDING):  acetaminophen   Tablet. 650 milliGRAM(s) Oral every 6 hours  chlorhexidine 0.12% Liquid 15 milliLiter(s) Swish and Spit two times a day  chlorhexidine 0.12% Liquid 15 milliLiter(s) Swish and Spit two times a day  haloperidol     Tablet 0.5 milliGRAM(s) Oral daily  levothyroxine 75 MICROGram(s) Oral daily  midodrine 10 milliGRAM(s) Oral every 8 hours  nystatin Powder 1 Application(s) Topical every 12 hours  nystatin/triamcinolone Ointment 1 Application(s) Topical every 12 hours  pantoprazole  Injectable 40 milliGRAM(s) IV Push every 24 hours  senna 1 Tablet(s) Oral every 12 hours  sodium chloride 0.9%. 1000 milliLiter(s) (75 mL/Hr) IV Continuous <Continuous>    MEDICATIONS  (PRN):  acetaminophen  Suppository 650 milliGRAM(s) Rectal every 4 hours PRN For Temp greater than 38 C (100.4 F)  ALBUTerol/ipratropium for Nebulization 3 milliLiter(s) Nebulizer every 6 hours PRN Wheezing          Case discussed with staff and family at the bedside

## 2018-02-17 NOTE — PROGRESS NOTE ADULT - ASSESSMENT
IMPRESSION:  1-ARF  2-Asp pneumonia   3-septic shock resolved ,      PLAN:    WEANING AS TOLERATED, ASPIRATION PRECAUTION, D/C PLANNING IMPRESSION:  1-ARF  2-Asp pneumonia   3-septic shock resolved ,      PLAN:    WEANING AS TOLERATED KEEP OFF VENT, ASPIRATION PRECAUTION, D/C PLANNING

## 2018-02-18 LAB
ANION GAP SERPL CALC-SCNC: 4 MMOL/L — LOW (ref 7–14)
BUN SERPL-MCNC: 6 MG/DL — LOW (ref 10–20)
CALCIUM SERPL-MCNC: 8.3 MG/DL — LOW (ref 8.5–10.1)
CHLORIDE SERPL-SCNC: 106 MMOL/L — SIGNIFICANT CHANGE UP (ref 98–110)
CO2 SERPL-SCNC: 31 MMOL/L — SIGNIFICANT CHANGE UP (ref 17–32)
CREAT SERPL-MCNC: 0.7 MG/DL — SIGNIFICANT CHANGE UP (ref 0.7–1.5)
GLUCOSE SERPL-MCNC: 98 MG/DL — SIGNIFICANT CHANGE UP (ref 70–110)
HCT VFR BLD CALC: 34 % — LOW (ref 37–47)
HGB BLD-MCNC: 10.8 G/DL — LOW (ref 14–18)
MCHC RBC-ENTMCNC: 31.8 G/DL — LOW (ref 32–37)
MCHC RBC-ENTMCNC: 32.4 PG — HIGH (ref 27–31)
MCV RBC AUTO: 102.1 FL — HIGH (ref 81–91)
NRBC # BLD: 0 /100 WBCS — SIGNIFICANT CHANGE UP (ref 0–0)
PLATELET # BLD AUTO: 353 K/UL — SIGNIFICANT CHANGE UP (ref 130–400)
POTASSIUM SERPL-MCNC: 4.2 MMOL/L — SIGNIFICANT CHANGE UP (ref 3.5–5)
POTASSIUM SERPL-SCNC: 4.2 MMOL/L — SIGNIFICANT CHANGE UP (ref 3.5–5)
RBC # BLD: 3.33 M/UL — LOW (ref 4.2–5.4)
RBC # FLD: 17.1 % — HIGH (ref 11.5–14.5)
SODIUM SERPL-SCNC: 141 MMOL/L — SIGNIFICANT CHANGE UP (ref 135–146)
WBC # BLD: 9.28 K/UL — SIGNIFICANT CHANGE UP (ref 4.8–10.8)
WBC # FLD AUTO: 9.28 K/UL — SIGNIFICANT CHANGE UP (ref 4.8–10.8)

## 2018-02-18 RX ADMIN — MIDODRINE HYDROCHLORIDE 10 MILLIGRAM(S): 2.5 TABLET ORAL at 05:31

## 2018-02-18 RX ADMIN — Medication 650 MILLIGRAM(S): at 03:49

## 2018-02-18 RX ADMIN — MIDODRINE HYDROCHLORIDE 10 MILLIGRAM(S): 2.5 TABLET ORAL at 18:06

## 2018-02-18 RX ADMIN — Medication 650 MILLIGRAM(S): at 21:11

## 2018-02-18 RX ADMIN — CHLORHEXIDINE GLUCONATE 15 MILLILITER(S): 213 SOLUTION TOPICAL at 05:29

## 2018-02-18 RX ADMIN — Medication 650 MILLIGRAM(S): at 18:16

## 2018-02-18 RX ADMIN — PANTOPRAZOLE SODIUM 40 MILLIGRAM(S): 20 TABLET, DELAYED RELEASE ORAL at 12:58

## 2018-02-18 RX ADMIN — Medication 650 MILLIGRAM(S): at 18:05

## 2018-02-18 RX ADMIN — Medication 75 MICROGRAM(S): at 05:31

## 2018-02-18 RX ADMIN — SENNA PLUS 1 TABLET(S): 8.6 TABLET ORAL at 05:33

## 2018-02-18 RX ADMIN — MIDODRINE HYDROCHLORIDE 10 MILLIGRAM(S): 2.5 TABLET ORAL at 12:57

## 2018-02-18 RX ADMIN — Medication 1 APPLICATION(S): at 05:33

## 2018-02-18 RX ADMIN — Medication 650 MILLIGRAM(S): at 23:45

## 2018-02-18 RX ADMIN — CHLORHEXIDINE GLUCONATE 15 MILLILITER(S): 213 SOLUTION TOPICAL at 18:05

## 2018-02-18 RX ADMIN — Medication 1 APPLICATION(S): at 18:06

## 2018-02-18 RX ADMIN — Medication 650 MILLIGRAM(S): at 05:28

## 2018-02-18 RX ADMIN — Medication 650 MILLIGRAM(S): at 12:57

## 2018-02-18 RX ADMIN — HALOPERIDOL DECANOATE 0.5 MILLIGRAM(S): 100 INJECTION INTRAMUSCULAR at 12:56

## 2018-02-18 RX ADMIN — Medication 650 MILLIGRAM(S): at 13:22

## 2018-02-18 RX ADMIN — Medication 3 MILLILITER(S): at 06:26

## 2018-02-18 RX ADMIN — SENNA PLUS 1 TABLET(S): 8.6 TABLET ORAL at 18:06

## 2018-02-18 NOTE — PROGRESS NOTE ADULT - ASSESSMENT
IMPRESSION:  1-ARF  2-Asp pneumonia   3-septic shock resolved ,      PLAN:    WEANING AS TOLERATED KEEP OFF VENT, ASPIRATION PRECAUTION, D/C PLANNING PEG FEEDING

## 2018-02-18 NOTE — PROGRESS NOTE ADULT - SUBJECTIVE AND OBJECTIVE BOX
no events overnight, 4 days off, peg feeding        HOSPITAL DAY NO:       Vital Signs Last 24 Hrs  T(C): 36.2 (18 Feb 2018 08:14), Max: 37.2 (17 Feb 2018 16:00)  T(F): 97.2 (18 Feb 2018 08:14), Max: 99 (17 Feb 2018 16:00)  HR: 66 (18 Feb 2018 08:19) (66 - 89)  BP: 92/55 (18 Feb 2018 08:14) (92/55 - 131/56)  BP(mean): 70 (18 Feb 2018 08:14) (70 - 88)  RR: 16 (18 Feb 2018 08:19) (16 - 20)  SpO2: 96% (18 Feb 2018 08:19) (96% - 99%)    use of pressors: Y ___  N ____    use of sedation: Y ___  N ____    Vent dependent: Y____  N ____                                            Weaning time:     Significant exam findings:   MS: unchanged  CHEST: B/L breath sounds   ABDOMEN: soft   HEART:S1/S2 regular  SKIN:     No of BMs:       Nutrition:                    02-17-18 @ 07:01  -  02-18-18 @ 07:00  --------------------------------------------------------  IN:    Osmolite: 960 mL  Total IN: 960 mL          LABS:                                                                                                                                                                                            MEDICATIONS  (STANDING):  acetaminophen   Tablet. 650 milliGRAM(s) Oral every 6 hours  chlorhexidine 0.12% Liquid 15 milliLiter(s) Swish and Spit two times a day  chlorhexidine 0.12% Liquid 15 milliLiter(s) Swish and Spit two times a day  haloperidol     Tablet 0.5 milliGRAM(s) Oral daily  levothyroxine 75 MICROGram(s) Oral daily  midodrine 10 milliGRAM(s) Oral every 8 hours  nystatin Powder 1 Application(s) Topical every 12 hours  nystatin/triamcinolone Ointment 1 Application(s) Topical every 12 hours  pantoprazole  Injectable 40 milliGRAM(s) IV Push every 24 hours  senna 1 Tablet(s) Oral every 12 hours  sodium chloride 0.9%. 1000 milliLiter(s) (75 mL/Hr) IV Continuous <Continuous>    MEDICATIONS  (PRN):  acetaminophen  Suppository 650 milliGRAM(s) Rectal every 4 hours PRN For Temp greater than 38 C (100.4 F)  ALBUTerol/ipratropium for Nebulization 3 milliLiter(s) Nebulizer every 6 hours PRN Wheezing          Case discussed with staff and family at the bedside

## 2018-02-19 RX ADMIN — Medication 1 APPLICATION(S): at 17:57

## 2018-02-19 RX ADMIN — MIDODRINE HYDROCHLORIDE 10 MILLIGRAM(S): 2.5 TABLET ORAL at 12:19

## 2018-02-19 RX ADMIN — Medication 650 MILLIGRAM(S): at 17:55

## 2018-02-19 RX ADMIN — Medication 650 MILLIGRAM(S): at 05:50

## 2018-02-19 RX ADMIN — HALOPERIDOL DECANOATE 0.5 MILLIGRAM(S): 100 INJECTION INTRAMUSCULAR at 12:07

## 2018-02-19 RX ADMIN — Medication 650 MILLIGRAM(S): at 12:07

## 2018-02-19 RX ADMIN — Medication 1 APPLICATION(S): at 05:52

## 2018-02-19 RX ADMIN — Medication 650 MILLIGRAM(S): at 23:55

## 2018-02-19 RX ADMIN — Medication 650 MILLIGRAM(S): at 07:03

## 2018-02-19 RX ADMIN — Medication 75 MICROGRAM(S): at 05:51

## 2018-02-19 RX ADMIN — CHLORHEXIDINE GLUCONATE 15 MILLILITER(S): 213 SOLUTION TOPICAL at 05:51

## 2018-02-19 RX ADMIN — Medication 650 MILLIGRAM(S): at 12:30

## 2018-02-19 RX ADMIN — CHLORHEXIDINE GLUCONATE 15 MILLILITER(S): 213 SOLUTION TOPICAL at 17:50

## 2018-02-19 RX ADMIN — MIDODRINE HYDROCHLORIDE 10 MILLIGRAM(S): 2.5 TABLET ORAL at 05:51

## 2018-02-19 RX ADMIN — SENNA PLUS 1 TABLET(S): 8.6 TABLET ORAL at 05:51

## 2018-02-19 RX ADMIN — Medication 650 MILLIGRAM(S): at 18:54

## 2018-02-19 RX ADMIN — PANTOPRAZOLE SODIUM 40 MILLIGRAM(S): 20 TABLET, DELAYED RELEASE ORAL at 12:58

## 2018-02-19 RX ADMIN — MIDODRINE HYDROCHLORIDE 10 MILLIGRAM(S): 2.5 TABLET ORAL at 18:01

## 2018-02-19 NOTE — PROGRESS NOTE ADULT - ASSESSMENT
IMPRESSION:  1-ARF  2-Asp pneumonia   3-septic shock resolved ,      PLAN:    WEANING AS TOLERATED KEEP OFF VENT, ASPIRATION PRECAUTION, D/C PLANNING

## 2018-02-19 NOTE — PROGRESS NOTE ADULT - SUBJECTIVE AND OBJECTIVE BOX
Patient is a 57y old  Female who presents with a chief complaint of   SEPTIC SHOCK  ^SEPTIC SHOCK  No h/o HF  Handoff  Pneumonia, Organism NOS  MEWS Score  Aspiration into airway, sequela  Aspiration into airway, sequela  Dysphagia, unspecified type  Hypotension, unspecified hypotension type  Sepsis, due to unspecified organism  Aspiration pneumonia, unspecified aspiration pneumonia type, unspecified laterality, unspecified part of lung  Acute respiratory failure with hypoxia  Hypothyroidism, unspecified type  Sepsis  Hypothyroid  Pneumonia  Respiratory failure  Tracheostomy  PEG (percutaneous endoscopic gastrostomy)  0  SEPTIC SHOCK [Active]  Aspiration into airway, sequela [Active]  Aspiration into airway, sequela [Active]  Dysphagia, unspecified type [Active]  Hypotension, unspecified hypotension type [Active]  Sepsis, due to unspecified organism [Active]  Aspiration pneumonia, unspecified aspiration pneumonia type, unspecified laterality, unspecified part of lung [Active]  Acute respiratory failure with hypoxia [Active]  Hypothyroidism, unspecified type [Active]  Sepsis [Active]  Hypothyroid [Active]  Pneumonia [Active]  Respiratory failure [Active]  Tracheostomy [Active]  PEG (percutaneous endoscopic gastrostomy) [Active]  0 [Active]          HOSPITAL DAY NO:       Vital Signs Last 24 Hrs  T(C): 37 (19 Feb 2018 00:08), Max: 37.1 (18 Feb 2018 16:03)  T(F): 98.6 (19 Feb 2018 00:08), Max: 98.7 (18 Feb 2018 16:03)  HR: 92 (19 Feb 2018 00:16) (66 - 92)  BP: 148/60 (19 Feb 2018 00:08) (92/55 - 148/60)  BP(mean): 72 (18 Feb 2018 16:03) (70 - 72)  RR: 18 (19 Feb 2018 00:08) (16 - 20)  SpO2: 97% (19 Feb 2018 00:16) (96% - 97%)    use of pressors: Y ___  N _x___    use of sedation: Y ___  N _x___    Vent dependent: Y__x__  N ____                                            Weaning time:     Significant exam findings:   MS: unchanged   CHEST: B/L breath sounds   ABDOMEN: soft   HEART:S1/S2 regular  SKIN: unchanged     No of BMs:       Nutrition:                    02-17-18 @ 07:01  -  02-18-18 @ 07:00  --------------------------------------------------------  IN:    Osmolite: 960 mL  Total IN: 960 mL      02-18-18 @ 07:01  -  02-19-18 @ 06:20  --------------------------------------------------------  IN:    Osmolite: 960 mL  Total IN: 960 mL          LABS:                          10.8   9.28  )-----------( 353      ( 18 Feb 2018 10:40 )             34.0                                               02-18    141  |  106  |  6<L>  ----------------------------<  98  4.2   |  31  |  0.7    Ca    8.3<L>      18 Feb 2018 10:40                                                                                                                                            MEDICATIONS  (STANDING):  acetaminophen   Tablet. 650 milliGRAM(s) Oral every 6 hours  chlorhexidine 0.12% Liquid 15 milliLiter(s) Swish and Spit two times a day  haloperidol     Tablet 0.5 milliGRAM(s) Oral daily  levothyroxine 75 MICROGram(s) Oral daily  midodrine 10 milliGRAM(s) Oral every 8 hours  nystatin/triamcinolone Ointment 1 Application(s) Topical every 12 hours  pantoprazole  Injectable 40 milliGRAM(s) IV Push every 24 hours  senna 1 Tablet(s) Oral every 12 hours    MEDICATIONS  (PRN):  acetaminophen  Suppository 650 milliGRAM(s) Rectal every 4 hours PRN For Temp greater than 38 C (100.4 F)  ALBUTerol/ipratropium for Nebulization 3 milliLiter(s) Nebulizer every 6 hours PRN Wheezing          Case discussed with staff and family at the bedside Patient is a 57y old  Female who presents with a chief complaint of   SEPTIC SHOCK  ^SEPTIC SHOCK  No h/o HF  Handoff  Pneumonia, Organism NOS  MEWS Score  Aspiration into airway, sequela  Aspiration into airway, sequela  Dysphagia, unspecified type  Hypotension, unspecified hypotension type  Sepsis, due to unspecified organism  Aspiration pneumonia, unspecified aspiration pneumonia type, unspecified laterality, unspecified part of lung  Acute respiratory failure with hypoxia  Hypothyroidism, unspecified type  Sepsis  Hypothyroid  Pneumonia  Respiratory failure  Tracheostomy  PEG (percutaneous endoscopic gastrostomy)  0  SEPTIC SHOCK [Active]  Aspiration into airway, sequela [Active]  Aspiration into airway, sequela [Active]  Dysphagia, unspecified type [Active]  Hypotension, unspecified hypotension type [Active]  Sepsis, due to unspecified organism [Active]  Aspiration pneumonia, unspecified aspiration pneumonia type, unspecified laterality, unspecified part of lung [Active]  Acute respiratory failure with hypoxia [Active]  Hypothyroidism, unspecified type [Active]  Sepsis [Active]  Hypothyroid [Active]  Pneumonia [Active]  Respiratory failure [Active]  Tracheostomy [Active]  PEG (percutaneous endoscopic gastrostomy) [Active]  0 [Active]          HOSPITAL DAY NO: 23/6      Vital Signs Last 24 Hrs  T(C): 37 (19 Feb 2018 00:08), Max: 37.1 (18 Feb 2018 16:03)  T(F): 98.6 (19 Feb 2018 00:08), Max: 98.7 (18 Feb 2018 16:03)  HR: 92 (19 Feb 2018 00:16) (66 - 92)  BP: 148/60 (19 Feb 2018 00:08) (92/55 - 148/60)  BP(mean): 72 (18 Feb 2018 16:03) (70 - 72)  RR: 18 (19 Feb 2018 00:08) (16 - 20)  SpO2: 97% (19 Feb 2018 00:16) (96% - 97%)    use of pressors: Y ___  N _x___    use of sedation: Y ___  N _x___    Vent dependent: Y__  N _x___                                            Weaning time:     Significant exam findings:   MS: unchanged   CHEST: B/L breath sounds   ABDOMEN: soft   HEART:S1/S2 regular  SKIN: unchanged     No of BMs: 5      Nutrition: peg                   02-17-18 @ 07:01  -  02-18-18 @ 07:00  --------------------------------------------------------  IN:    Osmolite: 960 mL  Total IN: 960 mL      02-18-18 @ 07:01  -  02-19-18 @ 06:20  --------------------------------------------------------  IN:    Osmolite: 960 mL  Total IN: 960 mL          LABS:                          10.8   9.28  )-----------( 353      ( 18 Feb 2018 10:40 )             34.0                                               02-18    141  |  106  |  6<L>  ----------------------------<  98  4.2   |  31  |  0.7    Ca    8.3<L>      18 Feb 2018 10:40                                                                                                                                            MEDICATIONS  (STANDING):  acetaminophen   Tablet. 650 milliGRAM(s) Oral every 6 hours  chlorhexidine 0.12% Liquid 15 milliLiter(s) Swish and Spit two times a day  haloperidol     Tablet 0.5 milliGRAM(s) Oral daily  levothyroxine 75 MICROGram(s) Oral daily  midodrine 10 milliGRAM(s) Oral every 8 hours  nystatin/triamcinolone Ointment 1 Application(s) Topical every 12 hours  pantoprazole  Injectable 40 milliGRAM(s) IV Push every 24 hours  senna 1 Tablet(s) Oral every 12 hours    MEDICATIONS  (PRN):  acetaminophen  Suppository 650 milliGRAM(s) Rectal every 4 hours PRN For Temp greater than 38 C (100.4 F)  ALBUTerol/ipratropium for Nebulization 3 milliLiter(s) Nebulizer every 6 hours PRN Wheezing          Case discussed with staff and family at the bedside

## 2018-02-19 NOTE — CONSULT NOTE ADULT - ASSESSMENT
Full-thickness wound right neck and chest post tracheostomy    REC: soap and water qd, xeroform gauze and  dry gauze dressing change bid    no surgery at this time

## 2018-02-19 NOTE — CONSULT NOTE ADULT - SUBJECTIVE AND OBJECTIVE BOX
bedridden women post tracheostomy--> wound     PE: 5x5 cm full thickness granulating wound right neck and chest communicating with tracheostomy --> no infection

## 2018-02-20 RX ADMIN — Medication 650 MILLIGRAM(S): at 05:28

## 2018-02-20 RX ADMIN — HALOPERIDOL DECANOATE 0.5 MILLIGRAM(S): 100 INJECTION INTRAMUSCULAR at 12:22

## 2018-02-20 RX ADMIN — Medication 75 MICROGRAM(S): at 05:28

## 2018-02-20 RX ADMIN — MIDODRINE HYDROCHLORIDE 10 MILLIGRAM(S): 2.5 TABLET ORAL at 05:29

## 2018-02-20 RX ADMIN — PANTOPRAZOLE SODIUM 40 MILLIGRAM(S): 20 TABLET, DELAYED RELEASE ORAL at 14:14

## 2018-02-20 RX ADMIN — Medication 1 APPLICATION(S): at 18:48

## 2018-02-20 RX ADMIN — SENNA PLUS 1 TABLET(S): 8.6 TABLET ORAL at 18:48

## 2018-02-20 RX ADMIN — CHLORHEXIDINE GLUCONATE 15 MILLILITER(S): 213 SOLUTION TOPICAL at 18:47

## 2018-02-20 RX ADMIN — Medication 650 MILLIGRAM(S): at 05:32

## 2018-02-20 RX ADMIN — Medication 1 APPLICATION(S): at 05:32

## 2018-02-20 RX ADMIN — MIDODRINE HYDROCHLORIDE 10 MILLIGRAM(S): 2.5 TABLET ORAL at 18:48

## 2018-02-20 RX ADMIN — CHLORHEXIDINE GLUCONATE 15 MILLILITER(S): 213 SOLUTION TOPICAL at 05:26

## 2018-02-20 NOTE — PROGRESS NOTE ADULT - SUBJECTIVE AND OBJECTIVE BOX
Patient is a 57y old  Female who presents with a chief complaint of   SEPTIC SHOCK  ^SEPTIC SHOCK  No h/o HF  Handoff  Pneumonia, Organism NOS  MEWS Score  Aspiration into airway, sequela  Aspiration into airway, sequela  Dysphagia, unspecified type  Hypotension, unspecified hypotension type  Sepsis, due to unspecified organism  Aspiration pneumonia, unspecified aspiration pneumonia type, unspecified laterality, unspecified part of lung  Acute respiratory failure with hypoxia  Hypothyroidism, unspecified type  Sepsis  Hypothyroid  Pneumonia  Respiratory failure  Tracheostomy  PEG (percutaneous endoscopic gastrostomy)  0  SEPTIC SHOCK [Active]  Aspiration into airway, sequela [Active]  Aspiration into airway, sequela [Active]  Dysphagia, unspecified type [Active]  Hypotension, unspecified hypotension type [Active]  Sepsis, due to unspecified organism [Active]  Aspiration pneumonia, unspecified aspiration pneumonia type, unspecified laterality, unspecified part of lung [Active]  Acute respiratory failure with hypoxia [Active]  Hypothyroidism, unspecified type [Active]  Sepsis [Active]  Hypothyroid [Active]  Pneumonia [Active]  Respiratory failure [Active]  Tracheostomy [Active]  PEG (percutaneous endoscopic gastrostomy) [Active]  0 [Active]          HOSPITAL DAY NO:       Vital Signs Last 24 Hrs  T(C): 37.2 (19 Feb 2018 23:44), Max: 37.2 (19 Feb 2018 15:30)  T(F): 98.9 (19 Feb 2018 23:44), Max: 99 (19 Feb 2018 15:30)  HR: 88 (19 Feb 2018 23:44) (78 - 88)  BP: 104/60 (19 Feb 2018 23:44) (104/60 - 111/58)  BP(mean): 80 (19 Feb 2018 15:30) (80 - 83)  RR: 20 (19 Feb 2018 23:44) (18 - 20)  SpO2: 98% (19 Feb 2018 17:47) (98% - 98%)    use of pressors: Y ___  N __x__    use of sedation: Y ___  N __x__    Vent dependent: Y____  N __x__    Mode: standby                                          Weaning time: off 4 days     Significant exam findings:   MS: unchanged   CHEST: B/L breath sounds   ABDOMEN: soft   HEART:S1/S2 regular  SKIN: unchanged     No of BMs:       Nutrition:                    02-19-18 @ 07:01  -  02-20-18 @ 07:00  --------------------------------------------------------  IN:    Osmolite: 960 mL  Total IN: 960 mL          LABS:                          10.8   9.28  )-----------( 353      ( 18 Feb 2018 10:40 )             34.0                                               02-18    141  |  106  |  6<L>  ----------------------------<  98  4.2   |  31  |  0.7    Ca    8.3<L>      18 Feb 2018 10:40                                                                                                                                            MEDICATIONS  (STANDING):  acetaminophen   Tablet. 650 milliGRAM(s) Oral every 6 hours  chlorhexidine 0.12% Liquid 15 milliLiter(s) Swish and Spit two times a day  haloperidol     Tablet 0.5 milliGRAM(s) Oral daily  levothyroxine 75 MICROGram(s) Oral daily  midodrine 10 milliGRAM(s) Oral every 8 hours  nystatin/triamcinolone Ointment 1 Application(s) Topical every 12 hours  pantoprazole  Injectable 40 milliGRAM(s) IV Push every 24 hours  senna 1 Tablet(s) Oral every 12 hours    MEDICATIONS  (PRN):  acetaminophen  Suppository 650 milliGRAM(s) Rectal every 4 hours PRN For Temp greater than 38 C (100.4 F)  ALBUTerol/ipratropium for Nebulization 3 milliLiter(s) Nebulizer every 6 hours PRN Wheezing          Case discussed with staff and family at the bedside

## 2018-02-20 NOTE — PROGRESS NOTE ADULT - ATTENDING COMMENTS
sign off to hospitalist service
discussed with PA and CCU team. Pt had been receiving Osmolite 1.5 - 240 ml x 4 feedings per day. 2Beneprotein packets/d recommended.  Concerned with pain at G-tube site. This seems to have improved today, so will trial restarting feeds.  Suggest 120 ml for the first feed. If tolerated increase to 240 ml Osmolite 1.5 x 4 feeds per day.  Hope to transfer to Vent Unit once off pressor support. At that point would change feeds to Osmolite 1.5 - 360 ml x three "meals"/d.  Cont 2 Beneprotein daily until alb wnl.  f/u in phos level  If tube feeds tolerated, can add 20 mEq KCL enterally with each feed x 24-36 h until replete.
Patient seen and evaluated independently medical resident note reviewed, I agree with plan and management, except as I have noted.
Patient seen and evaluated independently medical resident note reviewed, I agree with plan and management.
pt seen and evaluated independently, medical resident note reviewed, I agree with plan and management.
Patient seen and evaluated independently medical resident note reviewed, I agree with plan and management.

## 2018-02-21 RX ADMIN — Medication 1 APPLICATION(S): at 06:04

## 2018-02-21 RX ADMIN — CHLORHEXIDINE GLUCONATE 15 MILLILITER(S): 213 SOLUTION TOPICAL at 17:05

## 2018-02-21 RX ADMIN — HALOPERIDOL DECANOATE 0.5 MILLIGRAM(S): 100 INJECTION INTRAMUSCULAR at 12:32

## 2018-02-21 RX ADMIN — Medication 75 MICROGRAM(S): at 06:04

## 2018-02-21 RX ADMIN — SENNA PLUS 1 TABLET(S): 8.6 TABLET ORAL at 06:04

## 2018-02-21 RX ADMIN — SENNA PLUS 1 TABLET(S): 8.6 TABLET ORAL at 17:05

## 2018-02-21 RX ADMIN — MIDODRINE HYDROCHLORIDE 10 MILLIGRAM(S): 2.5 TABLET ORAL at 17:05

## 2018-02-21 RX ADMIN — Medication 1 APPLICATION(S): at 17:05

## 2018-02-21 RX ADMIN — CHLORHEXIDINE GLUCONATE 15 MILLILITER(S): 213 SOLUTION TOPICAL at 06:04

## 2018-02-21 RX ADMIN — MIDODRINE HYDROCHLORIDE 10 MILLIGRAM(S): 2.5 TABLET ORAL at 06:04

## 2018-02-21 RX ADMIN — PANTOPRAZOLE SODIUM 40 MILLIGRAM(S): 20 TABLET, DELAYED RELEASE ORAL at 12:38

## 2018-02-21 RX ADMIN — MIDODRINE HYDROCHLORIDE 10 MILLIGRAM(S): 2.5 TABLET ORAL at 12:32

## 2018-02-21 NOTE — PROGRESS NOTE ADULT - ASSESSMENT
ASSESSMENT/PLAN    IMPRESSION:  1-ARF  2-Asp pneumonia   3-septic shock resolved   4. Dysphagia       SUGGEST:  1. see note 2/16       change g-tube feed to intermittent/ meal- pattern feeding, as follows:   osmolite 1.5 at 240ml x4 feeds/d (7am, noon, 5pm, 10 pm) and 2 beneprotein packets daily.  2. f/u bmp, in phos, Mg levels.   3. weigh pt every 2-3 days  4. after above feeding tolerated for 24-48 hours, would increase to goal of Osmolite 1.5  360 ml x THREE meals/d.      Can d/c beneprotein at time of discharge.

## 2018-02-21 NOTE — PROGRESS NOTE ADULT - SUBJECTIVE AND OBJECTIVE BOX
pt seen and evaluated   remain vented via trach on peg tube feed, feed tolerated per RN  pt more awake, abd soft and NT, GT in place    Diet, NPO with Tube Feed:   Osmolite 1.5 Rj    Tube Feeding Modality: Gastrostomy  Total Volume for 24 Hours (mL): 1080  Continuous  Starting Tube Feed Rate mL per Hour: 10     Every 4 hours  Until Goal Tube Feed Rate (mL per Hour): 45  Tube Feed Duration (in Hours): 24  Tube Feed Start Time: 11:00  Beneprotein ([facility] Only)     Qty per Day:  2 (02-13-18 @ 12:41)  Drug Dosing Weight  Height (cm): 165.1 (09 Feb 2018 14:46)  Weight (kg): 61.8 (21 Feb 2018 07:10)  BMI (kg/m2): 22.7 (21 Feb 2018 07:10)  BSA (m2): 1.68 (21 Feb 2018 07:10)    T(C): 37.1 (02-21-18 @ 15:28), Max: 37.3 (02-20-18 @ 23:59)  HR: 72 (02-21-18 @ 15:28) (62 - 80)  BP: 126/62 (02-21-18 @ 15:28) (100/54 - 126/62)  RR: 20 (02-21-18 @ 15:28) (20 - 20)  SpO2: 81% (02-21-18 @ 15:28) (81% - 100%)      02-20-18 @ 07:01  -  02-21-18 @ 07:00  --------------------------------------------------------  IN: 940 mL / OUT: 1000 mL / NET: -60 mL    02-21-18 @ 07:01  -  02-21-18 @ 15:52  --------------------------------------------------------  IN: 280 mL / OUT: 400 mL / NET: -120 mL

## 2018-02-22 LAB
ANION GAP SERPL CALC-SCNC: 6 MMOL/L — LOW (ref 7–14)
BUN SERPL-MCNC: 14 MG/DL — SIGNIFICANT CHANGE UP (ref 10–20)
CALCIUM SERPL-MCNC: 8.5 MG/DL — SIGNIFICANT CHANGE UP (ref 8.5–10.1)
CHLORIDE SERPL-SCNC: 99 MMOL/L — SIGNIFICANT CHANGE UP (ref 98–110)
CO2 SERPL-SCNC: 31 MMOL/L — SIGNIFICANT CHANGE UP (ref 17–32)
CREAT SERPL-MCNC: 0.7 MG/DL — SIGNIFICANT CHANGE UP (ref 0.7–1.5)
GLUCOSE SERPL-MCNC: 138 MG/DL — HIGH (ref 70–110)
HCT VFR BLD CALC: 35.2 % — LOW (ref 37–47)
HGB BLD-MCNC: 11.4 G/DL — LOW (ref 14–18)
MCHC RBC-ENTMCNC: 32.4 G/DL — SIGNIFICANT CHANGE UP (ref 32–37)
MCHC RBC-ENTMCNC: 32.6 PG — HIGH (ref 27–31)
MCV RBC AUTO: 100.6 FL — HIGH (ref 81–91)
NRBC # BLD: 0 /100 WBCS — SIGNIFICANT CHANGE UP (ref 0–0)
PLATELET # BLD AUTO: 424 K/UL — HIGH (ref 130–400)
POTASSIUM SERPL-MCNC: 4.3 MMOL/L — SIGNIFICANT CHANGE UP (ref 3.5–5)
POTASSIUM SERPL-SCNC: 4.3 MMOL/L — SIGNIFICANT CHANGE UP (ref 3.5–5)
RBC # BLD: 3.5 M/UL — LOW (ref 4.2–5.4)
RBC # FLD: 17.1 % — HIGH (ref 11.5–14.5)
SODIUM SERPL-SCNC: 136 MMOL/L — SIGNIFICANT CHANGE UP (ref 135–146)
WBC # BLD: 11.33 K/UL — HIGH (ref 4.8–10.8)
WBC # FLD AUTO: 11.33 K/UL — HIGH (ref 4.8–10.8)

## 2018-02-22 RX ADMIN — MIDODRINE HYDROCHLORIDE 10 MILLIGRAM(S): 2.5 TABLET ORAL at 05:07

## 2018-02-22 RX ADMIN — Medication 1 APPLICATION(S): at 17:44

## 2018-02-22 RX ADMIN — PANTOPRAZOLE SODIUM 40 MILLIGRAM(S): 20 TABLET, DELAYED RELEASE ORAL at 13:30

## 2018-02-22 RX ADMIN — CHLORHEXIDINE GLUCONATE 15 MILLILITER(S): 213 SOLUTION TOPICAL at 05:05

## 2018-02-22 RX ADMIN — SENNA PLUS 1 TABLET(S): 8.6 TABLET ORAL at 17:45

## 2018-02-22 RX ADMIN — Medication 1 APPLICATION(S): at 05:08

## 2018-02-22 RX ADMIN — HALOPERIDOL DECANOATE 0.5 MILLIGRAM(S): 100 INJECTION INTRAMUSCULAR at 12:07

## 2018-02-22 RX ADMIN — Medication 75 MICROGRAM(S): at 05:07

## 2018-02-22 RX ADMIN — CHLORHEXIDINE GLUCONATE 15 MILLILITER(S): 213 SOLUTION TOPICAL at 17:42

## 2018-02-22 RX ADMIN — MIDODRINE HYDROCHLORIDE 10 MILLIGRAM(S): 2.5 TABLET ORAL at 17:44

## 2018-02-22 RX ADMIN — MIDODRINE HYDROCHLORIDE 10 MILLIGRAM(S): 2.5 TABLET ORAL at 12:08

## 2018-02-22 NOTE — PROGRESS NOTE ADULT - ASSESSMENT
56 yo F presented for septic shock secondary to aspiration pneumonia complicated by acute respiratory failure s/p trach placement.     1. ARF s/p trach   - local trach care. 56 yo F presented for septic shock secondary to aspiration pneumonia complicated by acute respiratory failure s/p trach placement.     1. ARF s/p trach   - local trach care.     2. Hypothyroidism   - c/w levothyroxine    GI ppx  DVT ppx - sequentials

## 2018-02-22 NOTE — PROGRESS NOTE ADULT - SUBJECTIVE AND OBJECTIVE BOX
SUBJECTIVE:    Patient is a 57y old Female who presents with a chief complaint of   Currently admitted to medicine with the primary diagnosis of  septic shock secondary to aspiration pneumonia.   Today is hospital day 26d. This morning she is resting comfortably in bed and reports no new issues or overnight events.     PAST MEDICAL & SURGICAL HISTORY    SOCIAL HISTORY:  Negative for smoking/alcohol/drug use.     ALLERGIES:  No Known Allergies    MEDICATIONS:  STANDING MEDICATIONS  chlorhexidine 0.12% Liquid 15 milliLiter(s) Swish and Spit two times a day  haloperidol     Tablet 0.5 milliGRAM(s) Oral daily  levothyroxine 75 MICROGram(s) Oral daily  midodrine 10 milliGRAM(s) Oral every 8 hours  nystatin/triamcinolone Ointment 1 Application(s) Topical every 12 hours  pantoprazole  Injectable 40 milliGRAM(s) IV Push every 24 hours  senna 1 Tablet(s) Oral every 12 hours    PRN MEDICATIONS  acetaminophen  Suppository 650 milliGRAM(s) Rectal every 4 hours PRN  ALBUTerol/ipratropium for Nebulization 3 milliLiter(s) Nebulizer every 6 hours PRN    VITALS:   T(F): 99.3  HR: 86  BP: 99/46  RR: 18  SpO2: 97%    LABS:                        11.4   11.33 )-----------( 424      ( 22 Feb 2018 07:41 )             35.2     02-22    136  |  99  |  14  ----------------------------<  138<H>  4.3   |  31  |  0.7    Ca    8.5      22 Feb 2018 07:41        RADIOLOGY:    < from: Xray Chest 1 View- PORTABLE-Routine (02.13.18 @ 06:00) >  Stable faint bilateral opacities. No definite pneumothorax.    < end of copied text >      PHYSICAL EXAM:  GEN: No acute distress  LUNGS: Trach in place. Clear to auscultation bilaterally.   HEART: S1/S2 present. RRR.   ABD: Soft, non-tender, non-distended. Bowel sounds present  EXT: NC/NC/NE/2+PP/BARRY  NEURO: Awake and alert. SUBJECTIVE:    Patient is a 57y old Female who presents with a chief complaint of   Currently admitted to medicine with the primary diagnosis of  septic shock secondary to aspiration pneumonia.   Today is hospital day 26d. This morning she is resting comfortably in bed and reports no new issues or overnight events.     PAST MEDICAL & SURGICAL HISTORY    SOCIAL HISTORY:  Negative for smoking/alcohol/drug use.     ALLERGIES:  No Known Allergies    MEDICATIONS:  STANDING MEDICATIONS  chlorhexidine 0.12% Liquid 15 milliLiter(s) Swish and Spit two times a day  haloperidol     Tablet 0.5 milliGRAM(s) Oral daily  levothyroxine 75 MICROGram(s) Oral daily  midodrine 10 milliGRAM(s) Oral every 8 hours  nystatin/triamcinolone Ointment 1 Application(s) Topical every 12 hours  pantoprazole  Injectable 40 milliGRAM(s) IV Push every 24 hours  senna 1 Tablet(s) Oral every 12 hours    PRN MEDICATIONS  acetaminophen  Suppository 650 milliGRAM(s) Rectal every 4 hours PRN  ALBUTerol/ipratropium for Nebulization 3 milliLiter(s) Nebulizer every 6 hours PRN    VITALS:   T(F): 99.3  HR: 86  BP: 99/46  RR: 18  SpO2: 97%    LABS:                        11.4   11.33 )-----------( 424      ( 22 Feb 2018 07:41 )             35.2     02-22    136  |  99  |  14  ----------------------------<  138<H>  4.3   |  31  |  0.7    Ca    8.5      22 Feb 2018 07:41        RADIOLOGY:    < from: Xray Chest 1 View- PORTABLE-Routine (02.13.18 @ 06:00) >  Stable faint bilateral opacities. No definite pneumothorax.    < end of copied text >      PHYSICAL EXAM:  GEN: No acute distress  LUNGS: Trach in place. Clear to auscultation bilaterally.   HEART: S1/S2 present. RRR.   ABD: Soft, non-tender, non-distended. Bowel sounds present  EXT: NC/NC/NE/2+PP/BARRY  NEURO: No focal deficits.

## 2018-02-23 ENCOUNTER — TRANSCRIPTION ENCOUNTER (OUTPATIENT)
Age: 58
End: 2018-02-23

## 2018-02-23 VITALS
HEART RATE: 89 BPM | SYSTOLIC BLOOD PRESSURE: 94 MMHG | TEMPERATURE: 99 F | RESPIRATION RATE: 187 BRPM | DIASTOLIC BLOOD PRESSURE: 46 MMHG

## 2018-02-23 LAB
ANION GAP SERPL CALC-SCNC: 7 MMOL/L — SIGNIFICANT CHANGE UP (ref 7–14)
BUN SERPL-MCNC: 14 MG/DL — SIGNIFICANT CHANGE UP (ref 10–20)
CALCIUM SERPL-MCNC: 8.5 MG/DL — SIGNIFICANT CHANGE UP (ref 8.5–10.1)
CHLORIDE SERPL-SCNC: 101 MMOL/L — SIGNIFICANT CHANGE UP (ref 98–110)
CO2 SERPL-SCNC: 31 MMOL/L — SIGNIFICANT CHANGE UP (ref 17–32)
CREAT SERPL-MCNC: 0.8 MG/DL — SIGNIFICANT CHANGE UP (ref 0.7–1.5)
GLUCOSE SERPL-MCNC: 112 MG/DL — HIGH (ref 70–110)
HCT VFR BLD CALC: 34.1 % — LOW (ref 37–47)
HGB BLD-MCNC: 11 G/DL — LOW (ref 14–18)
MCHC RBC-ENTMCNC: 32.3 G/DL — SIGNIFICANT CHANGE UP (ref 32–37)
MCHC RBC-ENTMCNC: 32.5 PG — HIGH (ref 27–31)
MCV RBC AUTO: 100.9 FL — HIGH (ref 81–91)
NRBC # BLD: 0 /100 WBCS — SIGNIFICANT CHANGE UP (ref 0–0)
PLATELET # BLD AUTO: 428 K/UL — HIGH (ref 130–400)
POTASSIUM SERPL-MCNC: 4.5 MMOL/L — SIGNIFICANT CHANGE UP (ref 3.5–5)
POTASSIUM SERPL-SCNC: 4.5 MMOL/L — SIGNIFICANT CHANGE UP (ref 3.5–5)
RBC # BLD: 3.38 M/UL — LOW (ref 4.2–5.4)
RBC # FLD: 17.1 % — HIGH (ref 11.5–14.5)
SODIUM SERPL-SCNC: 139 MMOL/L — SIGNIFICANT CHANGE UP (ref 135–146)
WBC # BLD: 9.42 K/UL — SIGNIFICANT CHANGE UP (ref 4.8–10.8)
WBC # FLD AUTO: 9.42 K/UL — SIGNIFICANT CHANGE UP (ref 4.8–10.8)

## 2018-02-23 RX ORDER — METFORMIN HYDROCHLORIDE 850 MG/1
500 TABLET ORAL EVERY 8 HOURS
Qty: 0 | Refills: 0 | Status: DISCONTINUED | OUTPATIENT
Start: 2018-02-23 | End: 2018-02-23

## 2018-02-23 RX ORDER — MIDODRINE HYDROCHLORIDE 2.5 MG/1
1 TABLET ORAL
Qty: 0 | Refills: 0 | COMMUNITY
Start: 2018-02-23

## 2018-02-23 RX ORDER — NYSTATIN/TRIAMCINOLONE ACET
1 OINTMENT (GRAM) TOPICAL
Qty: 0 | Refills: 0 | COMMUNITY
Start: 2018-02-23

## 2018-02-23 RX ORDER — ACETAMINOPHEN 500 MG
1 TABLET ORAL
Qty: 0 | Refills: 0 | COMMUNITY
Start: 2018-02-23

## 2018-02-23 RX ORDER — SENNA PLUS 8.6 MG/1
1 TABLET ORAL
Qty: 0 | Refills: 0 | COMMUNITY
Start: 2018-02-23

## 2018-02-23 RX ORDER — LEVOTHYROXINE SODIUM 125 MCG
1 TABLET ORAL
Qty: 0 | Refills: 0 | COMMUNITY
Start: 2018-02-23

## 2018-02-23 RX ORDER — PANTOPRAZOLE SODIUM 20 MG/1
1 TABLET, DELAYED RELEASE ORAL
Qty: 5 | Refills: 0 | OUTPATIENT
Start: 2018-02-23 | End: 2018-02-27

## 2018-02-23 RX ORDER — PANTOPRAZOLE SODIUM 20 MG/1
1 TABLET, DELAYED RELEASE ORAL
Qty: 1 | Refills: 0 | OUTPATIENT
Start: 2018-02-23 | End: 2018-02-27

## 2018-02-23 RX ORDER — PANTOPRAZOLE SODIUM 20 MG/1
40 TABLET, DELAYED RELEASE ORAL
Qty: 0 | Refills: 0 | COMMUNITY
Start: 2018-02-23

## 2018-02-23 RX ORDER — IPRATROPIUM/ALBUTEROL SULFATE 18-103MCG
3 AEROSOL WITH ADAPTER (GRAM) INHALATION
Qty: 0 | Refills: 0 | COMMUNITY
Start: 2018-02-23

## 2018-02-23 RX ORDER — CHLORHEXIDINE GLUCONATE 213 G/1000ML
1 SOLUTION TOPICAL
Qty: 0 | Refills: 0 | COMMUNITY
Start: 2018-02-23

## 2018-02-23 RX ORDER — HALOPERIDOL DECANOATE 100 MG/ML
1 INJECTION INTRAMUSCULAR
Qty: 0 | Refills: 0 | COMMUNITY
Start: 2018-02-23

## 2018-02-23 RX ADMIN — CHLORHEXIDINE GLUCONATE 15 MILLILITER(S): 213 SOLUTION TOPICAL at 05:08

## 2018-02-23 RX ADMIN — Medication 75 MICROGRAM(S): at 05:10

## 2018-02-23 RX ADMIN — MIDODRINE HYDROCHLORIDE 10 MILLIGRAM(S): 2.5 TABLET ORAL at 13:33

## 2018-02-23 RX ADMIN — CHLORHEXIDINE GLUCONATE 15 MILLILITER(S): 213 SOLUTION TOPICAL at 17:33

## 2018-02-23 RX ADMIN — SENNA PLUS 1 TABLET(S): 8.6 TABLET ORAL at 17:33

## 2018-02-23 RX ADMIN — MIDODRINE HYDROCHLORIDE 10 MILLIGRAM(S): 2.5 TABLET ORAL at 17:33

## 2018-02-23 RX ADMIN — HALOPERIDOL DECANOATE 0.5 MILLIGRAM(S): 100 INJECTION INTRAMUSCULAR at 13:33

## 2018-02-23 RX ADMIN — Medication 1 APPLICATION(S): at 05:07

## 2018-02-23 RX ADMIN — MIDODRINE HYDROCHLORIDE 10 MILLIGRAM(S): 2.5 TABLET ORAL at 05:10

## 2018-02-23 RX ADMIN — PANTOPRAZOLE SODIUM 40 MILLIGRAM(S): 20 TABLET, DELAYED RELEASE ORAL at 13:33

## 2018-02-23 RX ADMIN — SENNA PLUS 1 TABLET(S): 8.6 TABLET ORAL at 05:10

## 2018-02-23 RX ADMIN — Medication 1 APPLICATION(S): at 17:33

## 2018-02-23 NOTE — PROGRESS NOTE ADULT - PROVIDER SPECIALTY LIST ADULT
Critical Care
Hospitalist
Internal Medicine
Internal Medicine
MFNITISH
MICU
Nutrition Support
Pulmonology
Surgery
Toxicology
Pulmonology
MICU
Pulmonology
Hospitalist
MICU

## 2018-02-23 NOTE — PROGRESS NOTE ADULT - SUBJECTIVE AND OBJECTIVE BOX
pr seen and evaluated   vent dependent via trach  on Peg tube feed  Diet, NPO with Tube Feed:   Osmolite 1.5 Rj    Tube Feeding Modality: Gastrostomy  Total Volume for 24 Hours (mL): 960  Bolus   Total Volume of Bolus (mL):  240  Bolus Feed Rate (mL per Hour): 480   Bolus Feed Duration (in Hours): 0.5  Tube Feed Frequency: Every 6 hours   Tube Feed Start Time: 17:00  Beneprotein ([facility] Only)     Qty per Day:  2 (02-21-18 @ 16:13)    LABS  02-23    139  |  101  |  14  ----------------------------<  112<H>  4.5   |  31  |  0.8    Ca    8.5      23 Feb 2018 09:37                          11.0   9.42  )-----------( 428      ( 23 Feb 2018 09:37 )             34.1     Drug Dosing Weight  Height (cm): 165.1 (09 Feb 2018 14:46)  Weight (kg): 61.8 (22 Feb 2018 07:05)  BMI (kg/m2): 22.7 (22 Feb 2018 07:05)  BSA (m2): 1.68 (22 Feb 2018 07:05)  Daily     Daily     T(C): 37.2 (02-23-18 @ 08:25), Max: 37.5 (02-23-18 @ 00:07)  HR: 77 (02-23-18 @ 08:25) (70 - 91)  BP: 87/52 (02-23-18 @ 08:25) (87/52 - 108/53)  RR: 18 (02-23-18 @ 08:25) (18 - 20)  SpO2: 97% (02-23-18 @ 08:00) (97% - 97%)      02-22-18 @ 07:01  -  02-23-18 @ 07:00  --------------------------------------------------------  IN: 1360 mL / OUT: 1350 mL / NET: 10 mL      ENMT: + trach  in place  Gastrointestinal:  + peg tube in place , site c/d/i

## 2018-02-23 NOTE — DISCHARGE NOTE ADULT - HOSPITAL COURSE
56 yo F presented for septic shock secondary to aspiration pneumonia complicated by acute respiratory failure s/p trach placement.

## 2018-02-23 NOTE — DISCHARGE NOTE ADULT - PATIENT PORTAL LINK FT
You can access the [facility], offered by [facility], by registering with the following website: [email or web address]

## 2018-02-23 NOTE — DISCHARGE NOTE ADULT - PLAN OF CARE
Treatment of symptoms and prevention of future exacerbations. Continue with local trach care. Continue taking albuterol PRN for shortness of breath. Adequate nutrition Continue with tube feeds with Osmolite 1.5 Prevention of hypotension Continue with midodrine as instructed.

## 2018-02-23 NOTE — DISCHARGE NOTE ADULT - CARE PLAN
Principal Discharge DX:	Acute respiratory failure with hypoxia  Goal:	Treatment of symptoms and prevention of future exacerbations.  Assessment and plan of treatment:	Continue with local trach care. Continue taking albuterol PRN for shortness of breath.  Secondary Diagnosis:	Dysphagia, unspecified type  Goal:	Adequate nutrition  Assessment and plan of treatment:	Continue with tube feeds with Osmolite 1.5  Secondary Diagnosis:	Hypotension, unspecified hypotension type  Goal:	Prevention of hypotension  Assessment and plan of treatment:	Continue with midodrine as instructed.

## 2018-02-26 DIAGNOSIS — R06.4 HYPERVENTILATION: ICD-10-CM

## 2018-02-26 DIAGNOSIS — A41.9 SEPSIS, UNSPECIFIED ORGANISM: ICD-10-CM

## 2018-02-26 DIAGNOSIS — R65.21 SEVERE SEPSIS WITH SEPTIC SHOCK: ICD-10-CM

## 2018-02-26 DIAGNOSIS — E46 UNSPECIFIED PROTEIN-CALORIE MALNUTRITION: ICD-10-CM

## 2018-02-26 DIAGNOSIS — E03.9 HYPOTHYROIDISM, UNSPECIFIED: ICD-10-CM

## 2018-02-26 DIAGNOSIS — F79 UNSPECIFIED INTELLECTUAL DISABILITIES: ICD-10-CM

## 2018-02-26 DIAGNOSIS — I95.9 HYPOTENSION, UNSPECIFIED: ICD-10-CM

## 2018-02-26 DIAGNOSIS — Q90.9 DOWN SYNDROME, UNSPECIFIED: ICD-10-CM

## 2018-02-26 DIAGNOSIS — E83.42 HYPOMAGNESEMIA: ICD-10-CM

## 2018-02-26 DIAGNOSIS — J96.01 ACUTE RESPIRATORY FAILURE WITH HYPOXIA: ICD-10-CM

## 2018-02-26 DIAGNOSIS — E83.39 OTHER DISORDERS OF PHOSPHORUS METABOLISM: ICD-10-CM

## 2018-02-26 DIAGNOSIS — I34.0 NONRHEUMATIC MITRAL (VALVE) INSUFFICIENCY: ICD-10-CM

## 2018-02-26 DIAGNOSIS — J69.0 PNEUMONITIS DUE TO INHALATION OF FOOD AND VOMIT: ICD-10-CM

## 2018-02-26 DIAGNOSIS — I10 ESSENTIAL (PRIMARY) HYPERTENSION: ICD-10-CM

## 2018-02-26 DIAGNOSIS — R13.10 DYSPHAGIA, UNSPECIFIED: ICD-10-CM

## 2018-02-26 DIAGNOSIS — E87.6 HYPOKALEMIA: ICD-10-CM

## 2018-02-28 DIAGNOSIS — Z99.11 DEPENDENCE ON RESPIRATOR [VENTILATOR] STATUS: ICD-10-CM

## 2018-03-06 ENCOUNTER — APPOINTMENT (OUTPATIENT)
Age: 58
End: 2018-03-06

## 2018-03-14 ENCOUNTER — APPOINTMENT (OUTPATIENT)
Dept: OBGYN | Facility: HOSPITAL | Age: 58
End: 2018-03-14

## 2018-05-09 ENCOUNTER — APPOINTMENT (OUTPATIENT)
Dept: NUTRITION | Facility: HOSPITAL | Age: 58
End: 2018-05-09

## 2018-06-05 ENCOUNTER — OUTPATIENT (OUTPATIENT)
Dept: OUTPATIENT SERVICES | Facility: HOSPITAL | Age: 58
LOS: 1 days | Discharge: HOME | End: 2018-06-05

## 2018-06-05 DIAGNOSIS — R06.02 SHORTNESS OF BREATH: ICD-10-CM

## 2018-06-05 DIAGNOSIS — A41.9 SEPSIS, UNSPECIFIED ORGANISM: ICD-10-CM

## 2018-06-05 DIAGNOSIS — A04.72 ENTEROCOLITIS DUE TO CLOSTRIDIUM DIFFICILE, NOT SPECIFIED AS RECURRENT: ICD-10-CM

## 2018-06-05 DIAGNOSIS — K21.9 GASTRO-ESOPHAGEAL REFLUX DISEASE WITHOUT ESOPHAGITIS: ICD-10-CM

## 2018-06-05 DIAGNOSIS — K05.11 CHRONIC GINGIVITIS, NON-PLAQUE INDUCED: ICD-10-CM

## 2018-06-07 ENCOUNTER — OUTPATIENT (OUTPATIENT)
Dept: OUTPATIENT SERVICES | Facility: HOSPITAL | Age: 58
LOS: 1 days | Discharge: HOME | End: 2018-06-07

## 2018-06-08 DIAGNOSIS — R50.9 FEVER, UNSPECIFIED: ICD-10-CM

## 2018-06-13 ENCOUNTER — OUTPATIENT (OUTPATIENT)
Dept: OUTPATIENT SERVICES | Facility: HOSPITAL | Age: 58
LOS: 1 days | Discharge: HOME | End: 2018-06-13

## 2018-06-13 DIAGNOSIS — E03.9 HYPOTHYROIDISM, UNSPECIFIED: ICD-10-CM

## 2018-06-20 ENCOUNTER — OUTPATIENT (OUTPATIENT)
Dept: OUTPATIENT SERVICES | Facility: HOSPITAL | Age: 58
LOS: 1 days | Discharge: HOME | End: 2018-06-20

## 2018-06-20 DIAGNOSIS — E03.9 HYPOTHYROIDISM, UNSPECIFIED: ICD-10-CM

## 2018-07-13 ENCOUNTER — OUTPATIENT (OUTPATIENT)
Dept: OUTPATIENT SERVICES | Facility: HOSPITAL | Age: 58
LOS: 1 days | Discharge: HOME | End: 2018-07-13

## 2018-07-13 DIAGNOSIS — A41.9 SEPSIS, UNSPECIFIED ORGANISM: ICD-10-CM

## 2018-07-13 DIAGNOSIS — R19.7 DIARRHEA, UNSPECIFIED: ICD-10-CM

## 2018-07-16 ENCOUNTER — OUTPATIENT (OUTPATIENT)
Dept: OUTPATIENT SERVICES | Facility: HOSPITAL | Age: 58
LOS: 1 days | Discharge: HOME | End: 2018-07-16

## 2018-07-16 DIAGNOSIS — A41.9 SEPSIS, UNSPECIFIED ORGANISM: ICD-10-CM

## 2018-08-09 ENCOUNTER — OUTPATIENT (OUTPATIENT)
Dept: OUTPATIENT SERVICES | Facility: HOSPITAL | Age: 58
LOS: 1 days | Discharge: HOME | End: 2018-08-09

## 2018-08-09 DIAGNOSIS — E03.9 HYPOTHYROIDISM, UNSPECIFIED: ICD-10-CM

## 2018-08-15 ENCOUNTER — OUTPATIENT (OUTPATIENT)
Dept: OUTPATIENT SERVICES | Facility: HOSPITAL | Age: 58
LOS: 1 days | Discharge: HOME | End: 2018-08-15

## 2018-08-15 DIAGNOSIS — E03.9 HYPOTHYROIDISM, UNSPECIFIED: ICD-10-CM

## 2019-03-25 NOTE — DISCHARGE NOTE ADULT - MEDICATION SUMMARY - MEDICATIONS TO TAKE
Called pt. Pt reports that [facility] Pt was informed on Saturday 3/23/19 that her UA showed a UTI. Pt states that she has urethritis so always feels uncomfortable but states that she is having more urgency and frequency. Does PCP want to see pt?Send an antibiotic? Pt prefers [facility] at [address] [place] [address]. Please advise.   I will START or STAY ON the medications listed below when I get home from the hospital:    acetaminophen 650 mg rectal suppository  -- 1 suppository(ies) rectally every 4 hours, As needed, For Temp greater than 38 C (100.4 F)  -- Indication: For Fevers    haloperidol 0.5 mg oral tablet  -- 1 tab(s) by mouth once a day  -- Indication: For Agitation    chlorhexidine 0.12% mucous membrane liquid  -- 1 application mucous membrane 2 times a day  -- Indication: For Oral care    ipratropium-albuterol 0.5 mg-2.5 mg/3 mLinhalation solution  -- 3 milliliter(s) inhaled every 6 hours, As needed, Wheezing  -- Indication: For Shortness of breath    nystatin-triamcinolone 100,000 units/g-0.1% topical ointment  -- 1 application on skin every 12 hours  -- Indication: For Rash    senna oral tablet  -- 1 tab(s) by mouth every 12 hours  -- Indication: For Constipation    midodrine 10 mg oral tablet  -- 1 tab(s) by mouth every 8 hours  -- Indication: For Blood pressure    pantoprazole 40 mg intravenous injection  -- 40 milligram(s) intravenous every 24 hours  -- Indication: For GI ppx    levothyroxine 75 mcg (0.075 mg) oral tablet  -- 1 tab(s) by mouth once a day  -- Indication: For Hypothyroidism, unspecified type I will START or STAY ON the medications listed below when I get home from the hospital:    acetaminophen 650 mg rectal suppository  -- 1 suppository(ies) rectally every 4 hours, As needed, For Temp greater than 38 C (100.4 F)  -- Indication: For Fevers    haloperidol 0.5 mg oral tablet  -- 1 tab(s) by mouth once a day  -- Indication: For Agitation    chlorhexidine 0.12% mucous membrane liquid  -- 1 application mucous membrane 2 times a day  -- Indication: For Oral care    ipratropium-albuterol 0.5 mg-2.5 mg/3 mLinhalation solution  -- 3 milliliter(s) inhaled every 6 hours, As needed, Wheezing  -- Indication: For Shortness of breath    nystatin-triamcinolone 100,000 units/g-0.1% topical ointment  -- 1 application on skin every 12 hours  -- Indication: For Rash    senna oral tablet  -- 1 tab(s) by mouth every 12 hours  -- Indication: For Constipation    midodrine 10 mg oral tablet  -- 1 tab(s) by mouth every 8 hours  -- Indication: For Blood pressure    levothyroxine 75 mcg (0.075 mg) oral tablet  -- 1 tab(s) by mouth once a day  -- Indication: For Hypothyroidism, unspecified type
